# Patient Record
Sex: MALE | Race: WHITE | Employment: OTHER | URBAN - METROPOLITAN AREA
[De-identification: names, ages, dates, MRNs, and addresses within clinical notes are randomized per-mention and may not be internally consistent; named-entity substitution may affect disease eponyms.]

---

## 2019-08-05 ENCOUNTER — DOCUMENTATION ONLY (OUTPATIENT)
Dept: SURGERY | Facility: CLINIC | Age: 59
End: 2019-08-05

## 2019-08-05 NOTE — PROGRESS NOTES
ONCOLOGY INTAKE: Records Information      APPT INFORMATION:  Referring provider:  Dr. Howard Qureshi MD  Referring provider s clinic:  [facility]  Reason for visit/diagnosis:  Colon cancer metastasized to liver  Has patient been notified of appointment date and time?: no    RECORDS INFORMATION:  Were the records received with the referral (via [employer])? yes    Has patient been seen for any external appt for this diagnosis? yes    If yes, where? [facility]    Has patient had any imaging or procedures outside of [facility] for this condition? yes      If Yes, where? [facility]    ADDITIONAL INFORMATION:  Records faxed to Dr. Knowles team for review  Records saved in R drive  Will call patient once scheduling recommendations are advised.

## 2019-08-13 ENCOUNTER — PATIENT OUTREACH (OUTPATIENT)
Dept: ONCOLOGY | Facility: CLINIC | Age: 59
End: 2019-08-13

## 2019-08-13 NOTE — PROGRESS NOTES
Surgical Oncology RN Care Coordination Note:     Called and left a message with RNCC regarding a few questions regarding referral prior to scheduling per request of Dr. Knowles's review.     Need to confirm chemotherapy schedule and regimen. Will also need to obtain MRI prior to visit to further evaluate the liver lesions.     Lena Farfan, RN, BSN  Care Coordinator   [phone]

## 2019-08-15 ENCOUNTER — PATIENT OUTREACH (OUTPATIENT)
Dept: SURGERY | Facility: CLINIC | Age: 59
End: 2019-08-15

## 2019-08-15 DIAGNOSIS — C78.7 METASTATIC COLON CANCER TO LIVER (H): Primary | ICD-10-CM

## 2019-08-15 DIAGNOSIS — C18.9 METASTATIC COLON CANCER TO LIVER (H): Primary | ICD-10-CM

## 2019-08-15 NOTE — PROGRESS NOTES
Surgical Oncology/Hepatobiliary Surgery Referral      Referring provider: Dr. Fields - [facility] [place]     Referred to:  Surgical oncology - Hepatobiliary Surgeon      Referral Received: 8/5/2019     Evaluation for: Metastatic Colon cancer to the liver      Oncology provider (if applicable): Same as referring      Clinical History (per RN review of records provided):      Additional information available in Care everywhere.     - Patient s/p primary resection on 1/22/2019.     - Adjuvant chemotherapy included FOLFOX beginning 2/13/2019, panitumumab added 3/13/2019, changed to CAPEOX with panitumumab q3 weeks on 6/5/2019.     - Currently scheduled for cycle 11 day 1 on 8/30.     Imaging: Available in PACs, outside reads available in care everywhere.      Staging complete (if applicable): Yes.     08/15/2019 9:28 AM - Confirmed with Dr. Knowles patient should proceed with cycle 11 as scheduled and will plan for MRI of the liver to determine if liver mets are resectable. Will plan for patient to meet with Dr. Luong same day as MRI to discuss possible resection.     Referring office's RNCC updated regarding plan and to continue with chemotherapy as scheduled. She is aware we will proceed with getting MRI followed by office visit.    Orders placed and message sent to new patient scheduling to contact patient with scheduling plans.

## 2019-08-20 ENCOUNTER — PATIENT OUTREACH (OUTPATIENT)
Dept: CARE COORDINATION | Facility: CLINIC | Age: 59
End: 2019-08-20

## 2019-08-20 ENCOUNTER — PRE VISIT (OUTPATIENT)
Dept: ONCOLOGY | Facility: CLINIC | Age: 59
End: 2019-08-20

## 2019-08-20 ENCOUNTER — DOCUMENTATION ONLY (OUTPATIENT)
Dept: ONCOLOGY | Facility: CLINIC | Age: 59
End: 2019-08-20

## 2019-08-20 NOTE — PROGRESS NOTES
Per Patient's request,  completed and faxed [employer] request for lodging dates 9/12-9/13. [facility] [employer] will contact Patient for confirmation of reservation.  will continue to provide support as needed.    Soo Yeon Han, [facility] CenterSW  Pager:  [phone]

## 2019-08-20 NOTE — TELEPHONE ENCOUNTER
ONCOLOGY INTAKE: Records Information      APPT INFORMATION:   Referring provider:  Dr. Fields -    Referring provider s clinic:  [facility]    Reason for visit/diagnosis:  Metastatic Colon cancer to the liver      Has the patient been given a timeframe or date/time of appt?:No.      Is this appointment held on the schedule (Hold will be removed once appt is scheduled)?: Yes. Do not    remove hold please.     RECORDS INFORMATION:   Is there any additional testing/work up needed prior to visit?    Yes, MRI, needs to be done at our facility.      Has the patient been notified of diagnosis and appointment referral? Yes by the referring office.      Were the records sent directly to clinic? No.      Has patient been seen for any external appt for this diagnosis (enter clinic/location) that records should be    gathered from? No. We have them.      ADDITIONAL INFORMATION:    MRI scheduled for 9/12/19 at 530pm at the [facility] (check in at 5pm & NPO 6 hrs prior)    Referral to Dwain Pinto requested      Lena Farfan, RN  Lena Farfan, RN; P Onc Adult New Patient Scheduling-      This is a referral for surgical oncology and needs to be have an MRI prior to visit. MRI needs to be done here d/t the type of contrast etc.     Can be done the same day or the evening before and we can send referral to dwain pinto if he would like.

## 2019-08-21 NOTE — TELEPHONE ENCOUNTER
Lena Farfan, RN  Cristino Duff             No we have the imaging we need and patient is getting a MRI prior to visit.     We do not need the pathology for this patient either at this time.     He should be good to go.     Thanks!   KJ

## 2019-09-12 ENCOUNTER — ANCILLARY PROCEDURE (OUTPATIENT)
Dept: MRI IMAGING | Facility: CLINIC | Age: 59
End: 2019-09-12
Attending: SURGERY
Payer: COMMERCIAL

## 2019-09-12 DIAGNOSIS — C78.7 METASTATIC COLON CANCER TO LIVER (H): ICD-10-CM

## 2019-09-12 DIAGNOSIS — C18.9 METASTATIC COLON CANCER TO LIVER (H): ICD-10-CM

## 2019-09-12 NOTE — DISCHARGE INSTRUCTIONS
MRI Contrast Discharge Instructions    The IV contrast you received today will pass out of your body in your  urine. This will happen in the next 24 hours. You will not feel this process.  Your urine will not change color.    Drink at least 4 extra glasses of water or juice today (unless your doctor  has restricted your fluids). This reduces the stress on your kidneys.  You may take your regular medicines.    If you are on dialysis: It is best to have dialysis today.    If you have a reaction: Most reactions happen right away. If you have  any new symptoms after leaving the hospital (such as hives or swelling),  call your hospital at the correct number below. Or call your family doctor.  If you have breathing distress or wheezing, call 911.    Special instructions: ***    I have read and understand the above information.    Signature:______________________________________ Date:___________    Staff:__________________________________________ Date:___________     Time:__________    [facility] Departments:    ___[place]: [phone]  ___[facility]: [phone]  ___[place]: [phone] ___[facility]: [phone]  ___[facility]: [phone]  ___[facility]: [phone]  ___[phone]  ___[facility]: [phone]  ___[phone]

## 2019-09-13 ENCOUNTER — OFFICE VISIT (OUTPATIENT)
Dept: SURGERY | Facility: CLINIC | Age: 59
End: 2019-09-13
Attending: EMERGENCY MEDICINE
Payer: COMMERCIAL

## 2019-09-13 ENCOUNTER — PATIENT OUTREACH (OUTPATIENT)
Dept: SURGERY | Facility: CLINIC | Age: 59
End: 2019-09-13

## 2019-09-13 ENCOUNTER — HOSPITAL ENCOUNTER (INPATIENT)
Facility: CLINIC | Age: 59
Setting detail: SURGERY ADMIT
End: 2019-09-13
Attending: SURGERY | Admitting: SURGERY
Payer: COMMERCIAL

## 2019-09-13 VITALS
DIASTOLIC BLOOD PRESSURE: 89 MMHG | SYSTOLIC BLOOD PRESSURE: 135 MMHG | OXYGEN SATURATION: 98 % | HEART RATE: 73 BPM | WEIGHT: 228.6 LBS | HEIGHT: 73 IN | TEMPERATURE: 98.5 F | BODY MASS INDEX: 30.3 KG/M2 | RESPIRATION RATE: 16 BRPM

## 2019-09-13 DIAGNOSIS — C18.9 METASTATIC COLON CANCER TO LIVER (H): Primary | ICD-10-CM

## 2019-09-13 DIAGNOSIS — C78.7 METASTATIC COLON CANCER TO LIVER (H): Primary | ICD-10-CM

## 2019-09-13 PROCEDURE — G0463 HOSPITAL OUTPT CLINIC VISIT: HCPCS | Mod: ZF

## 2019-09-13 RX ORDER — CAPECITABINE 500 MG/1
TABLET, FILM COATED ORAL DAILY
COMMUNITY
Start: 2019-06-28 | End: 2019-11-08

## 2019-09-13 RX ORDER — LIDOCAINE/PRILOCAINE 2.5 %-2.5%
CREAM (GRAM) TOPICAL PRN
COMMUNITY
Start: 2019-02-07

## 2019-09-13 RX ORDER — SIMVASTATIN 20 MG
20 TABLET ORAL AT BEDTIME
COMMUNITY
Start: 2017-02-17 | End: 2020-02-28

## 2019-09-13 RX ORDER — MINOCYCLINE HYDROCHLORIDE 100 MG/1
100 CAPSULE ORAL DAILY
COMMUNITY
Start: 2019-04-24 | End: 2019-11-08

## 2019-09-13 RX ORDER — METOPROLOL SUCCINATE 100 MG/1
100 TABLET, EXTENDED RELEASE ORAL AT BEDTIME
Refills: 1 | Status: ON HOLD | COMMUNITY
Start: 2019-01-27 | End: 2020-02-11

## 2019-09-13 RX ORDER — DEXAMETHASONE 4 MG/1
8 TABLET ORAL DAILY
COMMUNITY
Start: 2019-03-13 | End: 2019-11-08

## 2019-09-13 ASSESSMENT — ENCOUNTER SYMPTOMS
DIZZINESS: 1
SEIZURES: 0
TASTE DISTURBANCE: 0
TINGLING: 1
WEAKNESS: 0
DYSURIA: 0
NUMBNESS: 0
NAIL CHANGES: 0
JAUNDICE: 0
SMELL DISTURBANCE: 0
BLOATING: 1
SORE THROAT: 0
ABDOMINAL PAIN: 0
HEMATURIA: 0
DIFFICULTY URINATING: 0
RECTAL PAIN: 0
SINUS CONGESTION: 0
VOMITING: 0
PARALYSIS: 0
BLOOD IN STOOL: 0
TROUBLE SWALLOWING: 0
SINUS PAIN: 0
BOWEL INCONTINENCE: 0
SKIN CHANGES: 0
DIARRHEA: 1
HEARTBURN: 0
SPEECH CHANGE: 0
BRUISES/BLEEDS EASILY: 1
FLANK PAIN: 0
CONSTIPATION: 1
HEADACHES: 0
TREMORS: 0
HOARSE VOICE: 0
NECK MASS: 0
NAUSEA: 0
LOSS OF CONSCIOUSNESS: 0
DISTURBANCES IN COORDINATION: 0
POOR WOUND HEALING: 1
SWOLLEN GLANDS: 0
MEMORY LOSS: 0

## 2019-09-13 ASSESSMENT — PAIN SCALES - GENERAL: PAINLEVEL: NO PAIN (0)

## 2019-09-13 ASSESSMENT — MIFFLIN-ST. JEOR: SCORE: 1910.8

## 2019-09-13 NOTE — NURSING NOTE
"Oncology Rooming Note    September 13, 2019 10:41 AM   Ishan Arroyo is a 59 year old male who presents for:    Chief Complaint   Patient presents with     New Patient     NEW PT; COLON CANCER TO LIVER; VITALS TAKEN      Initial Vitals: /89   Pulse 73   Temp 98.5  F (36.9  C) (Oral)   Resp 16   Ht 1.854 m (6' 1\")   Wt 103.7 kg (228 lb 9.6 oz)   SpO2 98%   BMI 30.16 kg/m   Estimated body mass index is 30.16 kg/m  as calculated from the following:    Height as of this encounter: 1.854 m (6' 1\").    Weight as of this encounter: 103.7 kg (228 lb 9.6 oz). Body surface area is 2.31 meters squared.  No Pain (0) Comment: Data Unavailable   No LMP for male patient.  Allergies reviewed: Yes  Medications reviewed: Yes    Medications: Medication refills not needed today.  Pharmacy name entered into EPIC: [facility] - [place], [place] - [address] [place] [address]    Clinical concerns: No new concerns today  Dr Luong was notified.      Emelyn Valverde              "

## 2019-09-13 NOTE — LETTER
9/13/2019       RE: Ishan Arroyo  [address]  [place] [address]     Dear Colleague,    Thank you for referring your patient, Ishan Arroyo, to the [facility]. Please see a copy of my visit note below.    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  Two lesions, 1 in the right hepatic dome, segment 8 abutting the right hepatic vein and encroaching close to the vena cava. The second in deep in segment 7.  Liver cysts stable.  Last dose of CAPEOX-antiEGFR was 8/30/19.  The patient now presents for recommendations on management.  He is healthy and active with good performance status.  Non-smoker.  Minimal alcohol.  No history of hepatitis or cirrhosis.  He takes a beta blocker for HTN and a statin for HLD.     Pertinent Work-Up/Findings: Recent liver MRI shows a lesion in the dome, segment 8 abutting the right hepatic vein and lying close to the vena cava.  A second lesion is deep in segment 7.  No other evidence of metastatic disease by my read, but official radiology read is pending.     Pertinent Exam: Abdomen soft, non-tender, and non-distended.  No jaundice or scleral icterus.  Well healed lower midline laparotomy.     Assessment/Counseling/Plan: 59 M w/ colorectal cancer liver metastases in segments 8 and 7 with good response to 11 cycles of systemic therapy with CAPEOX-antiEGFR.  His disease appears to be resectable with formal right hepatectomy given the abutment of the right hepatic vein.  Given his duration of chemotherapy, his FLR should be 30% for acceptable outcomes following major hepatectomy.  I discussed the risks, benefits, and alternatives of the surgery.  Risk including, but not limited to death, DVT, PE, PNA, cardiac complications, UTI, injury to adjacent organs,  infection, bleeding, bile leak, and hepatic failure were discussed.   Given his chemotherapy, need for major hepatectomy for the best oncologic outcomes, and need for an FLR of 30%, I would like to obtain a CT liver protocol with volumetrics to assess for resection candidacy and potential need for PVE.  If unable to have surgery, then other liver directed therapies such as ablation or embolization may be options.  A tentative surgery date will be posted to the schedule 4-6 weeks following his last dose of chemotherapy.  Consent was signed in the office today.  Of course this date is contingent upon his liver volumetry.  All questions were answered and the patient was in agreement with the above plan.     A total of 45 minutes of face to face time was spent on this case, of which, more than half was spent in counseling and coordination of care.    Again, thank you for allowing me to participate in the care of your patient.      Sincerely,    Lele Luong MD

## 2019-09-13 NOTE — PATIENT INSTRUCTIONS
Surgical Oncology RN Care Coordination Note:     CT scan of the liver.     Surgery on 10/3/2019.     Lena Farfan RN, BSN  Care Coordinator   [phone]

## 2019-09-13 NOTE — PROGRESS NOTES
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  Two lesions, 1 in the right hepatic dome, segment 8 abutting the right hepatic vein and encroaching close to the vena cava. The second in deep in segment 7.  Liver cysts stable.  Last dose of CAPEOX-antiEGFR was 8/30/19.  The patient now presents for recommendations on management.  He is healthy and active with good performance status.  Non-smoker.  Minimal alcohol.  No history of hepatitis or cirrhosis.  He takes a beta blocker for HTN and a statin for HLD.     Pertinent Work-Up/Findings: Recent liver MRI shows a lesion in the dome, segment 8 abutting the right hepatic vein and lying close to the vena cava.  A second lesion is deep in segment 7.  No other evidence of metastatic disease by my read, but official radiology read is pending.     Pertinent Exam: Abdomen soft, non-tender, and non-distended.  No jaundice or scleral icterus.  Well healed lower midline laparotomy.     Assessment/Counseling/Plan: 59 M w/ colorectal cancer liver metastases in segments 8 and 7 with good response to 11 cycles of systemic therapy with CAPEOX-antiEGFR.  His disease appears to be resectable with formal right hepatectomy given the abutment of the right hepatic vein.  Given his duration of chemotherapy, his FLR should be 30% for acceptable outcomes following major hepatectomy.  I discussed the risks, benefits, and alternatives of the surgery.  Risk including, but not limited to death, DVT, PE, PNA, cardiac complications, UTI, injury to adjacent organs, infection, bleeding, bile leak, and hepatic failure were discussed.  Given his chemotherapy, need for major hepatectomy for the best oncologic outcomes, and need for an FLR of 30%, I would like to obtain a CT liver protocol with volumetrics to  assess for resection candidacy and potential need for PVE.  If unable to have surgery, then other liver directed therapies such as ablation or embolization may be options.  A tentative surgery date will be posted to the schedule 4-6 weeks following his last dose of chemotherapy.  Consent was signed in the office today.  Of course this date is contingent upon his liver volumetry.  All questions were answered and the patient was in agreement with the above plan.     A total of 45 minutes of face to face time was spent on this case, of which, more than half was spent in counseling and coordination of care.

## 2019-09-13 NOTE — PROGRESS NOTES
Surgical Oncology RN Care Coordination Note:     Called and spoke with ROSEMARY Reeder at the oncology clinic and updated her that we are planning on proceeding with surgery on 10/3 and would request patient doesn't get any further chemotherapy at this time. Informed her the patient is aware of this as well already.     Lena Farfan RN, BSN  Care Coordinator   [phone]

## 2019-09-20 ENCOUNTER — ANCILLARY PROCEDURE (OUTPATIENT)
Dept: CT IMAGING | Facility: CLINIC | Age: 59
End: 2019-09-20
Attending: SURGERY
Payer: COMMERCIAL

## 2019-09-20 DIAGNOSIS — C78.7 METASTATIC COLON CANCER TO LIVER (H): ICD-10-CM

## 2019-09-20 DIAGNOSIS — C18.9 METASTATIC COLON CANCER TO LIVER (H): ICD-10-CM

## 2019-09-20 RX ORDER — IOPAMIDOL 755 MG/ML
135 INJECTION, SOLUTION INTRAVASCULAR ONCE
Status: COMPLETED | OUTPATIENT
Start: 2019-09-20 | End: 2019-09-20

## 2019-09-20 RX ADMIN — IOPAMIDOL 135 ML: 755 INJECTION, SOLUTION INTRAVASCULAR at 14:20

## 2019-09-24 ENCOUNTER — TELEPHONE (OUTPATIENT)
Dept: SURGERY | Facility: CLINIC | Age: 59
End: 2019-09-24

## 2019-09-24 NOTE — TELEPHONE ENCOUNTER
Patient is scheduled for surgery with Dr. Sterling Luong      Spoke with: Alin    Date of Surgery: 10/03/2019    Location: [address] [address]  3rd floor- 3C    Informed patient they will need an adult  Yes    Pre-op with surgeon (if applicable): Completed     H&P: Scheduled with Goran Lopez    Additional imaging/appointments: Completed    Surgery packet: Sent via [employer].     Additional comments: Patient confirmed details above with this writer.

## 2019-09-25 ENCOUNTER — TELEPHONE (OUTPATIENT)
Dept: CARE COORDINATION | Facility: CLINIC | Age: 59
End: 2019-09-25

## 2019-09-25 NOTE — TELEPHONE ENCOUNTER
Per Patient's request,  completed and faxed [employer] [place] request for lodging dates 10/2/2019 - 10/3/2019. [employer] [place] will contact Patient for confirmation of reservation.  will continue to provide support as needed.    Esperanza Cifuentes [facility]  Outpatient Specialty Clinics  Direct Phone: [phone]  Pager:  [phone]

## 2019-09-27 ENCOUNTER — TELEPHONE (OUTPATIENT)
Dept: SURGERY | Facility: CLINIC | Age: 59
End: 2019-09-27

## 2019-09-27 NOTE — TELEPHONE ENCOUNTER
As I was unable to reach the patient on his home phone, I called his daughter to discuss the next steps of care.  I told her that I still planned for liver resection in the near future; however, his liver volumes returned showing marginal future liver remnant following removal of the right liver.  As such, I have recommended that the patient undergo right portal vein embolization with 4 week follow-up imaging to reassess size prior to proceeding with surgery.  The daughter was in agreement and will relay the message to her father.  We will work on setting the patient up with IR for embolization next week.

## 2019-09-30 DIAGNOSIS — C19 METASTATIC COLORECTAL CANCER: Primary | ICD-10-CM

## 2019-10-03 ENCOUNTER — PATIENT OUTREACH (OUTPATIENT)
Dept: SURGERY | Facility: CLINIC | Age: 59
End: 2019-10-03

## 2019-10-03 DIAGNOSIS — C78.7 METASTATIC COLON CANCER TO LIVER (H): Primary | ICD-10-CM

## 2019-10-03 DIAGNOSIS — C18.9 METASTATIC COLON CANCER TO LIVER (H): Primary | ICD-10-CM

## 2019-10-03 NOTE — PROGRESS NOTES
Surgical Oncology RN Care Coordination Note:     Called and spoke with patients daughter and informed her of plan following embolization. Informed her that we would plan to see the patient back 1 month post procedure with new CT scan and visit with Dr. Luong and our PAC clinic for surgery clearance. She agreed with plan and will discuss with her father.     Note sent to scheduling team to contact patient with appointment info.     Lena Farfan RN, BSN  Care Coordinator   [phone]

## 2019-10-03 NOTE — TELEPHONE ENCOUNTER
RECORDS RECEIVED FROM: PV embolizatoin    DATE RECEIVED: 10.8.19   NOTES STATUS DETAILS   OFFICE NOTE from referring provider Internal 9.13.19 Dr. Lele Luong   OFFICE NOTE from other specialist Internal/CE    OPERATIVE REPORT N/A    MEDICATION LIST Internal/CE    PERTINENT LABS Care Everywhere    CTA (CT ANGIOGRAPHY) N/A    CT In Pacs    MRI In Pacs    ULTRASOUND N/A

## 2019-10-04 RX ORDER — HEPARIN SOD,PORCINE/0.9 % NACL 5K/1000 ML
1000-10000 INTRAVENOUS SOLUTION INTRAVENOUS
Status: CANCELLED | OUTPATIENT
Start: 2019-10-04

## 2019-10-07 ENCOUNTER — TELEPHONE (OUTPATIENT)
Dept: SURGERY | Facility: CLINIC | Age: 59
End: 2019-10-07

## 2019-10-07 DIAGNOSIS — C19 METASTATIC COLORECTAL CANCER: Primary | ICD-10-CM

## 2019-10-07 ASSESSMENT — ENCOUNTER SYMPTOMS
VOMITING: 0
BLOATING: 0
HEMATURIA: 0
TREMORS: 0
NAUSEA: 0
BLOOD IN STOOL: 0
NUMBNESS: 1
DIZZINESS: 0
BOWEL INCONTINENCE: 0
SEIZURES: 0
SKIN CHANGES: 0
POOR WOUND HEALING: 0
RECTAL PAIN: 0
HEADACHES: 0
DIARRHEA: 1
LOSS OF CONSCIOUSNESS: 0
SPEECH CHANGE: 0
PARALYSIS: 0
HEARTBURN: 0
FLANK PAIN: 0
CONSTIPATION: 1
DIFFICULTY URINATING: 0
DISTURBANCES IN COORDINATION: 0
TINGLING: 1
SWOLLEN GLANDS: 0
DYSURIA: 0
JAUNDICE: 0
ABDOMINAL PAIN: 0
BRUISES/BLEEDS EASILY: 1
NAIL CHANGES: 0
MEMORY LOSS: 0
WEAKNESS: 0

## 2019-10-07 NOTE — TELEPHONE ENCOUNTER
----- Message from Lena Farfan RN sent at 10/3/2019  3:04 PM CDT -----  Regarding: pre surgery appointments ; )  Terrence Jain - because we had to delay his surgery a little bit we need to see him back with a few things.     We are going to see him back on 11/8/2019 at 1030 am, he will need to have CT scan prior to office visit.     Also needs to be scheduled to see PAC after the consult with Sterling.     Orders are placed for all and they are aware you will be calling to confirm info.     His daughter Ashley manages his appointment and they use [employer] as well. :)     Thanks! KJ

## 2019-10-07 NOTE — TELEPHONE ENCOUNTER
Patient is scheduled for surgery with Dr. Lele Holly      Spoke with: Alin    Date of Surgery: 11/12/2019    Location: [address] [address]  3rd floor-     Informed patient they will need an adult  YES    Pre-op with surgeon (if applicable): 11/08/2019 with Dr. Luong    H&P: Scheduled with PAC on 11/08/2019    Additional imaging/appointments: CT scan on 11/08/2019    Surgery packet: Mail per patient.     Additional comments: Patient expressed understanding and confirmed details above with this writer.

## 2019-10-08 ENCOUNTER — PRE VISIT (OUTPATIENT)
Dept: VASCULAR SURGERY | Facility: CLINIC | Age: 59
End: 2019-10-08

## 2019-10-08 ENCOUNTER — OFFICE VISIT (OUTPATIENT)
Dept: VASCULAR SURGERY | Facility: CLINIC | Age: 59
End: 2019-10-08
Payer: COMMERCIAL

## 2019-10-08 ENCOUNTER — PRE VISIT (OUTPATIENT)
Dept: SURGERY | Facility: CLINIC | Age: 59
End: 2019-10-08

## 2019-10-08 VITALS — SYSTOLIC BLOOD PRESSURE: 132 MMHG | OXYGEN SATURATION: 96 % | HEART RATE: 69 BPM | DIASTOLIC BLOOD PRESSURE: 83 MMHG

## 2019-10-08 DIAGNOSIS — C78.7 SECONDARY MALIGNANT NEOPLASM OF LIVER (H): Primary | ICD-10-CM

## 2019-10-08 DIAGNOSIS — C19 METASTATIC COLORECTAL CANCER: ICD-10-CM

## 2019-10-08 LAB
ALBUMIN SERPL-MCNC: 3.7 G/DL (ref 3.4–5)
ALP SERPL-CCNC: 108 U/L (ref 40–150)
ALT SERPL W P-5'-P-CCNC: 27 U/L (ref 0–70)
ANION GAP SERPL CALCULATED.3IONS-SCNC: 4 MMOL/L (ref 3–14)
AST SERPL W P-5'-P-CCNC: 31 U/L (ref 0–45)
BILIRUB DIRECT SERPL-MCNC: 0.2 MG/DL (ref 0–0.2)
BILIRUB SERPL-MCNC: 0.7 MG/DL (ref 0.2–1.3)
BUN SERPL-MCNC: 10 MG/DL (ref 7–30)
CALCIUM SERPL-MCNC: 9.1 MG/DL (ref 8.5–10.1)
CHLORIDE SERPL-SCNC: 107 MMOL/L (ref 94–109)
CO2 SERPL-SCNC: 27 MMOL/L (ref 20–32)
CREAT SERPL-MCNC: 0.59 MG/DL (ref 0.66–1.25)
ERYTHROCYTE [DISTWIDTH] IN BLOOD BY AUTOMATED COUNT: 18.5 % (ref 10–15)
GFR SERPL CREATININE-BSD FRML MDRD: >90 ML/MIN/{1.73_M2}
GLUCOSE SERPL-MCNC: 91 MG/DL (ref 70–99)
HCT VFR BLD AUTO: 42.1 % (ref 40–53)
HGB BLD-MCNC: 14.3 G/DL (ref 13.3–17.7)
INR PPP: 0.99 (ref 0.86–1.14)
MCH RBC QN AUTO: 31.6 PG (ref 26.5–33)
MCHC RBC AUTO-ENTMCNC: 34 G/DL (ref 31.5–36.5)
MCV RBC AUTO: 93 FL (ref 78–100)
PLATELET # BLD AUTO: 201 10E9/L (ref 150–450)
POTASSIUM SERPL-SCNC: 4.1 MMOL/L (ref 3.4–5.3)
PROT SERPL-MCNC: 7.6 G/DL (ref 6.8–8.8)
RBC # BLD AUTO: 4.52 10E12/L (ref 4.4–5.9)
SODIUM SERPL-SCNC: 139 MMOL/L (ref 133–144)
WBC # BLD AUTO: 6.3 10E9/L (ref 4–11)

## 2019-10-08 ASSESSMENT — PAIN SCALES - GENERAL: PAINLEVEL: NO PAIN (0)

## 2019-10-08 NOTE — LETTER
10/8/2019       RE: Ishan Arroyo  [address] [facility] [address] [facility] [address]     Dear Colleague,    Thank you for referring your patient, Ishan Arroyo, to the [facility] at [facility]. Please see a copy of my visit note below.      INTERVENTIONAL RADIOLOGY CONSULTATION    Name: Ishan Arroyo  Age: 59 year old   Referring Physician: Dr. Luong   REASON FOR REFERRAL: Portal vein embolization     HPI: Patient is being referred for portal vein embolization.  Patient with a history of rectosigmoid cancer, status post resection with synchronous liver metastasis now status post  chemotherapy.  There are 2 lesions in the liver, one in the hepatic dome segment 8 of abutting the right hepatic vein and encroaching close to the vena cava and the second one deep in the segment 7.  Patient does not have any previous history of liver disease.  He is being considered for surgical resection (right  hepatectomy), however his future liver remnant (FLR) needs to be at least 30% for acceptable outcomes following right hepatectomy (in consideration with him having chemotherapy in the past, with no evidence of prior liver disease).  His liver volumes have returned marginal, and right portal vein embolization is being considered.    PAST MEDICAL HISTORY:   No past medical history on file.    PAST SURGICAL HISTORY:   No past surgical history on file.    FAMILY HISTORY:   No family history on file.    SOCIAL HISTORY:   Social History     Tobacco Use     Smoking status: Never Smoker     Smokeless tobacco: Current User     Types: Chew   Substance Use Topics     Alcohol use: Not on file       PROBLEM LIST:   There are no active problems to display for this patient.      MEDICATIONS:   Prescription Medications as of 10/8/2019       Rx Number Disp Refills Start End Last Dispensed Date Next Fill Date Owning Pharmacy    capecitabine (XELODA) 500 MG tablet CHEMO    6/28/2019    [facility] -  [address]    Sig: daily    Class: Historical    dexamethasone (DECADRON) 4 MG tablet    3/13/2019    [address]    Sig: Take 8 mg by mouth daily    Class: Historical    Route: Oral    lidocaine-prilocaine (EMLA) 2.5-2.5 % external cream    2/7/2019    [address]    Sig: Apply topically as needed    Class: Historical    Route: Topical    metoprolol succinate ER (TOPROL-XL) 100 MG 24 hr tablet   1 1/27/2019    [address]    Sig: Take 100 mg by mouth daily    Class: Historical    Route: Oral    minocycline (MINOCIN/DYNACIN) 100 MG capsule    4/24/2019    [address]    Sig: Take 100 mg by mouth daily    Class: Historical    Route: Oral    Multiple Vitamins-Minerals (MULTI VITAMIN  /MINERALS) TABS        [address]    Sig: Take 1 tablet by mouth daily    Class: Historical    Route: Oral    simvastatin (ZOCOR) 20 MG tablet    2/17/2017    [address]    Sig: Take 20 mg by mouth daily    Class: Historical    Route: Oral          ALLERGIES:   Patient has no known allergies.    ROS:      Answers for HPI/ROS submitted by the patient on 10/7/2019   General Symptoms: No  Skin Symptoms: Yes  HENT Symptoms: No  EYE SYMPTOMS: No  HEART SYMPTOMS: No  LUNG SYMPTOMS: No  INTESTINAL SYMPTOMS: Yes  URINARY SYMPTOMS: Yes  REPRODUCTIVE SYMPTOMS: No  SKELETAL SYMPTOMS: No  BLOOD SYMPTOMS: Yes  NERVOUS SYSTEM SYMPTOMS: Yes  MENTAL HEALTH SYMPTOMS: No  Changes in hair: No  Changes in moles/birth marks: No  Itching: No  Rashes: No  Changes in nails: No  Acne: No  Change in facial hair: No  Warts: No  Non-healing sores: No  Scarring: No  Flaking of skin: No  Color changes of hands/feet in cold : No  Sun sensitivity: No  Skin thickening: No  Heart burn or indigestion: No  Nausea: No  Vomiting:  No  Abdominal pain: No  Bloating: No  Constipation: Yes  Diarrhea: Yes  Blood in stool: No  Black stools: No  Rectal or Anal pain: No  Fecal incontinence: No  Yellowing of skin or eyes: No  Vomit with blood: No  Change in stools: No  Trouble holding urine or incontinence: No  Pain or burning: No  Trouble starting or stopping: No  Increased frequency of urination: No  Blood in urine: No  Decreased frequency of urination: No  Frequent nighttime urination: Yes  Flank pain: No  Difficulty emptying bladder: No  Anemia: No  Swollen glands: No  Easy bleeding or bruising: Yes  Edema or swelling: No  Trouble with coordination: No  Dizziness or trouble with balance: No  Fainting or black-out spells: No  Memory loss: No  Headache: No  Seizures: No  Speech problems: No  Tingling: Yes  Tremor: No  Weakness: No  Difficulty walking: No  Paralysis: No  Numbness: Yes      Physical Examination:   VITALS:   There were no vitals taken for this visit.  Constitutional: healthy, alert and no distress  Head: Normocephalic.  Respiratory: Breathing comfortably on room air   Neurologic: Gait normal.   Psychiatric: affect normal/bright and mentation appears normal.    Labs:    BMP RESULTS:  No results found for: NA, POTASSIUM, CHLORIDE, CO2, ANIONGAP, GLC, BUN, CR, GFRESTIMATED, GFRESTBLACK, IRENE     CBC RESULTS:  No results found for: WBC, RBC, HGB, HCT, MCV, MCH, MCHC, RDW, PLT    INR/PTT:  No results found for: INR, PTT    Diagnostic studies: CT 9/20/2019: IMPRESSION:  1. Redemonstrated ill-defined hypoenhancing areas at the medial dome  of the liver and superior posterior right lobe of the liver, without  discrete mass or enhancing lesion identified consistent with areas of  treated liver metastasis. Clinical correlation.  2. Hepatic cysts redemonstrated.  3. Postop changes and increased density in the presacral and left  perirectal fat on series 2 image 114. This is nonspecific but stable  since PET CT of 7/29/2019 and would be consistent  with postoperative  change.  4. No convincing evidence for metastatic disease.    Assessment : 59-year-old male with colorectal cancer, status post surgical resection, with synchronous liver metastasis status post chemotherapy.  Patient is being considered for right hepatectomy in view of 2 right lobe liver lesions and considering his excellent functional status.  However future liver remnant is marginal, considering the minimal functional liver remnant for him to be 30%.  Hence right portal vein embolization is being considered, patient is a good candidate for the same. We plan to embolized the segments 5,6,7,8 sparing the 2,3 and 4.     We explained the procedure to the patient including risks and complications. All his questions were answered. We explained the procedure will be performed under general anesthesia and he is likely to be discharged the same day.        Plan: Patient is scheduled for right portal vein embolization of segments 5-8, Dr. Luong would prefer an ipsilateral approach if possible. No need to embolize segment 4, surgeons are planning on keeping that segment .    Edmundo Bynum   IR fellow  [phone]      I have seen this patient with Dr. Bynum and agree with the note.      CC  Patient Care Team:  Goran Lopez MD as PCP - General (Pulmonary)  Lele Luong MD as Surgeon        Again, thank you for allowing me to participate in the care of your patient.      Sincerely,    Daniel Machado MD

## 2019-10-08 NOTE — PATIENT INSTRUCTIONS
Preventive Care:    Colorectal Cancer Screening: During our visit today, we discussed that it is recommended you receive colorectal cancer screening. Please call or make an appointment with your primary care provider to discuss this. You may also call the [facility] scheduling line ([phone]) to set up a colonoscopy appointment.          Date: Thursday 10/10/2019  -Please check: 10am  -Location: 3rd floor, [address]  is located at [facility], located at [address] [place] [address].     Reminders:     -Nothing to eat or drink after midnight     -Please take your morning medications as indicated with a sip of water.    -You will also be going home; so make sure that you have a  to bring you home.     *please follow up with Dr. Luong regarding follow up for your scheduled procedure.      *Please keep in mind that you may have Post Embolization syndrome.   These symptoms can last for up to 14 days.     This includes:    -high fevers 101-102, which may last for a couple of days. We recommend using over the counter medications such as Tylenol or Ibuprofen.     -Nausea, in which we will give you medications after you are discharged home.     -Decreased appetite: We don't expect you to eat 3 full meals. Instead, we prefer that you  snack through out the day.     -Feeling fatigue: this is normal, however we recommend that you get up and walk around.     **Please keep in mind to stay hydrated after the procedure. At  Least 6 glasses of water a day.      *Abnormal symptoms in which to call or seek immediate help:  1. Severe abdominal pain that doesn't go away with pain medications.   2. High fevers that do not come down with over the counter medications.   3. Any swelling of the abdomen or legs.    Should ANY of the above symptoms are exhibited, please seek immediate help or call our hospital at [phone] and ask for the Interventional Radiologist On-call (after hours) or you can  contact me (during business hours) 8-4pm.      Please review your schedule and should you have any further questions, you may call me at my direct line.     *Your surgical team has been communicated of your treatment date with Dr. Machado.       Sincerely,     Carlotta WILDER RN, BSN  Interventional Radiology Nurse Coordinator   Phone: [phone]

## 2019-10-08 NOTE — NURSING NOTE
Vascular Rooming Note     Ishan Arroyo's goals for this visit include:   Chief Complaint   Patient presents with     Consult     Alin, is being seen today for a consult regarding PV embolization, feeling very good, no concerns at this time, as reported by patient.     Hafsa Doe LPN

## 2019-10-08 NOTE — TELEPHONE ENCOUNTER
FUTURE VISIT INFORMATION      SURGERY INFORMATION:    Date: 10/10/19, 19    Location: [facility] OR    Surgeon:  Lele Brito Varvara Kirchner    Anesthesia Type:  Combined General with Block    RECORDS REQUESTED FROM:       Primary Care Provider: Goran Lopez MD- [facility]- requested recs/testing    Most recent EKG+ Tracin19-[facility]- requested tracing

## 2019-10-10 ENCOUNTER — HOSPITAL ENCOUNTER (OUTPATIENT)
Facility: CLINIC | Age: 59
Discharge: HOME | End: 2019-10-10
Attending: RADIOLOGY | Admitting: RADIOLOGY
Payer: COMMERCIAL

## 2019-10-10 ENCOUNTER — APPOINTMENT (OUTPATIENT)
Dept: INTERVENTIONAL RADIOLOGY/VASCULAR | Facility: CLINIC | Age: 59
End: 2019-10-10
Attending: RADIOLOGY
Payer: COMMERCIAL

## 2019-10-10 ENCOUNTER — ANESTHESIA (OUTPATIENT)
Dept: SURGERY | Facility: CLINIC | Age: 59
End: 2019-10-10
Payer: COMMERCIAL

## 2019-10-10 ENCOUNTER — ANESTHESIA EVENT (OUTPATIENT)
Dept: SURGERY | Facility: CLINIC | Age: 59
End: 2019-10-10
Payer: COMMERCIAL

## 2019-10-10 VITALS
RESPIRATION RATE: 18 BRPM | OXYGEN SATURATION: 92 % | TEMPERATURE: 98.8 F | HEIGHT: 74 IN | DIASTOLIC BLOOD PRESSURE: 65 MMHG | SYSTOLIC BLOOD PRESSURE: 101 MMHG | WEIGHT: 230.38 LBS | BODY MASS INDEX: 29.57 KG/M2 | HEART RATE: 66 BPM

## 2019-10-10 DIAGNOSIS — C19 METASTATIC COLORECTAL CANCER: ICD-10-CM

## 2019-10-10 LAB
ABO + RH BLD: NORMAL
ABO + RH BLD: NORMAL
ALBUMIN SERPL-MCNC: 3.6 G/DL (ref 3.4–5)
ALP SERPL-CCNC: 91 U/L (ref 40–150)
ALT SERPL W P-5'-P-CCNC: 26 U/L (ref 0–70)
ANION GAP SERPL CALCULATED.3IONS-SCNC: 5 MMOL/L (ref 3–14)
APTT PPP: 35 SEC (ref 22–37)
AST SERPL W P-5'-P-CCNC: 28 U/L (ref 0–45)
BILIRUB DIRECT SERPL-MCNC: 0.2 MG/DL (ref 0–0.2)
BILIRUB SERPL-MCNC: 0.8 MG/DL (ref 0.2–1.3)
BLD GP AB SCN SERPL QL: NORMAL
BLOOD BANK CMNT PATIENT-IMP: NORMAL
BUN SERPL-MCNC: 13 MG/DL (ref 7–30)
CALCIUM SERPL-MCNC: 8.6 MG/DL (ref 8.5–10.1)
CHLORIDE SERPL-SCNC: 106 MMOL/L (ref 94–109)
CO2 SERPL-SCNC: 27 MMOL/L (ref 20–32)
CREAT SERPL-MCNC: 0.59 MG/DL (ref 0.66–1.25)
ERYTHROCYTE [DISTWIDTH] IN BLOOD BY AUTOMATED COUNT: 18.5 % (ref 10–15)
GFR SERPL CREATININE-BSD FRML MDRD: >90 ML/MIN/{1.73_M2}
GLUCOSE BLDC GLUCOMTR-MCNC: 83 MG/DL (ref 70–99)
GLUCOSE SERPL-MCNC: 84 MG/DL (ref 70–99)
HCT VFR BLD AUTO: 42 % (ref 40–53)
HGB BLD-MCNC: 13.6 G/DL (ref 13.3–17.7)
INR PPP: 1.14 (ref 0.86–1.14)
MCH RBC QN AUTO: 30.6 PG (ref 26.5–33)
MCHC RBC AUTO-ENTMCNC: 32.4 G/DL (ref 31.5–36.5)
MCV RBC AUTO: 94 FL (ref 78–100)
PLATELET # BLD AUTO: 212 10E9/L (ref 150–450)
POTASSIUM SERPL-SCNC: 3.8 MMOL/L (ref 3.4–5.3)
PROT SERPL-MCNC: 7.3 G/DL (ref 6.8–8.8)
RBC # BLD AUTO: 4.45 10E12/L (ref 4.4–5.9)
SODIUM SERPL-SCNC: 138 MMOL/L (ref 133–144)
SPECIMEN EXP DATE BLD: NORMAL
WBC # BLD AUTO: 6.3 10E9/L (ref 4–11)

## 2019-10-10 PROCEDURE — 25000128 H RX IP 250 OP 636: Performed by: RADIOLOGY

## 2019-10-10 PROCEDURE — 27810385

## 2019-10-10 PROCEDURE — 85730 THROMBOPLASTIN TIME PARTIAL: CPT | Performed by: RADIOLOGY

## 2019-10-10 PROCEDURE — 37243 VASC EMBOLIZE/OCCLUDE ORGAN: CPT

## 2019-10-10 PROCEDURE — 27210888 ZZH ACCESSORY CR7

## 2019-10-10 PROCEDURE — C1887 CATHETER, GUIDING: HCPCS

## 2019-10-10 PROCEDURE — 80048 BASIC METABOLIC PNL TOTAL CA: CPT | Performed by: RADIOLOGY

## 2019-10-10 PROCEDURE — 71000014 ZZH RECOVERY PHASE 1 LEVEL 2 FIRST HR

## 2019-10-10 PROCEDURE — 37000009 ZZH ANESTHESIA TECHNICAL FEE, EACH ADDTL 15 MIN

## 2019-10-10 PROCEDURE — 27810159 ZZH COIL/EMBOLIC DEVICE CR8

## 2019-10-10 PROCEDURE — 27210886 ZZH ACCESSORY CR5

## 2019-10-10 PROCEDURE — 36415 COLL VENOUS BLD VENIPUNCTURE: CPT | Performed by: RADIOLOGY

## 2019-10-10 PROCEDURE — 40000170 ZZH STATISTIC PRE-PROCEDURE ASSESSMENT II

## 2019-10-10 PROCEDURE — 37000008 ZZH ANESTHESIA TECHNICAL FEE, 1ST 30 MIN

## 2019-10-10 PROCEDURE — C1769 GUIDE WIRE: HCPCS

## 2019-10-10 PROCEDURE — 27210889 ZZH ACCESSORY CR8

## 2019-10-10 PROCEDURE — 27810153 ZZH COIL/EMBOLIC DEVICE CR22

## 2019-10-10 PROCEDURE — 27210912 ZZH NEEDLE CR8

## 2019-10-10 PROCEDURE — 75887 VEIN X-RAY LIVER W/O HEMODYN: CPT

## 2019-10-10 PROCEDURE — 86901 BLOOD TYPING SEROLOGIC RH(D): CPT | Performed by: RADIOLOGY

## 2019-10-10 PROCEDURE — 25800030 ZZH RX IP 258 OP 636: Performed by: NURSE ANESTHETIST, CERTIFIED REGISTERED

## 2019-10-10 PROCEDURE — 27810275 ZZH COIL/EMBOLIC DEVICE CR9

## 2019-10-10 PROCEDURE — 25500064 ZZH RX 255 OP 636: Performed by: RADIOLOGY

## 2019-10-10 PROCEDURE — 71000027 ZZH RECOVERY PHASE 2 EACH 15 MINS

## 2019-10-10 PROCEDURE — 85027 COMPLETE CBC AUTOMATED: CPT | Performed by: RADIOLOGY

## 2019-10-10 PROCEDURE — 36011 PLACE CATHETER IN VEIN: CPT

## 2019-10-10 PROCEDURE — 27810149 ZZH COIL/EMBOLIC DEVICE CR12

## 2019-10-10 PROCEDURE — 25000565 ZZH ISOFLURANE, EA 15 MIN

## 2019-10-10 PROCEDURE — 27210995 ZZH RX 272: Performed by: RADIOLOGY

## 2019-10-10 PROCEDURE — 27810166 ZZH PLUG CR19

## 2019-10-10 PROCEDURE — 25800030 ZZH RX IP 258 OP 636: Performed by: RADIOLOGY

## 2019-10-10 PROCEDURE — 86850 RBC ANTIBODY SCREEN: CPT | Performed by: RADIOLOGY

## 2019-10-10 PROCEDURE — 27210735 ZZH ACCESSORY CR12

## 2019-10-10 PROCEDURE — 27210732 ZZH ACCESSORY CR1

## 2019-10-10 PROCEDURE — 85610 PROTHROMBIN TIME: CPT | Performed by: RADIOLOGY

## 2019-10-10 PROCEDURE — 27210804 ZZH SHEATH CR3

## 2019-10-10 PROCEDURE — 25000125 ZZHC RX 250: Performed by: RADIOLOGY

## 2019-10-10 PROCEDURE — 25000128 H RX IP 250 OP 636: Performed by: NURSE ANESTHETIST, CERTIFIED REGISTERED

## 2019-10-10 PROCEDURE — 27810155 ZZH COIL/EMBOLIC DEVICE CR24

## 2019-10-10 PROCEDURE — 82962 GLUCOSE BLOOD TEST: CPT

## 2019-10-10 PROCEDURE — 25000125 ZZHC RX 250: Performed by: NURSE ANESTHETIST, CERTIFIED REGISTERED

## 2019-10-10 PROCEDURE — 80076 HEPATIC FUNCTION PANEL: CPT | Performed by: RADIOLOGY

## 2019-10-10 PROCEDURE — 86900 BLOOD TYPING SEROLOGIC ABO: CPT | Performed by: RADIOLOGY

## 2019-10-10 RX ORDER — HYDROMORPHONE HYDROCHLORIDE 1 MG/ML
.3-.5 INJECTION, SOLUTION INTRAMUSCULAR; INTRAVENOUS; SUBCUTANEOUS EVERY 10 MIN PRN
Status: DISCONTINUED | OUTPATIENT
Start: 2019-10-10 | End: 2019-10-10 | Stop reason: HOSPADM

## 2019-10-10 RX ORDER — SODIUM CHLORIDE 9 MG/ML
INJECTION, SOLUTION INTRAVENOUS CONTINUOUS
Status: DISCONTINUED | OUTPATIENT
Start: 2019-10-10 | End: 2019-10-10 | Stop reason: HOSPADM

## 2019-10-10 RX ORDER — DIMENHYDRINATE 50 MG/ML
25 INJECTION, SOLUTION INTRAMUSCULAR; INTRAVENOUS
Status: DISCONTINUED | OUTPATIENT
Start: 2019-10-10 | End: 2019-10-10 | Stop reason: HOSPADM

## 2019-10-10 RX ORDER — ONDANSETRON 2 MG/ML
INJECTION INTRAMUSCULAR; INTRAVENOUS PRN
Status: DISCONTINUED | OUTPATIENT
Start: 2019-10-10 | End: 2019-10-10

## 2019-10-10 RX ORDER — NALOXONE HYDROCHLORIDE 0.4 MG/ML
.1-.4 INJECTION, SOLUTION INTRAMUSCULAR; INTRAVENOUS; SUBCUTANEOUS
Status: DISCONTINUED | OUTPATIENT
Start: 2019-10-10 | End: 2019-10-10 | Stop reason: HOSPADM

## 2019-10-10 RX ORDER — LIDOCAINE HYDROCHLORIDE 10 MG/ML
1-30 INJECTION, SOLUTION EPIDURAL; INFILTRATION; INTRACAUDAL; PERINEURAL
Status: COMPLETED | OUTPATIENT
Start: 2019-10-10 | End: 2019-10-10

## 2019-10-10 RX ORDER — DEXAMETHASONE SODIUM PHOSPHATE 4 MG/ML
INJECTION, SOLUTION INTRA-ARTICULAR; INTRALESIONAL; INTRAMUSCULAR; INTRAVENOUS; SOFT TISSUE PRN
Status: DISCONTINUED | OUTPATIENT
Start: 2019-10-10 | End: 2019-10-10

## 2019-10-10 RX ORDER — FENTANYL CITRATE 50 UG/ML
25-50 INJECTION, SOLUTION INTRAMUSCULAR; INTRAVENOUS
Status: DISCONTINUED | OUTPATIENT
Start: 2019-10-10 | End: 2019-10-10 | Stop reason: HOSPADM

## 2019-10-10 RX ORDER — ONDANSETRON 4 MG/1
4 TABLET, ORALLY DISINTEGRATING ORAL EVERY 30 MIN PRN
Status: DISCONTINUED | OUTPATIENT
Start: 2019-10-10 | End: 2019-10-10 | Stop reason: HOSPADM

## 2019-10-10 RX ORDER — IODIXANOL 320 MG/ML
150 INJECTION, SOLUTION INTRAVASCULAR ONCE
Status: COMPLETED | OUTPATIENT
Start: 2019-10-10 | End: 2019-10-10

## 2019-10-10 RX ORDER — METOPROLOL TARTRATE 1 MG/ML
1-2 INJECTION, SOLUTION INTRAVENOUS EVERY 5 MIN PRN
Status: DISCONTINUED | OUTPATIENT
Start: 2019-10-10 | End: 2019-10-10 | Stop reason: HOSPADM

## 2019-10-10 RX ORDER — HEPARIN SOD,PORCINE/0.9 % NACL 5K/1000 ML
1000-10000 INTRAVENOUS SOLUTION INTRAVENOUS
Status: COMPLETED | OUTPATIENT
Start: 2019-10-10 | End: 2019-10-10

## 2019-10-10 RX ORDER — ALBUTEROL SULFATE 0.83 MG/ML
2.5 SOLUTION RESPIRATORY (INHALATION) EVERY 4 HOURS PRN
Status: DISCONTINUED | OUTPATIENT
Start: 2019-10-10 | End: 2019-10-10 | Stop reason: HOSPADM

## 2019-10-10 RX ORDER — HYDRALAZINE HYDROCHLORIDE 20 MG/ML
2.5-5 INJECTION INTRAMUSCULAR; INTRAVENOUS EVERY 10 MIN PRN
Status: DISCONTINUED | OUTPATIENT
Start: 2019-10-10 | End: 2019-10-10 | Stop reason: HOSPADM

## 2019-10-10 RX ORDER — NICOTINE POLACRILEX 4 MG
15-30 LOZENGE BUCCAL
Status: DISCONTINUED | OUTPATIENT
Start: 2019-10-10 | End: 2019-10-10 | Stop reason: HOSPADM

## 2019-10-10 RX ORDER — SCOLOPAMINE TRANSDERMAL SYSTEM 1 MG/1
1 PATCH, EXTENDED RELEASE TRANSDERMAL ONCE
Status: COMPLETED | OUTPATIENT
Start: 2019-10-10 | End: 2019-10-10

## 2019-10-10 RX ORDER — AMPICILLIN AND SULBACTAM 2; 1 G/1; G/1
3 INJECTION, POWDER, FOR SOLUTION INTRAMUSCULAR; INTRAVENOUS
Status: COMPLETED | OUTPATIENT
Start: 2019-10-10 | End: 2019-10-10

## 2019-10-10 RX ORDER — LIDOCAINE 40 MG/G
CREAM TOPICAL
Status: DISCONTINUED | OUTPATIENT
Start: 2019-10-10 | End: 2019-10-10 | Stop reason: HOSPADM

## 2019-10-10 RX ORDER — AMPICILLIN AND SULBACTAM 1; .5 G/1; G/1
INJECTION, POWDER, FOR SOLUTION INTRAMUSCULAR; INTRAVENOUS PRN
Status: DISCONTINUED | OUTPATIENT
Start: 2019-10-10 | End: 2019-10-10

## 2019-10-10 RX ORDER — MEPERIDINE HYDROCHLORIDE 25 MG/ML
12.5 INJECTION INTRAMUSCULAR; INTRAVENOUS; SUBCUTANEOUS
Status: DISCONTINUED | OUTPATIENT
Start: 2019-10-10 | End: 2019-10-10 | Stop reason: HOSPADM

## 2019-10-10 RX ORDER — PROPOFOL 10 MG/ML
INJECTION, EMULSION INTRAVENOUS PRN
Status: DISCONTINUED | OUTPATIENT
Start: 2019-10-10 | End: 2019-10-10

## 2019-10-10 RX ORDER — ONDANSETRON 2 MG/ML
4 INJECTION INTRAMUSCULAR; INTRAVENOUS EVERY 30 MIN PRN
Status: DISCONTINUED | OUTPATIENT
Start: 2019-10-10 | End: 2019-10-10 | Stop reason: HOSPADM

## 2019-10-10 RX ORDER — KETAMINE HYDROCHLORIDE 10 MG/ML
INJECTION, SOLUTION INTRAMUSCULAR; INTRAVENOUS PRN
Status: DISCONTINUED | OUTPATIENT
Start: 2019-10-10 | End: 2019-10-10

## 2019-10-10 RX ORDER — SODIUM CHLORIDE, SODIUM LACTATE, POTASSIUM CHLORIDE, CALCIUM CHLORIDE 600; 310; 30; 20 MG/100ML; MG/100ML; MG/100ML; MG/100ML
INJECTION, SOLUTION INTRAVENOUS CONTINUOUS PRN
Status: DISCONTINUED | OUTPATIENT
Start: 2019-10-10 | End: 2019-10-10

## 2019-10-10 RX ORDER — FENTANYL CITRATE 50 UG/ML
INJECTION, SOLUTION INTRAMUSCULAR; INTRAVENOUS PRN
Status: DISCONTINUED | OUTPATIENT
Start: 2019-10-10 | End: 2019-10-10

## 2019-10-10 RX ORDER — DEXTROSE MONOHYDRATE 25 G/50ML
25-50 INJECTION, SOLUTION INTRAVENOUS
Status: DISCONTINUED | OUTPATIENT
Start: 2019-10-10 | End: 2019-10-10 | Stop reason: HOSPADM

## 2019-10-10 RX ORDER — LIDOCAINE HYDROCHLORIDE 20 MG/ML
INJECTION, SOLUTION INFILTRATION; PERINEURAL PRN
Status: DISCONTINUED | OUTPATIENT
Start: 2019-10-10 | End: 2019-10-10

## 2019-10-10 RX ORDER — SODIUM CHLORIDE, SODIUM LACTATE, POTASSIUM CHLORIDE, CALCIUM CHLORIDE 600; 310; 30; 20 MG/100ML; MG/100ML; MG/100ML; MG/100ML
INJECTION, SOLUTION INTRAVENOUS CONTINUOUS
Status: DISCONTINUED | OUTPATIENT
Start: 2019-10-10 | End: 2019-10-10 | Stop reason: HOSPADM

## 2019-10-10 RX ADMIN — ONDANSETRON 4 MG: 2 INJECTION INTRAMUSCULAR; INTRAVENOUS at 13:56

## 2019-10-10 RX ADMIN — ROCURONIUM BROMIDE 50 MG: 10 INJECTION INTRAVENOUS at 14:35

## 2019-10-10 RX ADMIN — SUGAMMADEX 200 MG: 100 INJECTION, SOLUTION INTRAVENOUS at 17:11

## 2019-10-10 RX ADMIN — PHENYLEPHRINE HYDROCHLORIDE 100 MCG: 10 INJECTION INTRAVENOUS at 14:28

## 2019-10-10 RX ADMIN — KETAMINE HYDROCHLORIDE 30 MG: 10 INJECTION, SOLUTION INTRAMUSCULAR; INTRAVENOUS at 14:22

## 2019-10-10 RX ADMIN — PHENYLEPHRINE HYDROCHLORIDE 100 MCG: 10 INJECTION INTRAVENOUS at 14:35

## 2019-10-10 RX ADMIN — FENTANYL CITRATE 100 MCG: 50 INJECTION, SOLUTION INTRAMUSCULAR; INTRAVENOUS at 14:50

## 2019-10-10 RX ADMIN — FENTANYL CITRATE 100 MCG: 50 INJECTION, SOLUTION INTRAMUSCULAR; INTRAVENOUS at 13:56

## 2019-10-10 RX ADMIN — ROCURONIUM BROMIDE 20 MG: 10 INJECTION INTRAVENOUS at 16:30

## 2019-10-10 RX ADMIN — HYDROCORTISONE SODIUM SUCCINATE 100 MG: 100 INJECTION, POWDER, FOR SOLUTION INTRAMUSCULAR; INTRAVENOUS at 11:19

## 2019-10-10 RX ADMIN — IODIXANOL 200 ML: 320 INJECTION, SOLUTION INTRAVASCULAR at 17:35

## 2019-10-10 RX ADMIN — ROCURONIUM BROMIDE 30 MG: 10 INJECTION INTRAVENOUS at 15:50

## 2019-10-10 RX ADMIN — ROCURONIUM BROMIDE 100 MG: 10 INJECTION INTRAVENOUS at 13:56

## 2019-10-10 RX ADMIN — SODIUM CHLORIDE, POTASSIUM CHLORIDE, SODIUM LACTATE AND CALCIUM CHLORIDE: 600; 310; 30; 20 INJECTION, SOLUTION INTRAVENOUS at 16:00

## 2019-10-10 RX ADMIN — DEXAMETHASONE SODIUM PHOSPHATE 8 MG: 4 INJECTION, SOLUTION INTRA-ARTICULAR; INTRALESIONAL; INTRAMUSCULAR; INTRAVENOUS; SOFT TISSUE at 13:56

## 2019-10-10 RX ADMIN — SODIUM CHLORIDE, POTASSIUM CHLORIDE, SODIUM LACTATE AND CALCIUM CHLORIDE: 600; 310; 30; 20 INJECTION, SOLUTION INTRAVENOUS at 13:50

## 2019-10-10 RX ADMIN — AMPICILLIN AND SULBACTAM 3 G: 1; 2 INJECTION, POWDER, FOR SOLUTION INTRAMUSCULAR; INTRAVENOUS at 14:05

## 2019-10-10 RX ADMIN — FENTANYL CITRATE 50 MCG: 50 INJECTION, SOLUTION INTRAMUSCULAR; INTRAVENOUS at 17:00

## 2019-10-10 RX ADMIN — HYDROMORPHONE HYDROCHLORIDE 0.5 MG: 1 INJECTION, SOLUTION INTRAMUSCULAR; INTRAVENOUS; SUBCUTANEOUS at 15:50

## 2019-10-10 RX ADMIN — LIDOCAINE HYDROCHLORIDE 7 ML: 10 INJECTION, SOLUTION EPIDURAL; INFILTRATION; INTRACAUDAL; PERINEURAL at 14:50

## 2019-10-10 RX ADMIN — PROPOFOL 200 MG: 10 INJECTION, EMULSION INTRAVENOUS at 13:56

## 2019-10-10 RX ADMIN — SCOPALAMINE 1 PATCH: 1 PATCH, EXTENDED RELEASE TRANSDERMAL at 11:20

## 2019-10-10 RX ADMIN — AMPICILLIN SODIUM AND SULBACTAM SODIUM 1.5 G: 1; .5 INJECTION, POWDER, FOR SOLUTION INTRAMUSCULAR; INTRAVENOUS at 16:10

## 2019-10-10 RX ADMIN — HEPARIN SODIUM 10000 UNITS: 1000 INJECTION INTRAVENOUS; SUBCUTANEOUS at 14:48

## 2019-10-10 RX ADMIN — LIDOCAINE HYDROCHLORIDE 100 MG: 20 INJECTION, SOLUTION INFILTRATION; PERINEURAL at 13:56

## 2019-10-10 ASSESSMENT — MIFFLIN-ST. JEOR: SCORE: 1934.75

## 2019-10-10 NOTE — PROGRESS NOTES
Interventional Radiology Intra-procedural Nursing Note    Patient Name: Ishan Arroyo  Medical Record Number: [id number]  Today's Date: October 10, 2019    Start  Time: 1430  Procedure: Visceral angiogram, portal vein embolization  Fire Safety Score: 1    Consent Review/Timeout Performed by: Dr. Daniel Pena  Procedure Performed By: Dr. Daniel pena    Procedure start time: 1430  Puncture time: 1435  Report given to: PACU staff to receive report directly from Anesthesia Staff upon transfer.  : NA    Other Notes:  Alert male transported via cart from Lor-Op to IR Procedure Room 1 for planned intervention.  ID band confirmed and patient acknowledges understanding of planned procedure. Patient repositioned to procedure table via hover-mat and positioned supine.  Patient prepped and draped per policy see VS flowsheet, MAR for further information.       Prior to procedure, distal pulses were identified and marked via palpation. Bilateral pulses are +2, +CMS.    Procedure performed. For complete details, please see Providers procedure note from event.    Patient condition post procedure is stable.   Patient returned to PACU for post-procedure monitoring and continuation of care.    Ludy Briones RN

## 2019-10-10 NOTE — OR NURSING
IR surgical fellows stopped at bedside to see the pt and to assess the puncture. They verified 4 hours bedrest  (till 2130), and pt needs to void before discharge. HOB can be up to 30 degrees, and pt can turn in bed, preferably to the surgical side. They recommended I call IR at #[phone] if I had further questions. No RX..

## 2019-10-10 NOTE — BRIEF OP NOTE
[facility], [place]    Brief Operative Note    Pre-operative diagnosis: Metastatic colorectal cancer (H) [C19]  Post-operative diagnosis Same  Procedure: Procedure(s):  ANESTHESIA OUT OF OR Viceral Embolization Of Portal Vein @1200  Surgeon: Surgeon(s) and Role:     * GENERIC ANESTHESIA PROVIDER - Primary     * Daniel Machado MD - Assisting   Assisting staff radiologist: Rupert Klein MD  Fellow: Sulma Ferrari MD  Anesthesia: General   Estimated blood loss: Less than 10 ml  Drains: None  Specimens: none  Findings:   right portal vein embolization.  Complications: None.  Implants:  none

## 2019-10-10 NOTE — ANESTHESIA PREPROCEDURE EVALUATION
Anesthesia Pre-Procedure Evaluation    Patient: Ishan Arroyo   MRN:     [id number] Gender:   male   Age:    59 year old :      1960        Preoperative Diagnosis: Metastatic colorectal cancer (H) [C19]   Procedure(s):  ANESTHESIA OUT OF OR Viceral Embolization Of Portal Vein @1200     No past medical history on file.   No past surgical history on file.       Anesthesia Evaluation     . Pt has had prior anesthetic. Type: General (Smalls 2 grade III; CMAC 4 grade I view)    No history of anesthetic complications          ROS/MED HX    ENT/Pulmonary:  - neg pulmonary ROS     Neurologic:  - neg neurologic ROS     Cardiovascular:     (+) Dyslipidemia, hypertension----. : . . . :. valvular problems/murmurs . Previous cardiac testing Echodate:19results:EF>55%, no signif valve dzdate: results: date: results: date: results:          METS/Exercise Tolerance:     Hematologic:  - neg hematologic  ROS       Musculoskeletal:  - neg musculoskeletal ROS       GI/Hepatic: Comment: Colon cancer with mets to liver s/p colectomy and chemotherapy     (+) GERD       Renal/Genitourinary:     (+) Nephrolithiasis ,       Endo:  - neg endo ROS       Psychiatric:  - neg psychiatric ROS       Infectious Disease:  - neg infectious disease ROS       Malignancy:   (+) Malignancy History of GI  GI CA  Active status post Surgery and Chemo,         Other:                         PHYSICAL EXAM:   Mental Status/Neuro: A/A/O   Airway: Facies: Feasible (beard)  Mallampati: II  Mouth/Opening: Full  TM distance: > 6 cm  Neck ROM: Full   Respiratory: Auscultation: CTAB     Resp. Rate: Normal     Resp. Effort: Normal      CV: Rhythm: Regular  Rate: Age appropriate  Heart: Normal Sounds  Edema: None   Comments:      Dental: Normal Dentition                LABS:  CBC:   Lab Results   Component Value Date    WBC 6.3 10/08/2019    HGB 14.3 10/08/2019    HCT 42.1 10/08/2019     10/08/2019     BMP:   Lab Results   Component Value Date    NA  "139 10/08/2019    POTASSIUM 4.1 10/08/2019    CHLORIDE 107 10/08/2019    CO2 27 10/08/2019    BUN 10 10/08/2019    CR 0.59 (L) 10/08/2019    GLC 91 10/08/2019     COAGS:   Lab Results   Component Value Date    INR 0.99 10/08/2019     POC: No results found for: BGM, HCG, HCGS  OTHER:   Lab Results   Component Value Date    IRENE 9.1 10/08/2019    ALBUMIN 3.7 10/08/2019    PROTTOTAL 7.6 10/08/2019    ALT 27 10/08/2019    AST 31 10/08/2019    ALKPHOS 108 10/08/2019    BILITOTAL 0.7 10/08/2019        Preop Vitals    BP Readings from Last 3 Encounters:   10/08/19 132/83   09/13/19 135/89    Pulse Readings from Last 3 Encounters:   10/08/19 69   09/13/19 73      Resp Readings from Last 3 Encounters:   09/13/19 16    SpO2 Readings from Last 3 Encounters:   10/08/19 96%   09/13/19 98%      Temp Readings from Last 1 Encounters:   09/13/19 36.9  C (98.5  F) (Oral)    Ht Readings from Last 1 Encounters:   09/13/19 1.854 m (6' 1\")      Wt Readings from Last 1 Encounters:   09/13/19 103.7 kg (228 lb 9.6 oz)    Estimated body mass index is 30.16 kg/m  as calculated from the following:    Height as of 9/13/19: 1.854 m (6' 1\").    Weight as of 9/13/19: 103.7 kg (228 lb 9.6 oz).     LDA:        Assessment:   ASA SCORE: 3    H&P: History and physical reviewed and following examination; no interval change.   Smoking Status:  Non-Smoker/Unknown        Plan:   Anes. Type:  General   Pre-Medication: None   Induction:  IV (Standard)   Airway: ETT; Oral; CMAC/VL   Access/Monitoring: PIV; 2nd PIV   Maintenance: Balanced     Postop Plan:   Postop Pain: Opioids  Postop Sedation/Airway: Not planned  Disposition: Outpatient     PONV Management:   Adult Risk Factors:, Non-Smoker, Postop Opioids   Prevention: Ondansetron, Dexamethasone     CONSENT: Direct conversation   Plan and risks discussed with: Patient          Comments for Plan/Consent:  01/22/19; Mask Ventilate: Easy: 8; Smalls 2 grade III view, then CMAC blade 4 used, grade I view. Cricoid " pressure,Viewable Anatomy: Epiglottis, Arytenoid, Vocal cords                  Tate Desir MD

## 2019-10-10 NOTE — DISCHARGE INSTRUCTIONS
[facility]  Same-Day Surgery   Adult Discharge Orders & Instructions     For 24 hours after surgery    1. Get plenty of rest.  A responsible adult must stay with you for at least 24 hours after you leave the hospital.   2. Do not drive or use heavy equipment.  If you have weakness or tingling, don't drive or use heavy equipment until this feeling goes away.  3. Do not drink alcohol.  4. Avoid strenuous or risky activities.  Ask for help when climbing stairs.   5. You may feel lightheaded.  IF so, sit for a few minutes before standing.  Have someone help you get up.   6. If you have nausea (feel sick to your stomach): Drink only clear liquids such as apple juice, ginger ale, broth or 7-Up.  Rest may also help.  Be sure to drink enough fluids.  Move to a regular diet as you feel able.  7. You may have a slight fever. Call the doctor if your fever is over 100 F (37.7 C) (taken under the tongue) or lasts longer than 24 hours.  8. You may have a dry mouth, a sore throat, muscle aches or trouble sleeping.  These should go away after 24 hours.  9. Do not make important or legal decisions.   Call your doctor for any of the followin.  Signs of infection (fever, growing tenderness at the surgery site, a large amount of drainage or bleeding, severe pain, foul-smelling drainage, redness, swelling).    2. It has been over 8 to 10 hours since surgery and you are still not able to urinate (pass water).    3.  Headache for over 24 hours.      To contact a doctor, call Interventional Radiology from 8 am to 5 pm @ [phone]. After hours call [phone] and ask for MD on call for Interventional Radiology or:    x   [phone] and ask for the resident on call for   Interventional Radiology (answered 24 hours a day)  x   Emergency Department:    [facility]: [phone]       (TTY for hearing impaired: [phone])      Scopolamine Patch- (Absorbed through the skin)    Prevents  nausea and vomiting caused by motion sickness or anesthesia and surgery in adults.    Brand Name(s): Transderm Scop, Transderm-Scope  There may be other brand names for this medicine.    When This Medicine Should Not Be Used:  You should not use this medicine if you have had an allergic reaction to scopolomine, or if you have narrow angle glaucoma.    How to Use This Medicine:  Patch      Your doctor will tell you how many patches to use, where to apply them, and how often to apply them. Do not use more patches or apply them more often than your doctor tells you to.    This medicine comes with patient instructions. Read and follow these instructions carefully. Ask your doctor or pharmacist if you have any questions.    To prevent motion sickness, apply the patch at least 4 hours before you need it.    Wash and dry your hands thoroughly before applying the patch.    Leave the patch in its sealed wrapper until you are ready to put it on. Tear the wrapper open carefully. NEVER CUT the wrapper or the patch with scissors. Do not use any patch that has been cut by accident.    Take the liner off the sticky side before applying.    Apply the patch to dry, hairless skin behind the ear.    If the patch is loose or falls off,apply a new patch at a different place behind the ear.    After you take off the patch, wash the place where the patch was and your hands thoroughly.    Only one patch should be used at any time.    If a dose is missed:      If you forget to wear or change a patch, put one on as soon as you can. If it is almost time to put on your next patch, wait until then to apply a new patch and skip the one you missed. Do not apply extra patches to make up for a missed dose.    How to Store and Dispose of This Medicine:      Store the patches at room temperature in a closed container, away from heat, moisture, and direct light.    Fold the used patch in half with the sticky sides together. Throw any used patch away  so that children or pets cannot get to it. You will also need to throw away old patches after the expiration date has passed.    Keep all medicine away from children and never share your medicine with anyone.    Drugs and Foods to Avoid:  Ask your doctor or pharmacist before using any other medicine, including over-the-counter medicines, vitamins, and herbal products.      Tell your doctor if you are using any medicines that make you sleepy. These include sleeping pills, cold and allergy medicine, narcotic pain relievers, and sedatives.     Do not drink alcohol while you are using this medicine.    Warnings While Using This Medicine:      Make sure your doctor knows if you are pregnant or breastfeeding, or if you have glaucoma, prostate problems, trouble urinating, blocked bowels, liver disease, kidney disease, or a history of seizures or mental illness.    This medicine can cause blurring of vision and other vision problems if it comes in contact with the eyes. This medicine may also cause problems with urination. If any of these reactions occur, remove the patch and call your doctor right away.    This medicine may make you dizzy or drowsy. Avoid driving, using machines, or doing anything else that could be dangerous if you are not alert. If you plan to participate in underwater sports, this medicine may cause disorienting effects. If this is a concern for you, talk with your doctor.    This medicine may make you sweat less and cause your body to get too hot. Be careful in hot weather, when your are exercising, or if using sauna or whirlpool.    Make sure any doctor or dentist who treats you knows that you are using this medicine. This medicine may affect the results of certain medical tests.    Skin burns have been reported at the patch site in several patients wearing an aluminized transdermal system during a magnetic resonance imaging scan (MRI). Because Transderm Scop contains aluminum, it is recommended to  remove the system before undergoing an MRI.    Possible Side Effects While Using This Medicine:  Call your doctor right away if you notice any of these side effects:      Allergic reaction: Itching or hives, swelling in your face or hands, swelling or tingling in your mouth or throat, chest tightness, trouble breathing.    Blurred vision,    Confusion or memory loss.    Fast,slow, or uneven heartbeat.    Lightheadedness, dizziness, drowsiness, or fainting.    Seeing, hearing, or feeling things that are not there.    Severe eye pain.    Trouble urinating.      If you notice these less serious side effects, talk with your doctor:      Dry mouth.    Dry, itchy, or red eyes.    Restlessness    Skin rash or redness.    If you notice other side effects that you think are caused by this medicine, tell your doctor immediately.      Surgical instructions:    You may resume your diet. Start slowly with liquids and advance as you tolerate.  There are no lifting or activity restrictions because of this procedure.  You may remove the dressing tomorrow 10/11/19, but do not shower till Saturday 10/12/19.  You may take tylenol for pain.  Please watch for any bleeding, heat, redness, swelling, increased pain, drainage from the puncture site  or fever. Call for any of these signs.  Call if you have nausea and vomiting, or any shortness of breath, or severe abdominal pain.

## 2019-10-10 NOTE — ANESTHESIA CARE TRANSFER NOTE
Patient: Ishan Arroyo    Procedure(s):  ANESTHESIA OUT OF OR Viceral Embolization Of Portal Vein @1200    Diagnosis: Metastatic colorectal cancer (H) [C19]  Diagnosis Additional Information: No value filed.    Anesthesia Type:   General     Note:  Airway :Nasal Cannula  Patient transferred to:PACU  Comments: Pt extubated in the OR without incident or complications. Pt VSS upon arrival to the PACU. Pt has no c/o pain/N/V. Pt care report given to receiving RN> Handoff Report: Identifed the Patient, Identified the Reponsible Provider, Reviewed the pertinent medical history, Discussed the surgical course, Reviewed Intra-OP anesthesia mangement and issues during anesthesia, Set expectations for post-procedure period and Allowed opportunity for questions and acknowledgement of understanding      Vitals: (Last set prior to Anesthesia Care Transfer)    CRNA VITALS  10/10/2019 1650 - 10/10/2019 1727      10/10/2019             Resp Rate (observed):  (!) 3                Electronically Signed By: STEVE Alonzo CRNA  October 10, 2019  5:27 PM

## 2019-10-11 NOTE — ANESTHESIA POSTPROCEDURE EVALUATION
Anesthesia POST Procedure Evaluation    Patient: Ishan Arroyo   MRN:     [id number] Gender:   male   Age:    59 year old :      1960        Preoperative Diagnosis: Metastatic colorectal cancer (H) [C19]   Procedure(s):  ANESTHESIA OUT OF OR Viceral Embolization Of Portal Vein @1200   Postop Comments: No value filed.       Anesthesia Type:  Not documented  General    Reportable Event: NO     PAIN: Uncomplicated   Sign Out status: Comfortable, Well controlled pain     PONV: No PONV   Sign Out status:  No Nausea or Vomiting     Neuro/Psych: Uneventful perioperative course   Sign Out Status: Preoperative baseline; Age appropriate mentation     Airway/Resp.: Uneventful perioperative course   Sign Out Status: Non labored breathing, age appropriate RR; Resp. Status within EXPECTED Parameters     CV: Uneventful perioperative course   Sign Out status: Appropriate BP and perfusion indices; Appropriate HR/Rhythm     Disposition:   Sign Out in:  PACU  Disposition:  Floor  Recovery Course: Uneventful  Follow-Up: Not required           Last Anesthesia Record Vitals:  CRNA VITALS  10/10/2019 1650 - 10/10/2019 1750      10/10/2019             Resp Rate (observed):  (!) 3          Last PACU Vitals:  Vitals Value Taken Time   /70 10/10/2019  7:00 PM   Temp 37.3  C (99.2  F) 10/10/2019  7:00 PM   Pulse 88 10/10/2019  7:00 PM   Resp 17 10/10/2019  7:00 PM   SpO2 93 % 10/10/2019  7:12 PM   Temp src     NIBP     Pulse     SpO2     Resp     Temp     Ht Rate     Temp 2     Vitals shown include unvalidated device data.      Electronically Signed By: Pasquale Liriano MD, October 10, 2019, 7:13 PM

## 2019-10-11 NOTE — OR NURSING
Pt dressed and walked around in pacu. NAD noted. Pt tolerated without any difficulty. vss stable after ambulation.

## 2019-10-11 NOTE — OR NURSING
Writing nurse took over patient care at 1900. Patient on phase 1 hold while on bedrest until 2130.

## 2019-10-15 ENCOUNTER — TELEPHONE (OUTPATIENT)
Dept: VASCULAR SURGERY | Facility: CLINIC | Age: 59
End: 2019-10-15

## 2019-10-15 NOTE — TELEPHONE ENCOUNTER
Called to f/u on phone call that daughter made over the weekend.     Followed up on him regarding his pain with deep breaths.     He states that he still does have some pain but not as much as over the weekend. Much improvement  In which it's just a short sharp pain.     He states that he's feeling better and is back to work today. He did work on doing deep inspiration to help open up the lungs and just laid in bed on Sat.     I informed him that this is good to hear. I'm glad that he's doing better and that he doesn't have the shortness of breath or pain with inspiration now.     I've asked him to keep us in touch should he feel this again prior to his f/u as well as his surgery.     He does know that he has a f/u in Nov with Dr. Luong's team.   We will let them know that all is well at this time and he will call with any other questions.     Carlotta WILDER RN, BSN  Interventional Radiology Nurse Coordinator   Phone: [phone]

## 2019-11-07 PROBLEM — Z00.00 HEALTH CARE MAINTENANCE: Status: ACTIVE | Noted: 2019-07-01

## 2019-11-07 PROBLEM — Z01.818 PREOPERATIVE EXAMINATION: Status: ACTIVE | Noted: 2019-09-17

## 2019-11-07 PROBLEM — C78.7 COLON CANCER METASTASIZED TO LIVER (H): Status: ACTIVE | Noted: 2019-01-22

## 2019-11-07 PROBLEM — E78.00 HIGH CHOLESTEROL: Status: ACTIVE | Noted: 2019-11-07

## 2019-11-07 PROBLEM — D50.9 IRON DEFICIENCY ANEMIA: Status: ACTIVE | Noted: 2019-01-11

## 2019-11-07 PROBLEM — C18.9 MALIGNANT NEOPLASM OF COLON (H): Status: ACTIVE | Noted: 2019-01-22

## 2019-11-07 PROBLEM — N20.0 NEPHROLITHIASIS: Status: ACTIVE | Noted: 2019-06-11

## 2019-11-07 PROBLEM — C18.9 COLON CANCER METASTASIZED TO LIVER (H): Status: ACTIVE | Noted: 2019-01-22

## 2019-11-08 ENCOUNTER — OFFICE VISIT (OUTPATIENT)
Dept: SURGERY | Facility: CLINIC | Age: 59
End: 2019-11-08
Attending: SURGERY
Payer: COMMERCIAL

## 2019-11-08 ENCOUNTER — ANCILLARY PROCEDURE (OUTPATIENT)
Dept: CT IMAGING | Facility: CLINIC | Age: 59
End: 2019-11-08
Attending: SURGERY
Payer: COMMERCIAL

## 2019-11-08 ENCOUNTER — ANESTHESIA EVENT (OUTPATIENT)
Dept: SURGERY | Facility: CLINIC | Age: 59
End: 2019-11-08

## 2019-11-08 VITALS
SYSTOLIC BLOOD PRESSURE: 148 MMHG | DIASTOLIC BLOOD PRESSURE: 89 MMHG | TEMPERATURE: 98.6 F | OXYGEN SATURATION: 97 % | HEART RATE: 75 BPM | WEIGHT: 234 LBS | HEIGHT: 74 IN | BODY MASS INDEX: 30.03 KG/M2 | RESPIRATION RATE: 16 BRPM

## 2019-11-08 VITALS
SYSTOLIC BLOOD PRESSURE: 147 MMHG | RESPIRATION RATE: 19 BRPM | OXYGEN SATURATION: 97 % | HEIGHT: 74 IN | TEMPERATURE: 98.6 F | HEART RATE: 75 BPM | BODY MASS INDEX: 30.03 KG/M2 | DIASTOLIC BLOOD PRESSURE: 83 MMHG | WEIGHT: 234 LBS

## 2019-11-08 DIAGNOSIS — Z01.818 PRE-OPERATIVE GENERAL PHYSICAL EXAMINATION: ICD-10-CM

## 2019-11-08 DIAGNOSIS — C78.7 COLON CANCER METASTASIZED TO LIVER (H): Primary | ICD-10-CM

## 2019-11-08 DIAGNOSIS — C18.9 METASTATIC COLON CANCER TO LIVER (H): ICD-10-CM

## 2019-11-08 DIAGNOSIS — C18.9 COLON CANCER METASTASIZED TO LIVER (H): Primary | ICD-10-CM

## 2019-11-08 DIAGNOSIS — Z01.818 PRE-OPERATIVE GENERAL PHYSICAL EXAMINATION: Primary | ICD-10-CM

## 2019-11-08 DIAGNOSIS — C78.7 METASTATIC COLON CANCER TO LIVER (H): ICD-10-CM

## 2019-11-08 PROCEDURE — G0463 HOSPITAL OUTPT CLINIC VISIT: HCPCS | Mod: ZF

## 2019-11-08 PROCEDURE — 40000114 ZZH STATISTIC NO CHARGE CLINIC VISIT

## 2019-11-08 RX ORDER — ACETAMINOPHEN 500 MG
500-1000 TABLET ORAL EVERY 6 HOURS PRN
COMMUNITY

## 2019-11-08 RX ORDER — IOPAMIDOL 755 MG/ML
135 INJECTION, SOLUTION INTRAVASCULAR ONCE
Status: COMPLETED | OUTPATIENT
Start: 2019-11-08 | End: 2019-11-08

## 2019-11-08 RX ADMIN — IOPAMIDOL 135 ML: 755 INJECTION, SOLUTION INTRAVASCULAR at 08:43

## 2019-11-08 ASSESSMENT — ENCOUNTER SYMPTOMS
MEMORY LOSS: 0
DISTURBANCES IN COORDINATION: 0
DIFFICULTY URINATING: 0
FLANK PAIN: 0
PARALYSIS: 0
NUMBNESS: 1
HEMATURIA: 0
DIZZINESS: 0
SEIZURES: 0
DYSURIA: 0
LOSS OF CONSCIOUSNESS: 0
TINGLING: 0
TREMORS: 0
HEADACHES: 0
WEAKNESS: 0
SPEECH CHANGE: 0

## 2019-11-08 ASSESSMENT — PAIN SCALES - GENERAL
PAINLEVEL: NO PAIN (0)
PAINLEVEL: NO PAIN (0)

## 2019-11-08 ASSESSMENT — MIFFLIN-ST. JEOR
SCORE: 1951.17
SCORE: 1951.17

## 2019-11-08 NOTE — H&P
Pre-Operative H & P     CC:  Preoperative exam to assess for increased cardiopulmonary risk while undergoing surgery and anesthesia.    Date of Encounter: 11/8/2019  Primary Care Physician:  Goran Lopez  Ishan Arroyo is a 59 year old male who presents for pre-operative H & P in preparation for exploratory lap, open liver resection, possible ablation, cholycystectomy with intraoperative US, with Dr. Lele Luong and Dr. Jaime Gallardo, [facility], in continued treatment of metastatic liver ds.  He had colon cancer in January, S/P resection of priomary tumor,  and chemotherapy,  and did well so decision made to resect the synchronous liver mets. He is S/P embolization.  He has no pain, is feeling his usual and quite active.,  He has co morbid conditions of HTN, HLD, obesity, former tobacco chewer, GERD.     History is obtained from the patient.     Past Medical History  HTN  HLD  Colon cancer with liver metastases  Obesity  Blood transfusion 1/2019  GERD  Renal stones    Past Surgical History  Past Surgical History:   Procedure Laterality Date     IR VISCERAL EMBOLIZATION  10/10/2019   Colectomy 1/22/19  tonsilectomy  [place] teeth removal      Hx of Blood transfusions/reactions: yes for HGB 5 due to blood loss in setting of surgery     Hx of abnormal bleeding or anti-platelet use: on ASA    Menstrual history: No LMP for male patient.      Steroid use in the last year: Decadron    Personal or FH with difficulty with Anesthesia:  no    Prior to Admission Medications  Current Outpatient Medications   Medication Sig Dispense Refill     acetaminophen (TYLENOL) 500 MG tablet Take 500-1,000 mg by mouth every 6 hours as needed for mild pain (Pt last took 11/6/19)       metoprolol succinate ER (TOPROL-XL) 100 MG 24 hr tablet Take 100 mg by mouth At Bedtime   1     Multiple Vitamins-Minerals (MULTI VITAMIN  /MINERALS) TABS Take 1 tablet by mouth every morning         "simvastatin (ZOCOR) 20 MG tablet Take 20 mg by mouth At Bedtime        lidocaine-prilocaine (EMLA) 2.5-2.5 % external cream Apply topically as needed (Pt. has not used since August 2019. 11/8/19)          Allergies  No Known Allergies    Social History  Social History     Socioeconomic History     Marital status: Single     Number of children: 2 children     Highest education level: Not on file     Tobacco Use     Smoking status: Never Smoker     Smokeless tobacco: Current User     Types: Chew     Family History:  Father and uncle MI age 50s - heavy smokers      Anesthesia Evaluation  Pt has had prior anesthetic. Type: General and MAC    History of anesthetic complications   - motion sickness        ROS/MED HX    ENT/Pulmonary:     (+)MATHEUS risk factors hypertension, male, age > 50   Neurologic:     (+)neuropathy - chemo related,     Cardiovascular:     (+) Dyslipidemia, hypertension   Previous cardiac testing Echodate:r1/14/19  :ECG 9/2019        METS/Exercise Tolerance:  4 - Raking leaves, gardening   Hematologic:     (+) Anemia, History of Transfusion -      Musculoskeletal:  - neg musculoskeletal ROS       GI/Hepatic:     (+) liver disease,       Renal/Genitourinary:     (+) Nephrolithiasis ,       Endo:    negative     Psychiatric:  - neg psychiatric ROS       Infectious Disease:  - neg infectious disease ROS       Malignancy:   (+) Malignancy History of GI and Other  GI CA status post Surgery and Chemo, Other CA embolization status post         Other:    (+) No chance of pregnancy no H/O Chronic Pain,no other significant disability              The complete review of systems is negative other than noted in the HPI or here.   Temp: 98.6  F (37  C) Temp src: Oral BP: (!) 147/83 Pulse: 75   Resp: 19 SpO2: 97 %         234 lbs 0 oz  6' 2\"   Body mass index is 30.04 kg/m .       Physical Exam  Constitutional: Awake, alert, cooperative, no apparent distress, and appears stated age.  Eyes: Pupils equal, round and " reactive to light, extra ocular muscles intact, sclera clear, conjunctiva normal.  HENT: Normocephalic, oral pharynx with moist mucus membranes, good dentition. No goiter appreciated.   Respiratory: Clear to auscultation bilaterally, no crackles or wheezing.  Cardiovascular: Regular rate and rhythm, normal S1 and S2, and no murmur noted.  Carotids +2, no bruits. No LE edema. Palpable pulses to radial  arteries.   GI: Normal bowel sounds, soft, non-distended, non-tender, no masses palpated, no hepatosplenomegaly.    Lymph/Hematologic: No cervical lymphadenopathy and no supraclavicular lymphadenopathy.  Genitourinary:  deferred  Skin: Warm and dry.  No rashes at anticipated surgical site.   Musculoskeletal:Decreased extension  ROM of neck. There is no redness, warmth, or swelling of the joints. Gross motor strength is normal.    Neurologic: Awake, alert, oriented to name, place and time. Cranial nerves II-XII are grossly intact. Gait is normal.   Neuropsychiatric: Calm, cooperative. Normal affect.     Labs: (personally reviewed)  Lab Results   Component Value Date    WBC 6.3 10/10/2019     Lab Results   Component Value Date    RBC 4.45 10/10/2019     Lab Results   Component Value Date    HGB 13.6 10/10/2019     Lab Results   Component Value Date    HCT 42.0 10/10/2019     Lab Results   Component Value Date    MCV 94 10/10/2019     Lab Results   Component Value Date    MCH 30.6 10/10/2019     Lab Results   Component Value Date    MCHC 32.4 10/10/2019     Lab Results   Component Value Date    RDW 18.5 10/10/2019     Lab Results   Component Value Date     10/10/2019     Last Comprehensive Metabolic Panel:  Sodium   Date Value Ref Range Status   10/10/2019 138 133 - 144 mmol/L Final     Potassium   Date Value Ref Range Status   10/10/2019 3.8 3.4 - 5.3 mmol/L Final     Chloride   Date Value Ref Range Status   10/10/2019 106 94 - 109 mmol/L Final     Carbon Dioxide   Date Value Ref Range Status   10/10/2019 27 20 -  32 mmol/L Final     Anion Gap   Date Value Ref Range Status   10/10/2019 5 3 - 14 mmol/L Final     Glucose   Date Value Ref Range Status   10/10/2019 84 70 - 99 mg/dL Final     Urea Nitrogen   Date Value Ref Range Status   10/10/2019 13 7 - 30 mg/dL Final     Creatinine   Date Value Ref Range Status   10/10/2019 0.59 (L) 0.66 - 1.25 mg/dL Final     GFR Estimate   Date Value Ref Range Status   10/10/2019 >90 >60 mL/min/[1.73_m2] Final     Comment:     Non  GFR Calc  Starting 12/18/2018, serum creatinine based estimated GFR (eGFR) will be   calculated using the Chronic Kidney Disease Epidemiology Collaboration   (CKD-EPI) equation.       Calcium   Date Value Ref Range Status   10/10/2019 8.6 8.5 - 10.1 mg/dL Final       ASSESSMENT and PLAN  Ishan Arroyo is a 59 year old male scheduled to undergo exploratory lap, open liver resection, possible ablation, cholycystectomy with intraoperative US, with Dr. Lele Luong and Dr. Jaime Gallardo, in continued treatment of liver metastatic ds.    Pre-operative considerations include:  1.) CV: Status independent/exercise tolerance > 4 METS. Risks: HTN (metoprolol); HLD (Zocor).  EKG: read as unremarkable by PCP 9/20/19, no changes. Image not available  ECHO: 1/14/19: EF > 55%. No significant valvar ds.   RCRI = 1 reflecting 0.9% risk of major adverse cardiac event.    2.) Pulmonary: Never smoked. Former tobacco chewer. No pulmonary dx, sx or meds.   Obesity BMI 30. MATHEUS risk of male > age 50 with HTN = 3/8 = intermediate risk / no formal testing    3.) Heme: Hx anemia-last HGB 9/17/19 was 13. No bleeding or clotting dysfunction.   type and screen  VTE risk is elevated in this patient due to diagnosis of cancer    4.) GI: GERD on PPI which he will take DOS. 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  Procedure as  planned  PONV score = 1 (2 or > antiemetic prophylaxis recommended)        Patient was discussed with Dr Mayo.  Patient is optimized and is acceptable candidate for the proposed procedure.  No further diagnostic evaluation is needed.     KAREN Tao  Preoperative Assessment Center  [facility]  Clinic and [facility] Center  Phone: [phone]  Fax: [phone]

## 2019-11-08 NOTE — PATIENT INSTRUCTIONS
Preparing for Your Surgery      Name:  Ishan Arroyo   MRN:  [id number]   :  1960   Today's Date:  2019     Arriving for surgery:  Surgery date:  19  Arrival time:  5:30 am    Please come to:     [facility] [address]  [address] [place]  [address]    - ? parking is available in front of the hospital      -    Please proceed to [address] on the 3rd floor. [phone]?     - ?If you are in need of directions, wheelchair or escort please stop at the Information Desk in the lobby.  Inform the information person that you are here for surgery; a wheelchair and escort to [address] will be provided.?     -  Bring your ID and insurance card.    - If you are scheduled to go home the Same Day as surgery you must have a responsible adult as a  and to stay with you overnight the first 24 hours after surgery.     What can I eat or drink?  -  You may have solid food or milk products until 8 hours prior to your surgery. (Until 12 midnight )  -  You may have water, apple juice or 7up/Sprite until 2 hours prior to your surgery. (Until 5:30 am- Stop on arrival to hospital. )    Which medicines can I take?       -  Do not bring your own medications to the hospital.        -  Follow Oncology Clinic instructions regarding Ibuprofen. If no instructions given, NO Ibuprofen the day prior to surgery.          -  Hold Naproxen (Aleve) 2 days prior to surgery.        -  Hold Multivitamin starting now, if you haven't already.    -  Please take these medications the morning of surgery:  Acetaminophen (Tylenol) if needed    How do I prepare myself?  -  Take two showers: one the night before surgery; and one the morning of surgery.         Use Scrubcare or Hibiclens to wash from neck down.  You may use your own shampoo and conditioner. No other hair products.   -  Do NOT use lotion, powder, colognes, deodorant, or antiperspirant the day of your surgery.  -  Do NOT wear any  gurdeep.    Questions or Concerns:  If you have questions or concerns prior to your surgery, call [phone]. (Mon - Fri   8 am- 5:30 pm)  Questions about surgery, contact your Surgeons office.      AFTER YOUR SURGERY  Breathing exercises   Breathing exercises help you recover faster. Take deep breaths and let the air out slowly. This will:     Help you wake up after surgery.    Help prevent complications like pneumonia.  Preventing complications will help you go home sooner.   We may give you a breathing device (incentive spirometer) to encourage you to breathe deeply.   Nausea and vomiting   You may feel sick to your stomach after surgery; if so, let your nurse know.    Pain control:  After surgery, you may have pain. Our goal is to help you manage your pain. Pain medicine will help you feel comfortable enough to do activities that will help you heal.  These activities may include breathing exercises, walking and physical therapy.   To help your health care team treat your pain we will ask: 1) If you have pain  2) where it is located 3) describe your pain in your words  Methods of pain control include medications given by mouth, vein or by nerve block for some surgeries.  Sequential Compression Device (SCD) or Pneumo Boots:  You may need to wear SCD S on your legs or feet. These are wraps connected to a machine that pumps in air and releases it. The repeated pumping helps prevent blood clots from forming.

## 2019-11-08 NOTE — LETTER
11/8/2019       RE: Ishan Arroyo  [address]  [place] [address]     Dear Colleague,    Thank you for referring your patient, Ishan Arroyo, to the [facility]. Please see a copy of my visit note below.    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  Two lesions, 1 in the right hepatic dome, segment 8 abutting the right hepatic vein and encroaching close to the vena cava. The second in deep in segment 7.  Liver cysts stable.  Last dose of CAPEOX-antiEGFR was 8/30/19.   I saw the patient in clinic 9/13/2019 and liver volumetry demonstrated a marginal sFLR, as such, I recommended the patient undergo right PVE to generate a left FLR > 30%.  PVE was performed with microspheres and coils 10/10/2019.  The patient now presents for re-evaluation of sFLR with new volumetry 4 weeks following his PVE.  He is healthy and active with good performance status.  Non-smoker.  Minimal alcohol.  No history of hepatitis or cirrhosis.  He takes a beta blocker for HTN and a statin for HLD.     Pertinent Work-Up/Findings: Recent liver MRI shows a lesion in the dome, segment 8 abutting the right hepatic vein and lying close to the vena cava.  A second lesion is deep in segment 7.  No other evidence of metastatic disease by my read, but official radiology read is pending.    CT volumetry 11/8/2019 shows a TLV of 2343 ml (2231 ml on 9/20/19 CT volumetry).  The measured FLR is 580 ml (660 ml on 9/20/19 CT volumetry).  Thus, the FLR = 24.7%.    CT volumetry on 9/20/19, using the same calculations above, showed an FLR = 29.6%.    Even with accounting for differences in measurement of both sides, it appears the left liver did not hypertrophy following right PVE.    On most recent CT volumetry, there is  demonstration of coils in the right portal vein segments.    Pertinent Exam: Abdomen soft, non-tender, and non-distended.  No jaundice or scleral icterus.  Well healed lower midline laparotomy.     Assessment/Counseling/Plan: 59 M w/ colorectal cancer liver metastases in segments 8 and 7 with good response to 11 cycles of systemic therapy with CAPEOX-antiEGFR.  His disease appears to be resectable with formal right hepatectomy given the abutment of the right hepatic vein in a proximal location.  I  re-discussed the risks, benefits, and alternatives of the surgery.  Risks including, but not limited to death, DVT, PE, PNA, cardiac complications, UTI, injury to adjacent organs, infection, bleeding, bile leak, and hepatic failure were discussed.  Given his chemotherapy, need for major hepatectomy for the best oncologic outcomes, and need for an FLR of greater than 30%, I recommended right PVE given an initial FLR = 29.6% based on CT volumetry.  At the time of the clinic appointment, updated CT volumetry was not available to discuss with the patient.  As such, I originally discussed that I would contact the patient with his updated volumetry given this would effect whether or not we would proceed with surgery this upcoming week.  All questions were answered at that time and the patient was in agreement with and understanding of the plan.      Updated CT volumetry returned later in the day.  Unfortunately, he did not have growth of the left side (FLR = 24.7%) as I would expect given right PVE.  Thus, I am concerned he will not tolerate the planned major hepatectomy.  I plan on contacting the patient prior to surgery to discuss this concern and not proceeding with surgery.  I plan to discuss with IR if repeat embolization should be attempted.  In addition, we can repeat imaging in another 2 weeks to see if any hypertrophy has occurred.  Based on those results, alternative treatments (chemotherapy, embolization, ablation,  etc.) to major hepatectomy may need to be pursued in order to minimize morbidity and mortality.     A total of 20 minutes of face to face time was spent on this case, of which, more than half was spent in counseling and coordination of care.    Again, thank you for allowing me to participate in the care of your patient.      Sincerely,    Lele Luong MD

## 2019-11-09 NOTE — PROGRESS NOTES
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  Two lesions, 1 in the right hepatic dome, segment 8 abutting the right hepatic vein and encroaching close to the vena cava. The second in deep in segment 7.  Liver cysts stable.  Last dose of CAPEOX-antiEGFR was 8/30/19.  I saw the patient in clinic 9/13/2019 and liver volumetry demonstrated a marginal sFLR, as such, I recommended the patient undergo right PVE to generate a left FLR > 30%.  PVE was performed with microspheres and coils 10/10/2019.  The patient now presents for re-evaluation of sFLR with new volumetry 4 weeks following his PVE.  He is healthy and active with good performance status.  Non-smoker.  Minimal alcohol.  No history of hepatitis or cirrhosis.  He takes a beta blocker for HTN and a statin for HLD.     Pertinent Work-Up/Findings: Recent liver MRI shows a lesion in the dome, segment 8 abutting the right hepatic vein and lying close to the vena cava.  A second lesion is deep in segment 7.  No other evidence of metastatic disease by my read, but official radiology read is pending.    CT volumetry 11/8/2019 shows a TLV of 2343 ml (2231 ml on 9/20/19 CT volumetry).  The measured FLR is 580 ml (660 ml on 9/20/19 CT volumetry).  Thus, the FLR = 24.7%.    CT volumetry on 9/20/19, using the same calculations above, showed an FLR = 29.6%.    Even with accounting for differences in measurement of both sides, it appears the left liver did not hypertrophy following right PVE.    On most recent CT volumetry, there is demonstration of coils in the right portal vein segments.    Pertinent Exam: Abdomen soft, non-tender, and non-distended.  No jaundice or scleral icterus.  Well healed lower midline laparotomy.     Assessment/Counseling/Plan: 59 M w/ colorectal cancer  liver metastases in segments 8 and 7 with good response to 11 cycles of systemic therapy with CAPEOX-antiEGFR.  His disease appears to be resectable with formal right hepatectomy given the abutment of the right hepatic vein in a proximal location.  I re-discussed the risks, benefits, and alternatives of the surgery.  Risks including, but not limited to death, DVT, PE, PNA, cardiac complications, UTI, injury to adjacent organs, infection, bleeding, bile leak, and hepatic failure were discussed.  Given his chemotherapy, need for major hepatectomy for the best oncologic outcomes, and need for an FLR of greater than 30%, I recommended right PVE given an initial FLR = 29.6% based on CT volumetry.  At the time of the clinic appointment, updated CT volumetry was not available to discuss with the patient.  As such, I originally discussed that I would contact the patient with his updated volumetry given this would effect whether or not we would proceed with surgery this upcoming week.  All questions were answered at that time and the patient was in agreement with and understanding of the plan.      Updated CT volumetry returned later in the day.  Unfortunately, he did not have growth of the left side (FLR = 24.7%) as I would expect given right PVE.  Thus, I am concerned he will not tolerate the planned major hepatectomy.  I plan on contacting the patient prior to surgery to discuss this concern and not proceeding with surgery.  I plan to discuss with IR if repeat embolization should be attempted.  In addition, we can repeat imaging in another 2 weeks to see if any hypertrophy has occurred.  Based on those results, alternative treatments (chemotherapy, embolization, ablation, etc.) to major hepatectomy may need to be pursued in order to minimize morbidity and mortality.     A total of 20 minutes of face to face time was spent on this case, of which, more than half was spent in counseling and coordination of care.

## 2019-11-11 ENCOUNTER — DOCUMENTATION ONLY (OUTPATIENT)
Dept: SURGERY | Facility: CLINIC | Age: 59
End: 2019-11-11

## 2019-11-11 ENCOUNTER — TELEPHONE (OUTPATIENT)
Dept: SURGERY | Facility: CLINIC | Age: 59
End: 2019-11-11

## 2019-11-11 ENCOUNTER — PATIENT OUTREACH (OUTPATIENT)
Dept: SURGERY | Facility: CLINIC | Age: 59
End: 2019-11-11

## 2019-11-11 DIAGNOSIS — C78.7 METASTATIC COLON CANCER TO LIVER (H): Primary | ICD-10-CM

## 2019-11-11 DIAGNOSIS — C18.9 METASTATIC COLON CANCER TO LIVER (H): Primary | ICD-10-CM

## 2019-11-11 NOTE — TELEPHONE ENCOUNTER
I called the patient to discuss that his liver did not grow following right PVE.  As such, I am concerned about a significantly increased risk of complications after major hepatectomy.  We are cancelling his surgery tomorrow for this reason.  The plan will be for me to reach out to interventional radiology to see if the right side can be embolized more completely.  If so, then we could proceed with that.  If not, then I would plan to re-image him with volumetry in 2 weeks to see if he had any growth in the interim.  If we get the growth we need, then we could proceed with right hepatectomy, otherwise, he may only tolerate alternative liver directed therapies.  All questions answered.  The patient was in agreement with the above plan.

## 2019-11-11 NOTE — PROGRESS NOTES
Surgical Oncology RN Care Coordination Note:     Per discussion with Dr. Luong patient to be tentatively scheduled for a follow up scan in 2 weeks for repeat volumes to see if there is any interval growth to proceed with surgery. If plan is to repeat embolization will delay imaging to be done after repeat procedure.     Orders placed for repeat scans and sent to scheduling to arrange scan. [employer] message sent to patient regarding plan.     Lena Farfan, RN, BSN  Care Coordinator   [phone]

## 2019-11-12 ENCOUNTER — ANESTHESIA (OUTPATIENT)
Dept: SURGERY | Facility: CLINIC | Age: 59
End: 2019-11-12

## 2019-11-12 ENCOUNTER — TELEPHONE (OUTPATIENT)
Dept: SURGERY | Facility: CLINIC | Age: 59
End: 2019-11-12

## 2019-11-12 DIAGNOSIS — Z01.818 PRE-OPERATIVE GENERAL PHYSICAL EXAMINATION: Primary | ICD-10-CM

## 2019-11-12 DIAGNOSIS — C18.9 METASTATIC COLON CANCER TO LIVER (H): ICD-10-CM

## 2019-11-12 DIAGNOSIS — C78.7 METASTATIC COLON CANCER TO LIVER (H): ICD-10-CM

## 2019-11-12 NOTE — TELEPHONE ENCOUNTER
I called to let the patient know that he still has some portal flow on the right in looking at the CT scan.  Dr. Machado would like him to have a liver US with doppler to look at portal flow.  If there is a percutaneous access point, then the plan would be for a repeat embolization to close the currently patent vessels on the right.  All questions answered.  The patient was in agreement with and understanding of the plan.

## 2019-11-14 ENCOUNTER — PATIENT OUTREACH (OUTPATIENT)
Dept: SURGERY | Facility: CLINIC | Age: 59
End: 2019-11-14

## 2019-11-14 NOTE — TELEPHONE ENCOUNTER
Surgical Oncology RN Care Coordination Note:     Returned call to patients daughter regarding questions about the US tomorrow. Also discuss getting patients port flushed. Informed her I will work to see what options we have to get port flushed while here and send her a [employer] message with plan.     Lena Farfan RN, BSN  Care Coordinator   [phone]

## 2019-11-14 NOTE — TELEPHONE ENCOUNTER
Surgical Oncology RN Care Coordination Note:     Returned call and left a message for patients daughter, left our direct number for her to return called if she still has questions.     Lena Farfan RN, BSN  Care Coordinator   [phone]

## 2019-11-15 ENCOUNTER — ANCILLARY PROCEDURE (OUTPATIENT)
Dept: ULTRASOUND IMAGING | Facility: CLINIC | Age: 59
End: 2019-11-15
Attending: SURGERY
Payer: COMMERCIAL

## 2019-11-15 DIAGNOSIS — C18.9 METASTATIC COLON CANCER TO LIVER (H): ICD-10-CM

## 2019-11-15 DIAGNOSIS — C78.7 METASTATIC COLON CANCER TO LIVER (H): ICD-10-CM

## 2019-11-15 PROCEDURE — 96523 IRRIG DRUG DELIVERY DEVICE: CPT

## 2019-11-15 PROCEDURE — 25000128 H RX IP 250 OP 636: Performed by: SURGERY

## 2019-11-15 RX ORDER — HEPARIN SODIUM (PORCINE) LOCK FLUSH IV SOLN 100 UNIT/ML 100 UNIT/ML
5 SOLUTION INTRAVENOUS ONCE
Status: COMPLETED | OUTPATIENT
Start: 2019-11-15 | End: 2019-11-15

## 2019-11-15 RX ADMIN — HEPARIN 5 ML: 100 SYRINGE at 07:42

## 2019-11-15 NOTE — NURSING NOTE
Chief Complaint   Patient presents with     Port Flush     Port flushed by RN in lab.     Port accessed with 20 gauge gripper needle by RN, line flushed with saline and heparin. Line de-accessed.    Milvia Gutierrez RN

## 2019-11-18 DIAGNOSIS — C78.7 SECONDARY MALIGNANT NEOPLASM OF LIVER (H): Primary | ICD-10-CM

## 2019-11-18 DIAGNOSIS — C19 METASTATIC COLORECTAL CANCER: ICD-10-CM

## 2019-11-19 ENCOUNTER — TELEPHONE (OUTPATIENT)
Dept: SURGERY | Facility: CLINIC | Age: 59
End: 2019-11-19

## 2019-11-19 ENCOUNTER — TELEPHONE (OUTPATIENT)
Dept: VASCULAR SURGERY | Facility: CLINIC | Age: 59
End: 2019-11-19

## 2019-11-19 ENCOUNTER — HOSPITAL ENCOUNTER (OUTPATIENT)
Facility: CLINIC | Age: 59
End: 2019-11-19
Payer: COMMERCIAL

## 2019-11-19 DIAGNOSIS — C78.7 METASTATIC COLON CANCER TO LIVER (H): Primary | ICD-10-CM

## 2019-11-19 DIAGNOSIS — C18.9 METASTATIC COLON CANCER TO LIVER (H): Primary | ICD-10-CM

## 2019-11-19 NOTE — TELEPHONE ENCOUNTER
I called this AM to discuss that IR was going to attempt repeat portal vein embolization and the patient was in agreement.  However, IR later informed us that they would not be able to reschedule until next week.  As such, by the time we got the patient to the OR, he would have been off systemic therapy for nearly 4 months.  I called the patient a second time to discuss that I was concerned about him being off systemic therapy that long.  As such, I have recommended that we proceed with laparoscopic, possible open ablation, possible partial resection.  Given his smaller sized lesions, I think ablation will have a positive effect on his tumors and may even be curative.  We do not burn any surgical bridges by treating with an ablation in the event of recurrence.  Will go ahead and work on an OR date.  All questions were answered.  The patient was in agreement with and understanding of the plan.

## 2019-11-19 NOTE — TELEPHONE ENCOUNTER
Called pt and informed him that I did get communication from Dr. Luong's team regarding PV embolization again .    Informed him that the soonest appt for retreatment with General anesthesia again is with Dr. Machado on Weds 11/27.     He is to check into 3rd floor PRE OP at 9am for an 11am start time again.     Informed him that this is the soonest appt time and will continually work on an earlier appt time/date. However we do have him scheduled for this date currently.     Pt verbalized understanding.     *will communicate with Ag team.     Carlotta WILDER RN, BSN  Interventional Radiology Nurse Coordinator   Phone: [phone]

## 2019-11-20 ENCOUNTER — TELEPHONE (OUTPATIENT)
Dept: SURGERY | Facility: CLINIC | Age: 59
End: 2019-11-20

## 2019-11-20 NOTE — TELEPHONE ENCOUNTER
I scheduled surgery for this patient with Dr. Luong on 11/27 at [facility]. Scheduled per orders.    Additional appointments:  Post-op MRI @ [facility]: 12/13 at 8:30 AM    Post-op: 12/13 at 10:30 AM     I called the patient and left a voicemail asking to confirm the scheduled dates.     I sent a surgery packet to them via mail and [employer]. This contains education and directions for the surgery.     They are aware that they will receive a call  ~2 days prior to the scheduled procedure and will be given an exact arrival/start time.     Provided my direct callback number and let him know that he can respond on [employer].

## 2019-11-26 ENCOUNTER — ANESTHESIA EVENT (OUTPATIENT)
Dept: SURGERY | Facility: CLINIC | Age: 59
End: 2019-11-26
Payer: COMMERCIAL

## 2019-11-26 RX ORDER — NALOXONE HYDROCHLORIDE 0.4 MG/ML
.1-.4 INJECTION, SOLUTION INTRAMUSCULAR; INTRAVENOUS; SUBCUTANEOUS
Status: CANCELLED | OUTPATIENT
Start: 2019-11-26 | End: 2019-11-27

## 2019-11-27 ENCOUNTER — ANESTHESIA (OUTPATIENT)
Dept: SURGERY | Facility: CLINIC | Age: 59
End: 2019-11-27
Payer: COMMERCIAL

## 2019-11-27 ENCOUNTER — HOSPITAL ENCOUNTER (OUTPATIENT)
Facility: CLINIC | Age: 59
Discharge: HOME | End: 2019-11-27
Attending: SURGERY | Admitting: SURGERY
Payer: COMMERCIAL

## 2019-11-27 ENCOUNTER — TELEPHONE (OUTPATIENT)
Dept: RADIOLOGY | Facility: CLINIC | Age: 59
End: 2019-11-27

## 2019-11-27 VITALS
SYSTOLIC BLOOD PRESSURE: 147 MMHG | RESPIRATION RATE: 16 BRPM | TEMPERATURE: 97.7 F | BODY MASS INDEX: 30.27 KG/M2 | HEIGHT: 74 IN | WEIGHT: 235.89 LBS | DIASTOLIC BLOOD PRESSURE: 94 MMHG | HEART RATE: 62 BPM | OXYGEN SATURATION: 97 %

## 2019-11-27 DIAGNOSIS — C18.9 METASTATIC COLON CANCER TO LIVER (H): ICD-10-CM

## 2019-11-27 DIAGNOSIS — G89.18 POST-OPERATIVE PAIN: Primary | ICD-10-CM

## 2019-11-27 DIAGNOSIS — C78.7 METASTATIC COLON CANCER TO LIVER (H): ICD-10-CM

## 2019-11-27 DIAGNOSIS — C78.7 COLON CANCER METASTASIZED TO LIVER (H): Primary | ICD-10-CM

## 2019-11-27 DIAGNOSIS — C18.9 COLON CANCER METASTASIZED TO LIVER (H): Primary | ICD-10-CM

## 2019-11-27 LAB — GLUCOSE BLDC GLUCOMTR-MCNC: 82 MG/DL (ref 70–99)

## 2019-11-27 PROCEDURE — 25000128 H RX IP 250 OP 636: Performed by: ANESTHESIOLOGY

## 2019-11-27 PROCEDURE — 25000125 ZZHC RX 250: Performed by: NURSE ANESTHETIST, CERTIFIED REGISTERED

## 2019-11-27 PROCEDURE — 37000008 ZZH ANESTHESIA TECHNICAL FEE, 1ST 30 MIN: Performed by: SURGERY

## 2019-11-27 PROCEDURE — 71000015 ZZH RECOVERY PHASE 1 LEVEL 2 EA ADDTL HR: Performed by: SURGERY

## 2019-11-27 PROCEDURE — 37000009 ZZH ANESTHESIA TECHNICAL FEE, EACH ADDTL 15 MIN: Performed by: SURGERY

## 2019-11-27 PROCEDURE — 88341 IMHCHEM/IMCYTCHM EA ADD ANTB: CPT | Performed by: SURGERY

## 2019-11-27 PROCEDURE — 25000128 H RX IP 250 OP 636: Performed by: NURSE ANESTHETIST, CERTIFIED REGISTERED

## 2019-11-27 PROCEDURE — 88307 TISSUE EXAM BY PATHOLOGIST: CPT | Performed by: SURGERY

## 2019-11-27 PROCEDURE — 25800030 ZZH RX IP 258 OP 636: Performed by: NURSE ANESTHETIST, CERTIFIED REGISTERED

## 2019-11-27 PROCEDURE — 88313 SPECIAL STAINS GROUP 2: CPT | Performed by: SURGERY

## 2019-11-27 PROCEDURE — 36000057 ZZH SURGERY LEVEL 3 1ST 30 MIN - UMMC: Performed by: SURGERY

## 2019-11-27 PROCEDURE — 40000171 ZZH STATISTIC PRE-PROCEDURE ASSESSMENT III: Performed by: SURGERY

## 2019-11-27 PROCEDURE — 71000027 ZZH RECOVERY PHASE 2 EACH 15 MINS: Performed by: SURGERY

## 2019-11-27 PROCEDURE — 25000132 ZZH RX MED GY IP 250 OP 250 PS 637: Performed by: ANESTHESIOLOGY

## 2019-11-27 PROCEDURE — 88342 IMHCHEM/IMCYTCHM 1ST ANTB: CPT | Performed by: SURGERY

## 2019-11-27 PROCEDURE — 36000059 ZZH SURGERY LEVEL 3 EA 15 ADDTL MIN UMMC: Performed by: SURGERY

## 2019-11-27 PROCEDURE — 25000128 H RX IP 250 OP 636: Performed by: STUDENT IN AN ORGANIZED HEALTH CARE EDUCATION/TRAINING PROGRAM

## 2019-11-27 PROCEDURE — 71000014 ZZH RECOVERY PHASE 1 LEVEL 2 FIRST HR: Performed by: SURGERY

## 2019-11-27 PROCEDURE — 82962 GLUCOSE BLOOD TEST: CPT

## 2019-11-27 PROCEDURE — C9290 INJ, BUPIVACAINE LIPOSOME: HCPCS | Performed by: STUDENT IN AN ORGANIZED HEALTH CARE EDUCATION/TRAINING PROGRAM

## 2019-11-27 PROCEDURE — 27210794 ZZH OR GENERAL SUPPLY STERILE: Performed by: SURGERY

## 2019-11-27 PROCEDURE — 25000566 ZZH SEVOFLURANE, EA 15 MIN: Performed by: SURGERY

## 2019-11-27 RX ORDER — SODIUM CHLORIDE, SODIUM LACTATE, POTASSIUM CHLORIDE, CALCIUM CHLORIDE 600; 310; 30; 20 MG/100ML; MG/100ML; MG/100ML; MG/100ML
INJECTION, SOLUTION INTRAVENOUS CONTINUOUS PRN
Status: DISCONTINUED | OUTPATIENT
Start: 2019-11-27 | End: 2019-11-27

## 2019-11-27 RX ORDER — HEPARIN SODIUM,PORCINE 10 UNIT/ML
5-10 VIAL (ML) INTRAVENOUS
Status: DISCONTINUED | OUTPATIENT
Start: 2019-11-27 | End: 2019-11-27 | Stop reason: HOSPADM

## 2019-11-27 RX ORDER — METOPROLOL TARTRATE 1 MG/ML
1-2 INJECTION, SOLUTION INTRAVENOUS EVERY 5 MIN PRN
Status: DISCONTINUED | OUTPATIENT
Start: 2019-11-27 | End: 2019-11-27 | Stop reason: HOSPADM

## 2019-11-27 RX ORDER — OXYCODONE HYDROCHLORIDE 5 MG/1
5 TABLET ORAL ONCE
Status: COMPLETED | OUTPATIENT
Start: 2019-11-27 | End: 2019-11-27

## 2019-11-27 RX ORDER — HEPARIN SODIUM,PORCINE 10 UNIT/ML
5-10 VIAL (ML) INTRAVENOUS EVERY 24 HOURS
Status: DISCONTINUED | OUTPATIENT
Start: 2019-11-27 | End: 2019-11-27 | Stop reason: HOSPADM

## 2019-11-27 RX ORDER — DEXAMETHASONE SODIUM PHOSPHATE 4 MG/ML
INJECTION, SOLUTION INTRA-ARTICULAR; INTRALESIONAL; INTRAMUSCULAR; INTRAVENOUS; SOFT TISSUE PRN
Status: DISCONTINUED | OUTPATIENT
Start: 2019-11-27 | End: 2019-11-27

## 2019-11-27 RX ORDER — ONDANSETRON 4 MG/1
4 TABLET, ORALLY DISINTEGRATING ORAL EVERY 30 MIN PRN
Status: DISCONTINUED | OUTPATIENT
Start: 2019-11-27 | End: 2019-11-27 | Stop reason: HOSPADM

## 2019-11-27 RX ORDER — NALOXONE HYDROCHLORIDE 0.4 MG/ML
.1-.4 INJECTION, SOLUTION INTRAMUSCULAR; INTRAVENOUS; SUBCUTANEOUS
Status: DISCONTINUED | OUTPATIENT
Start: 2019-11-27 | End: 2019-11-27 | Stop reason: HOSPADM

## 2019-11-27 RX ORDER — HEPARIN SODIUM (PORCINE) LOCK FLUSH IV SOLN 100 UNIT/ML 100 UNIT/ML
5 SOLUTION INTRAVENOUS
Status: DISCONTINUED | OUTPATIENT
Start: 2019-11-27 | End: 2019-11-27 | Stop reason: HOSPADM

## 2019-11-27 RX ORDER — OXYCODONE HYDROCHLORIDE 5 MG/1
5-10 TABLET ORAL EVERY 4 HOURS PRN
Qty: 20 TABLET | Refills: 0 | Status: SHIPPED | OUTPATIENT
Start: 2019-11-27 | End: 2020-01-17

## 2019-11-27 RX ORDER — CEFAZOLIN SODIUM 2 G/100ML
INJECTION, SOLUTION INTRAVENOUS PRN
Status: DISCONTINUED | OUTPATIENT
Start: 2019-11-27 | End: 2019-11-27

## 2019-11-27 RX ORDER — FENTANYL CITRATE 50 UG/ML
25-50 INJECTION, SOLUTION INTRAMUSCULAR; INTRAVENOUS
Status: DISCONTINUED | OUTPATIENT
Start: 2019-11-27 | End: 2019-11-27 | Stop reason: HOSPADM

## 2019-11-27 RX ORDER — HYDRALAZINE HYDROCHLORIDE 20 MG/ML
2.5-5 INJECTION INTRAMUSCULAR; INTRAVENOUS EVERY 10 MIN PRN
Status: DISCONTINUED | OUTPATIENT
Start: 2019-11-27 | End: 2019-11-27 | Stop reason: HOSPADM

## 2019-11-27 RX ORDER — FLUMAZENIL 0.1 MG/ML
0.2 INJECTION, SOLUTION INTRAVENOUS
Status: DISCONTINUED | OUTPATIENT
Start: 2019-11-27 | End: 2019-11-27 | Stop reason: HOSPADM

## 2019-11-27 RX ORDER — FENTANYL CITRATE 50 UG/ML
INJECTION, SOLUTION INTRAMUSCULAR; INTRAVENOUS PRN
Status: DISCONTINUED | OUTPATIENT
Start: 2019-11-27 | End: 2019-11-27

## 2019-11-27 RX ORDER — BUPIVACAINE HYDROCHLORIDE 2.5 MG/ML
INJECTION, SOLUTION EPIDURAL; INFILTRATION; INTRACAUDAL PRN
Status: DISCONTINUED | OUTPATIENT
Start: 2019-11-27 | End: 2019-11-27

## 2019-11-27 RX ORDER — ONDANSETRON 2 MG/ML
INJECTION INTRAMUSCULAR; INTRAVENOUS PRN
Status: DISCONTINUED | OUTPATIENT
Start: 2019-11-27 | End: 2019-11-27

## 2019-11-27 RX ORDER — PROPOFOL 10 MG/ML
INJECTION, EMULSION INTRAVENOUS PRN
Status: DISCONTINUED | OUTPATIENT
Start: 2019-11-27 | End: 2019-11-27

## 2019-11-27 RX ORDER — ALBUTEROL SULFATE 0.83 MG/ML
2.5 SOLUTION RESPIRATORY (INHALATION) EVERY 4 HOURS PRN
Status: DISCONTINUED | OUTPATIENT
Start: 2019-11-27 | End: 2019-11-27 | Stop reason: HOSPADM

## 2019-11-27 RX ORDER — EPHEDRINE SULFATE 50 MG/ML
INJECTION, SOLUTION INTRAMUSCULAR; INTRAVENOUS; SUBCUTANEOUS PRN
Status: DISCONTINUED | OUTPATIENT
Start: 2019-11-27 | End: 2019-11-27

## 2019-11-27 RX ORDER — HYDROMORPHONE HYDROCHLORIDE 1 MG/ML
.3-.5 INJECTION, SOLUTION INTRAMUSCULAR; INTRAVENOUS; SUBCUTANEOUS EVERY 5 MIN PRN
Status: DISCONTINUED | OUTPATIENT
Start: 2019-11-27 | End: 2019-11-27 | Stop reason: HOSPADM

## 2019-11-27 RX ORDER — LIDOCAINE 40 MG/G
CREAM TOPICAL
Status: DISCONTINUED | OUTPATIENT
Start: 2019-11-27 | End: 2019-11-27 | Stop reason: HOSPADM

## 2019-11-27 RX ORDER — CEFAZOLIN SODIUM 1 G/3ML
INJECTION, POWDER, FOR SOLUTION INTRAMUSCULAR; INTRAVENOUS PRN
Status: DISCONTINUED | OUTPATIENT
Start: 2019-11-27 | End: 2019-11-27

## 2019-11-27 RX ORDER — LIDOCAINE HYDROCHLORIDE 20 MG/ML
INJECTION, SOLUTION INFILTRATION; PERINEURAL PRN
Status: DISCONTINUED | OUTPATIENT
Start: 2019-11-27 | End: 2019-11-27

## 2019-11-27 RX ORDER — ONDANSETRON 2 MG/ML
4 INJECTION INTRAMUSCULAR; INTRAVENOUS EVERY 30 MIN PRN
Status: DISCONTINUED | OUTPATIENT
Start: 2019-11-27 | End: 2019-11-27 | Stop reason: HOSPADM

## 2019-11-27 RX ORDER — SODIUM CHLORIDE, SODIUM LACTATE, POTASSIUM CHLORIDE, CALCIUM CHLORIDE 600; 310; 30; 20 MG/100ML; MG/100ML; MG/100ML; MG/100ML
INJECTION, SOLUTION INTRAVENOUS CONTINUOUS
Status: DISCONTINUED | OUTPATIENT
Start: 2019-11-27 | End: 2019-11-27 | Stop reason: HOSPADM

## 2019-11-27 RX ADMIN — ROCURONIUM BROMIDE 20 MG: 10 INJECTION INTRAVENOUS at 08:52

## 2019-11-27 RX ADMIN — FENTANYL CITRATE 50 MCG: 50 INJECTION, SOLUTION INTRAMUSCULAR; INTRAVENOUS at 08:37

## 2019-11-27 RX ADMIN — BUPIVACAINE HYDROCHLORIDE 40 ML: 2.5 INJECTION, SOLUTION EPIDURAL; INFILTRATION; INTRACAUDAL; PERINEURAL at 07:09

## 2019-11-27 RX ADMIN — FENTANYL CITRATE 25 MCG: 50 INJECTION, SOLUTION INTRAMUSCULAR; INTRAVENOUS at 10:51

## 2019-11-27 RX ADMIN — DEXAMETHASONE SODIUM PHOSPHATE 6 MG: 4 INJECTION, SOLUTION INTRA-ARTICULAR; INTRALESIONAL; INTRAMUSCULAR; INTRAVENOUS; SOFT TISSUE at 07:59

## 2019-11-27 RX ADMIN — FENTANYL CITRATE 100 MCG: 50 INJECTION, SOLUTION INTRAMUSCULAR; INTRAVENOUS at 07:34

## 2019-11-27 RX ADMIN — BUPIVACAINE 20 ML: 13.3 INJECTION, SUSPENSION, LIPOSOMAL INFILTRATION at 07:09

## 2019-11-27 RX ADMIN — ONDANSETRON 4 MG: 2 INJECTION INTRAMUSCULAR; INTRAVENOUS at 09:57

## 2019-11-27 RX ADMIN — CEFAZOLIN 1 G: 1 INJECTION, POWDER, FOR SOLUTION INTRAMUSCULAR; INTRAVENOUS at 09:55

## 2019-11-27 RX ADMIN — FENTANYL CITRATE 50 MCG: 50 INJECTION, SOLUTION INTRAMUSCULAR; INTRAVENOUS at 10:01

## 2019-11-27 RX ADMIN — SODIUM CHLORIDE, POTASSIUM CHLORIDE, SODIUM LACTATE AND CALCIUM CHLORIDE: 600; 310; 30; 20 INJECTION, SOLUTION INTRAVENOUS at 07:29

## 2019-11-27 RX ADMIN — PROPOFOL 30 MG: 10 INJECTION, EMULSION INTRAVENOUS at 10:01

## 2019-11-27 RX ADMIN — HYDROMORPHONE HYDROCHLORIDE 0.5 MG: 1 INJECTION, SOLUTION INTRAMUSCULAR; INTRAVENOUS; SUBCUTANEOUS at 10:04

## 2019-11-27 RX ADMIN — ROCURONIUM BROMIDE 10 MG: 10 INJECTION INTRAVENOUS at 09:15

## 2019-11-27 RX ADMIN — FENTANYL CITRATE 25 MCG: 50 INJECTION, SOLUTION INTRAMUSCULAR; INTRAVENOUS at 10:36

## 2019-11-27 RX ADMIN — Medication 5 MG: at 07:59

## 2019-11-27 RX ADMIN — ROCURONIUM BROMIDE 10 MG: 10 INJECTION INTRAVENOUS at 09:27

## 2019-11-27 RX ADMIN — ROCURONIUM BROMIDE 70 MG: 10 INJECTION INTRAVENOUS at 07:40

## 2019-11-27 RX ADMIN — FENTANYL CITRATE 50 MCG: 50 INJECTION INTRAMUSCULAR; INTRAVENOUS at 06:41

## 2019-11-27 RX ADMIN — LIDOCAINE HYDROCHLORIDE 100 MG: 20 INJECTION, SOLUTION INFILTRATION; PERINEURAL at 07:39

## 2019-11-27 RX ADMIN — MIDAZOLAM 1 MG: 1 INJECTION INTRAMUSCULAR; INTRAVENOUS at 06:41

## 2019-11-27 RX ADMIN — HEPARIN SODIUM (PORCINE) LOCK FLUSH IV SOLN 100 UNIT/ML 5 ML: 100 SOLUTION at 13:11

## 2019-11-27 RX ADMIN — CEFAZOLIN SODIUM 2 G: 2 INJECTION, SOLUTION INTRAVENOUS at 07:55

## 2019-11-27 RX ADMIN — OXYCODONE HYDROCHLORIDE 5 MG: 5 TABLET ORAL at 11:17

## 2019-11-27 RX ADMIN — SUGAMMADEX 200 MG: 100 INJECTION, SOLUTION INTRAVENOUS at 09:59

## 2019-11-27 RX ADMIN — FENTANYL CITRATE 50 MCG: 50 INJECTION, SOLUTION INTRAMUSCULAR; INTRAVENOUS at 08:58

## 2019-11-27 ASSESSMENT — MIFFLIN-ST. JEOR: SCORE: 1960

## 2019-11-27 ASSESSMENT — LIFESTYLE VARIABLES: TOBACCO_USE: 1

## 2019-11-27 NOTE — ANESTHESIA PROCEDURE NOTES
Peripheral Nerve Block Procedure Note    Staff:     Anesthesiologist:  Lele Palm MD    Resident/CRNA:  Radha Grant MD    Block performed by resident/CRNA in the presence of a teaching physician    Location: Pre-op  Procedure Start/Stop TImes:     patient identified, IV checked, site marked, risks and benefits discussed, informed consent, monitors and equipment checked, pre-op evaluation, at physician/surgeon's request and post-op pain management      Correct Patient: Yes      Correct Position: Yes      Correct Site: Yes      Correct Procedure: Yes      Correct Laterality:  Yes    Site Marked:  Yes  Procedure details:     Procedure:  TAP    Laterality:  Bilateral    Position:  Supine    Sterile Prep: chloraprep, mask and sterile gloves      Needle:  Insulated    Needle gauge:  21    Needle length (inches):  4    Ultrasound: Yes      Ultrasound used to identify targeted nerve, plexus, or vascular structure and placed a needle adjacent to it      Permanent Image entered into patiient's record      Abnormal pain on injection: No      Blood Aspirated: No      Paresthesias:  No    Bleeding at site: No      Test dose negative for signs of intravascular injection: Yes      Bolus via:  Needle    Infusion Method:  Single Shot    Blood aspirated via catheter: No      Complications:  None  Assessment/Narrative:     Injection made incrementally with aspirations every (mL):  5

## 2019-11-27 NOTE — ANESTHESIA POSTPROCEDURE EVALUATION
Anesthesia POST Procedure Evaluation    Patient: Ishan Arroyo   MRN:     [id number] Gender:   male   Age:    59 year old :      1960        Preoperative Diagnosis: Metastatic colon cancer to liver (H) [C18.9, C78.7]   Procedure(s):  Diagnostic laparoscopy, intraoperative laparoscopic ultrasound, lysis of adhesions   Postop Comments: No value filed.       Anesthesia Type:  Not documented  General    Reportable Event: NO     PAIN: Uncomplicated   Sign Out status: Comfortable, Well controlled pain     PONV: No PONV   Sign Out status:  No Nausea or Vomiting     Neuro/Psych: Uneventful perioperative course   Sign Out Status: Preoperative baseline; Age appropriate mentation     Airway/Resp.: Uneventful perioperative course   Sign Out Status: Non labored breathing, age appropriate RR; Resp. Status within EXPECTED Parameters     CV: Uneventful perioperative course   Sign Out status: Appropriate BP and perfusion indices; Appropriate HR/Rhythm     Disposition:   Sign Out in:  PACU  Disposition:  Phase II; Home  Recovery Course: Uneventful  Follow-Up: Not required           Last Anesthesia Record Vitals:  CRNA VITALS  2019 0942 - 2019 1042      2019             EKG:  Sinus rhythm          Last PACU Vitals:  Vitals Value Taken Time   /82 2019 11:30 AM   Temp     Pulse 61 2019 11:30 AM   Resp 12 2019 11:15 AM   SpO2 98 % 2019 11:38 AM   Temp src     NIBP     Pulse     SpO2     Resp     Temp     Ht Rate     Temp 2     Vitals shown include unvalidated device data.      Electronically Signed By: Reba Vallejo MD, 2019, 11:38 AM

## 2019-11-27 NOTE — OR NURSING
1018 patient arrived to PACU via cart with CRNA and OR Rn , nurse hand off report completed , patient is drowsy but  alert x4 follows command , VSS , incisions ( 5 sites )  are well approximated no drainage present , abdomen soft  , will continue to monitor . RICK

## 2019-11-27 NOTE — BRIEF OP NOTE
[facility] Brief Operative Note    Pre-operative diagnosis: Metastatic colon cancer to liver (H) [C18.9, C78.7]   Post-operative diagnosis Same   Procedure: Procedure(s):  Diagnostic laparoscopy, intraoperative laparoscopic ultrasound, lysis of adhesions   Surgeon: Lele Luong MD   Assistants(s): Darrion Knowles MD   Estimated blood loss: Minimal    Specimens: Random right lobe liver biopsy x 2   Findings: Right lobe liver metastases not treatable by ablation

## 2019-11-27 NOTE — OR NURSING
1243 patient transferred to phase 2 via cart with NST , nurse to nurse hand off report completed , report given to Nilda ABBASI Rn , family called and made aware. MF

## 2019-11-27 NOTE — ANESTHESIA CARE TRANSFER NOTE
Patient: Ishan Arroyo    Procedure(s):  Diagnostic laparoscopy, intraoperative laparoscopic ultrasound, lysis of adhesions    Diagnosis: Metastatic colon cancer to liver (H) [C18.9, C78.7]  Diagnosis Additional Information: No value filed.    Anesthesia Type:   General     Note:  Airway :Nasal Cannula  Patient transferred to:PACU  Comments: To PACU, awake, VSS, patent airway, report to RN.Handoff Report: Identifed the Patient, Identified the Reponsible Provider, Reviewed the pertinent medical history, Discussed the surgical course, Reviewed Intra-OP anesthesia mangement and issues during anesthesia, Set expectations for post-procedure period and Allowed opportunity for questions and acknowledgement of understanding      Vitals: (Last set prior to Anesthesia Care Transfer)    CRNA VITALS  11/27/2019 0942 - 11/27/2019 1019      11/27/2019             EKG:  Sinus rhythm                Electronically Signed By: STEVE Griggs CRNA  November 27, 2019  10:19 AM

## 2019-11-27 NOTE — OP NOTE
Patient: Ishan Arroyo  MRN: [id number]  Date of Surgery: 11/27/2019     Pre-Op Diagnosis: Colorectal Cancer Liver Metastases  Post-Op Diagnosis: Colorectal Cancer Liver Metastases     Title of Operation:  1. Diagnostic Laparoscopy  2. Laparoscopic Lysis of Adhesions  3. Laparoscopic Intra-Operative Liver Ultrasound  4. Ultrasound Guided Liver Biopsy x 2     Anesthesia Type: General Endotracheal  Attending Physician: Lele Luong MD  Assistant: Darrion Knowles MD (Second attending was needed as no qualified resident was available to assist.)     Indications: 59 M w/ rectosigmoid cancer s/p resection with synchronous liver metastases now s/p 11 cycles of chemotherapy including oxaliplatin with good therapy response.  Two lesions, 1 in the right hepatic dome, segment 8 abutting the right hepatic vein and encroaching close to the vena cava. The second is deep in segment 7.  Liver cysts stable.  Last dose of CAPEOX-antiEGFR was 8/30/19.  I saw the patient in clinic 9/13/2019 and liver volumetry subsequently demonstrated a marginal FLR (segments 1-4) of 29-30%, such that I recommended the patient undergo right PVE to generate a FLR > 30% given prolonged pre-operative chemotherapy.  PVE was performed with microspheres and coils 10/10/2019.  Unfortunately, the post-embolization FLR (segments 1-4) was 25-26%.  The patient was seen to have some patent segmental portal venous branches on the right that were still open on CT and US, but given risk that re-embolization may fail to improve the FLR and subsequently further delay the timing from his last chemotherapy and treatment of his metastatic disease, the decision was made to take the patient for diagnostic laparoscopy, assessment of disease burden, assessment of liver health, and the potential ability to perform microwave ablation of the lesions.  The patient presented for that surgery today.     Findings: Diagnostic laparoscopy showed extensive intra-abdominal  adhesions that required moderate lysis to expose the right liver.  There was no evidence of metastatic disease on the peritoneal surface or on the surface of Genny's capsule.  The liver had a steatotic appearance either from his pre-operative chemotherapy or potentially underlying fatty liver.  We did not see obvious visual evidence of fibrotic changes or biliary stasis.  The left lobe of the liver appeared the same.  The gallbladder appeared normal.  Intra-operative liver ultrasound showed essentially normal portal venous and hepatic venous anatomy and flow.  There was a large cleft in the liver in segment 4 that made ultrasound somewhat difficult.  There were multiple cysts noted within the liver parenchyma.  In addition, we were able to locate the coils/plugs from the PVE.  The liver parenchyma on the right had a nodular, hyperechoic architecture that was thought to be related to changes from the PVE.  There was a poorly defined lesion adjacent to the right hepatic vein in segment 8 and close to the vena cava.  This correlated with the known metastatic lesion in segment 8 on CT and MRI.  It was difficult to measure the exact size given irregular and poorly defined margins.  There was abutment of the hepatic vein, but no deformity to suggest invasion.  There was a second lesion in segment 7 adjacent to the distal right hepatic vein and a hepatic cyst that correlated with a known metastatic lesion in segment 7 on CT and MRI.  This was also irregular and difficult to measure given irregular and poorly defined margins in addition to hyperechoic portions of the lesion that made it difficult to distinguish from the surrounding liver parenchyma.  Given the lesions were not small and were extremely poorly defined with regards to margins, we did not feel that ablation could be performed and adequately/accurately treat the lesions.  We then performed 2 random right liver biopsies into areas where the parenchyma was  hyperechoic to rule out fibrotic changes or infiltrative tumor.     Description of Procedure: After appropriate informed consent was obtained, the patient was brought to the operating room where a timeout was performed to identify the correct patient, procedure, and site.  Ancef was administered for perioperative antibiotic prophylaxis.  SCDs were placed for DVT prophylaxis.  The patient was also given 5000 units subcutaneous heparin in the pre-operative holding area for DVT prophylaxis.  The patient was placed in suppine position with arms out to the sides and was then placed under general endotracheal anesthesia.  A Wood catheter was placed.  An OG tube was placed.  The patient was prepped and draped in a sterile fashion.  We began with diagnostic laparoscopy.  A Veres needle with placed in the left upper quadrant just below the rib cage.  Insufflation was established and the abdomen entered in the right abdomen adjacent to the umbilicus and above the lower midline laparotomy with a 5 mm OptiView port.  Given adhesions in this area, we placed a second 5 mm port on the midline in the supraumbilical position and were then able to enter the abdomen in a location with fewer adhesions and more visibility.  We could see a portion of the right liver and the right lateral abdominal wall.  A right lateral subcostal 12 mm port was then placed to facilitate laparoscopic liver ultrasound and lysis of adhesions.  Diagnostic laparoscopy showed extensive intra-abdominal adhesions that required moderate lysis over the dome to expose the right liver.  This was done with hook cautery and Harmonic.  There was no evidence of metastatic disease on the peritoneal surface or on the surface of Genny's capsule.  The liver had a steatotic appearance either from his pre-operative chemotherapy or potentially underlying fatty liver.  We did not see obvious visual evidence of fibrotic changes or biliary stasis.  The left lobe of the liver  appeared the same.  The gallbladder appeared normal.  Intra-operative liver ultrasound showed essentially normal portal venous and hepatic venous anatomy and flow.  There was a large cleft in the liver in segment 4 that made ultrasound somewhat difficult.  There were multiple cysts noted within the liver parenchyma.  In addition, we were able to locate the coils/plugs from the PVE.  The liver parenchyma on the right had a nodular, hyperechoic architecture that was thought to be related to changes from the PVE.  There was a poorly defined lesion adjacent to the right hepatic vein in segment 8 and close to the vena cava.  This correlated with the known metastatic lesion in segment 8 on CT and MRI.  It was difficult to measure the exact size given irregular and poorly defined margins.  There was abutment of the hepatic vein, but no deformity to suggest invasion.  There was a second lesion in segment 7 adjacent to the distal right hepatic vein and a hepatic cyst that correlated with a known metastatic lesion in segment 7 on CT and MRI.  This was also irregular and difficult to measure given irregular and poorly defined margins in addition to hyperechoic portions of the lesion that made it difficult to distinguish from the surrounding liver parenchyma.  Given the lesions were not small and were extremely poorly defined with regards to margins, we did not feel that ablation could be performed and adequately/accurately treat the lesions.  We then performed 2 random right liver biopsies into areas where the parenchyma was hyperechoic to rule out fibrotic changes or infiltrative tumor.  At this point, we terminated the procedure.  The right lateral subcostal port fascia was closed with an 0 Vicryl endoclose stitch.  The abdomen was de-sufflated.  The skin was then closed with 4-0 monacryl and skin glue.  All instrument, sponge, and needle counts were correct at the end of the case x 2.  The patient was then extubated and  taken to the PACU in stable condition.  He tolerated the procedure well and I discussed the case with the family in the waiting room.     Estimated Blood Loss: Minimal  Urine Output: See anesthesia record  Fluids: See anesthesia record  Drains: None  Specimens: Random right liver biopsies x 2.     Disposition: PACU --> Discharge Home     Post-Op Plan Discussed with Family: I discussed with the family that the 2 lesions in the right lobe of the liver were not treatable by ablation given the inability to clearly define the borders of the tumors to provide for adequate ablation margins.  The plan at this point will be to attempt embolization of the open PV segments in the right lobe in order to hopefully facilitate some hypertrophy of the left lobe of the liver.  During the time of hopeful growth, I plan to send the patient to medical oncology to discuss undergoing another month or 2 of chemotherapy to prevent disease progression while we wait to see if his liver hypertrophies.  Will also discuss with IR other methods to attempt to induce hypertrophy.

## 2019-11-27 NOTE — DISCHARGE INSTRUCTIONS
[facility]  Same-Day Surgery   Adult Discharge Orders & Instructions     For 24 hours after surgery    1. Get plenty of rest.  A responsible adult must stay with you for at least 24 hours after you leave the hospital.   2. Do not drive or use heavy equipment.  If you have weakness or tingling, don't drive or use heavy equipment until this feeling goes away.  3. Do not drink alcohol.  4. Avoid strenuous or risky activities.  Ask for help when climbing stairs.   5. You may feel lightheaded.  IF so, sit for a few minutes before standing.  Have someone help you get up.   6. If you have nausea (feel sick to your stomach): Drink only clear liquids such as apple juice, ginger ale, broth or 7-Up.  Rest may also help.  Be sure to drink enough fluids.  Move to a regular diet as you feel able.  7. You may have a slight fever. Call the doctor if your fever is over 100 F (37.7 C) (taken under the tongue) or lasts longer than 24 hours.  8. You may have a dry mouth, a sore throat, muscle aches or trouble sleeping.  These should go away after 24 hours.  9. Do not make important or legal decisions.   Call your doctor for any of the followin.  Signs of infection (fever, growing tenderness at the surgery site, a large amount of drainage or bleeding, severe pain, foul-smelling drainage, redness, swelling).    2. It has been over 8 to 10 hours since surgery and you are still not able to urinate (pass water).    3.  Headache for over 24 hours.    4.  Numbness, tingling or weakness the day after surgery (if you had spinal anesthesia).  To contact a doctor, call __Dr. Luong @[phone]____ or:    X   [phone] and ask for the resident on call for   _____Oncology Clinic____ (answered 24 hours a day)  X   Emergency Department:    [facility]: [phone]    Laparoscopic Discharge Instructions     The surgical procedure you have just had should cause you very little distress or change  in your daily activities.     There are a few things you may notice after your laparoscopic surgery:     1. You may experience mild discomfort around your shoulders.  This is expected and will not last long.  You may take aspirin if you wish for the discomfort.     2. You may notice some bruised appearing area around your umbilicus.  This is normal.     3. You may go about your normal activity and may bath or shower tomorrow.     4. Avoid extreme heavy lifting for two (2) weeks.     5. You may eat your regular diet.     6. You can expect to have some vaginal flow for the next few days.     7. You may have a slight sore throat from anesthetic equipment.  This will leave in a day or two.     Problems are extremely rare following this surgery.  If you experience any of the following, please call your doctor:     1. Chills or temperature above 100.4 .     2. Sharp abdominal pain.     3. Difficulty urinating.     4. Drainage from or infected appearing incision

## 2019-11-27 NOTE — ANESTHESIA PREPROCEDURE EVALUATION
Anesthesia Pre-Procedure Evaluation    Patient: Ishan Arroyo   MRN:     [id number] Gender:   male   Age:    59 year old :      1960        Preoperative Diagnosis: Metastatic colon cancer to liver (H) [C18.9, C78.7]   Procedure(s):  Diagnostic laparoscopy, possible liver resection, possible open  laparoscopic ultrasound guided microwave ablation of liver tumor, intraoperative ultrasound, possible liver resection  possible open     History reviewed. No pertinent past medical history.   Past Surgical History:   Procedure Laterality Date     IR VISCERAL EMBOLIZATION  10/10/2019          Anesthesia Evaluation     . Pt has had prior anesthetic. Type: General and MAC    History of anesthetic complications   - motion sickness        ROS/MED HX    ENT/Pulmonary:     (+)MATHEUS risk factors hypertension, tobacco use, Past use , . .    Neurologic:     (+)neuropathy - chemo related,     Cardiovascular:     (+) Dyslipidemia, hypertension----. : . . . :. . Previous cardiac testing Echodate:results:date: results:ECG reviewed date: results: date: results:          METS/Exercise Tolerance:  4 - Raking leaves, gardening   Hematologic:     (+) Anemia, History of Transfusion -      Musculoskeletal:  - neg musculoskeletal ROS       GI/Hepatic:     (+) liver disease,       Renal/Genitourinary:     (+) Nephrolithiasis ,       Endo:         Psychiatric:  - neg psychiatric ROS       Infectious Disease:  - neg infectious disease ROS       Malignancy:   (+) Malignancy History of GI and Other  GI CA status post Surgery and Chemo, Other CA embolization status post         Other:    (+) No chance of pregnancy no H/O Chronic Pain,no other significant disability                        PHYSICAL EXAM:   Mental Status/Neuro: A/A/O   Airway: Facies: Feasible  Mallampati: II  Mouth/Opening: Full  TM distance: > 6 cm  Neck ROM: Limited   Respiratory: Auscultation: CTAB     Resp. Rate: Normal     Resp. Effort: Normal      CV: Rhythm:  "Regular  Heart: Normal Sounds  Edema: None   Comments:      Dental: Normal Dentition                LABS:  CBC:   Lab Results   Component Value Date    WBC 6.3 10/10/2019    WBC 6.3 10/08/2019    HGB 13.6 10/10/2019    HGB 14.3 10/08/2019    HCT 42.0 10/10/2019    HCT 42.1 10/08/2019     10/10/2019     10/08/2019     BMP:   Lab Results   Component Value Date     10/10/2019     10/08/2019    POTASSIUM 3.8 10/10/2019    POTASSIUM 4.1 10/08/2019    CHLORIDE 106 10/10/2019    CHLORIDE 107 10/08/2019    CO2 27 10/10/2019    CO2 27 10/08/2019    BUN 13 10/10/2019    BUN 10 10/08/2019    CR 0.59 (L) 10/10/2019    CR 0.59 (L) 10/08/2019    GLC 84 10/10/2019    GLC 91 10/08/2019     COAGS:   Lab Results   Component Value Date    PTT 35 10/10/2019    INR 1.14 10/10/2019     POC:   Lab Results   Component Value Date    BGM 82 11/27/2019     OTHER:   Lab Results   Component Value Date    IRENE 8.6 10/10/2019    ALBUMIN 3.6 10/10/2019    PROTTOTAL 7.3 10/10/2019    ALT 26 10/10/2019    AST 28 10/10/2019    ALKPHOS 91 10/10/2019    BILITOTAL 0.8 10/10/2019        Preop Vitals    BP Readings from Last 3 Encounters:   11/27/19 122/83   11/08/19 (!) 147/83   11/08/19 (!) 148/89    Pulse Readings from Last 3 Encounters:   11/27/19 59   11/08/19 75   11/08/19 75      Resp Readings from Last 3 Encounters:   11/27/19 16   11/08/19 19   11/08/19 16    SpO2 Readings from Last 3 Encounters:   11/27/19 98%   11/08/19 97%   11/08/19 97%      Temp Readings from Last 1 Encounters:   11/27/19 36.5  C (97.7  F) (Oral)    Ht Readings from Last 1 Encounters:   11/27/19 1.88 m (6' 2.02\")      Wt Readings from Last 1 Encounters:   11/27/19 107 kg (235 lb 14.3 oz)    Estimated body mass index is 30.27 kg/m  as calculated from the following:    Height as of this encounter: 1.88 m (6' 2.02\").    Weight as of this encounter: 107 kg (235 lb 14.3 oz).     LDA:  Port A Cath Single 01/01/19 Right Chest wall (Active)   Access Date " 11/27/19 11/27/2019  6:30 AM   Access Attempts 1 11/27/2019  6:30 AM   Gauge/Length 20 gauge;3/4 inch 11/27/2019  6:30 AM   Site Assessment WDL 11/27/2019  6:30 AM   Line Status Blood return noted 11/27/2019  6:30 AM   Extravasation? No 11/27/2019  6:30 AM   Dressing Intervention Transparent 11/27/2019  6:30 AM   Number of days: 330        Assessment:   ASA SCORE: 3    H&P: History and physical reviewed and following examination; no interval change.   Smoking Status:  Non-Smoker/Unknown   NPO Status: NPO Appropriate     Plan:   Anes. Type:  General   Pre-Medication: None   Induction:  IV (Standard)   Airway: ETT; Oral   Access/Monitoring: PIV   Maintenance: Balanced     Postop Plan:   Postop Pain: Opioids  Postop Sedation/Airway: Not planned     PONV Management:   Adult Risk Factors:, Non-Smoker, Postop Opioids   Prevention: Ondansetron, Dexamethasone     CONSENT: Direct conversation   Plan and risks discussed with: Patient                      Reba Vallejo MD

## 2019-12-02 ENCOUNTER — TELEPHONE (OUTPATIENT)
Dept: VASCULAR SURGERY | Facility: CLINIC | Age: 59
End: 2019-12-02

## 2019-12-02 ENCOUNTER — ANESTHESIA EVENT (OUTPATIENT)
Dept: SURGERY | Facility: CLINIC | Age: 59
End: 2019-12-02
Payer: COMMERCIAL

## 2019-12-02 ENCOUNTER — PATIENT OUTREACH (OUTPATIENT)
Dept: SURGERY | Facility: CLINIC | Age: 59
End: 2019-12-02

## 2019-12-02 RX ORDER — AMPICILLIN AND SULBACTAM 2; 1 G/1; G/1
3 INJECTION, POWDER, FOR SOLUTION INTRAMUSCULAR; INTRAVENOUS EVERY 6 HOURS
Status: CANCELLED | OUTPATIENT
Start: 2019-12-02

## 2019-12-02 ASSESSMENT — LIFESTYLE VARIABLES: TOBACCO_USE: 1

## 2019-12-02 NOTE — ANESTHESIA PREPROCEDURE EVALUATION
Anesthesia Pre-Procedure Evaluation    Patient: Ishan Arroyo   MRN:     [id number] Gender:   male   Age:    59 year old :      1960        Preoperative Diagnosis: Colon cancer metastasized to liver (H) [C18.9, C78.7]   Procedure(s):  ANESTHESIA OUT OF OR BLAND EMBOLIZATION @1130     No past medical history on file.   Past Surgical History:   Procedure Laterality Date     IR VISCERAL EMBOLIZATION  10/10/2019     LAPAROSCOPY DIAGNOSTIC (GENERAL) N/A 2019    Procedure: Diagnostic laparoscopy, intraoperative laparoscopic ultrasound, lysis of adhesions;  Surgeon: Lele Luong MD;  Location: [facility] OR          Anesthesia Evaluation     . Pt has had prior anesthetic. Type: General    No history of anesthetic complications   - motion sickness  at OSH, GrIII view with Miller2, but other anesthetics have had straightfoward airways.      ROS/MED HX    ENT/Pulmonary:     (+)MATHEUS risk factors hypertension, tobacco use, , . .    Neurologic:  - neg neurologic ROS     Cardiovascular:     (+) Dyslipidemia, hypertension----. : . . . :. .       METS/Exercise Tolerance:  >4 METS   Hematologic:     (+) Anemia, -      Musculoskeletal:  - neg musculoskeletal ROS       GI/Hepatic:     (+) liver disease, Other GI/Hepatic Colon cancer metastasized to liver      Renal/Genitourinary:     (+) chronic renal disease, Nephrolithiasis ,       Endo:  - neg endo ROS       Psychiatric:  - neg psychiatric ROS       Infectious Disease:  - neg infectious disease ROS       Malignancy:   (+) Malignancy History of GI  GI CA Active status post, Colon cancer metastasized to liver        Other:    (+) C-spine cleared: N/A,                        PHYSICAL EXAM:   Mental Status/Neuro: A/A/O   Airway: Facies: Feasible  Mallampati: I  Mouth/Opening: Full  TM distance: > 6 cm  Neck ROM: Full   Respiratory: Auscultation: CTAB     Resp. Rate: Normal     Resp. Effort: Normal      CV: Rhythm: Regular  Rate: Age appropriate  Heart: Normal Sounds  Edema:  "None   Comments:      Dental: Normal Dentition                LABS:  CBC:   Lab Results   Component Value Date    WBC 6.3 10/10/2019    WBC 6.3 10/08/2019    HGB 13.6 10/10/2019    HGB 14.3 10/08/2019    HCT 42.0 10/10/2019    HCT 42.1 10/08/2019     10/10/2019     10/08/2019     BMP:   Lab Results   Component Value Date     10/10/2019     10/08/2019    POTASSIUM 3.8 10/10/2019    POTASSIUM 4.1 10/08/2019    CHLORIDE 106 10/10/2019    CHLORIDE 107 10/08/2019    CO2 27 10/10/2019    CO2 27 10/08/2019    BUN 13 10/10/2019    BUN 10 10/08/2019    CR 0.59 (L) 10/10/2019    CR 0.59 (L) 10/08/2019    GLC 84 10/10/2019    GLC 91 10/08/2019     COAGS:   Lab Results   Component Value Date    PTT 35 10/10/2019    INR 1.14 10/10/2019     POC:   Lab Results   Component Value Date    BGM 82 11/27/2019     OTHER:   Lab Results   Component Value Date    IRENE 8.6 10/10/2019    ALBUMIN 3.6 10/10/2019    PROTTOTAL 7.3 10/10/2019    ALT 26 10/10/2019    AST 28 10/10/2019    ALKPHOS 91 10/10/2019    BILITOTAL 0.8 10/10/2019        Preop Vitals    BP Readings from Last 3 Encounters:   11/27/19 (!) 147/94   11/08/19 (!) 147/83   11/08/19 (!) 148/89    Pulse Readings from Last 3 Encounters:   11/27/19 62   11/08/19 75   11/08/19 75      Resp Readings from Last 3 Encounters:   11/27/19 16   11/08/19 19   11/08/19 16    SpO2 Readings from Last 3 Encounters:   11/27/19 97%   11/08/19 97%   11/08/19 97%      Temp Readings from Last 1 Encounters:   11/27/19 36.5  C (97.7  F)    Ht Readings from Last 1 Encounters:   11/27/19 1.88 m (6' 2.02\")      Wt Readings from Last 1 Encounters:   11/27/19 107 kg (235 lb 14.3 oz)    Estimated body mass index is 30.27 kg/m  as calculated from the following:    Height as of 11/27/19: 1.88 m (6' 2.02\").    Weight as of 11/27/19: 107 kg (235 lb 14.3 oz).     LDA:  Port A Cath Single 01/01/19 Right Chest wall (Active)   Number of days: 335       Urethral Catheter Latex;Temperature " probe;Straight-tip;Double-lumen 16 fr (Active)   Number of days: 5        Assessment:   ASA SCORE: 3    H&P: History and physical reviewed and following examination; no interval change.    NPO Status: NPO Appropriate     Plan:   Anes. Type:  General   Pre-Medication: None   Induction:  IV (Standard)   Airway: ETT; Oral; CMAC/VL (Had Gr3 view previoulsy, required VL with GrI view (but had Gr1 view with Smalls since))   Access/Monitoring: PIV; 2nd PIV   Maintenance: Balanced     Postop Plan:   Postop Pain: Opioids  Postop Sedation/Airway: Not planned     PONV Management:   Adult Risk Factors:, Postop Opioids   Prevention: Ondansetron, Dexamethasone     CONSENT: Direct conversation   Plan and risks discussed with: Patient          Comments for Plan/Consent:  ______________________________________________________________________  I discussed the risks and benefits of general anesthesia with the patient.  Questions were sought and answered.      Shantanu Tapia MD  Attending Anesthesiologist                   Radha Grant MD

## 2019-12-02 NOTE — TELEPHONE ENCOUNTER
Surgical Oncology RN Care Coordination Note:     Called and left a message for nurse navigator at the patients oncology clinic. Informed her in the message of request for patient to get back with oncology and restarted on chemo for a few more weeks. Informed her that we are getting him embolized today and that we would not plan to reimaging for at least 4 weeks so we would like him to restart chemo now that he has been off for over 2 months. Informed her that we would request that he get at least 2 but possible 3 depending on what the oncology provider felt would be most appropriate for him while we wait for results of embolization before re-imaging to assess liver growth.     Left my direct number for RN to return call and discuss patient care.     Lena Farfan RN, BSN  Care Coordinator   [phone]

## 2019-12-02 NOTE — TELEPHONE ENCOUNTER
Surgical Oncology RN Care Coordination Note:     Called and spoke with oncology clinic RN navigator Karla regarding plan for patient to get back on chemotherapy. Informed her that we would plan to see back after a few cycles with new imaging and to repeat the plan for surgical resection. She agreed with plan and will follow up with me after his visit on 12/13/19 with plan on cycles for chemo.     Lena Farfan, RN, BSN  Care Coordinator   [phone]

## 2019-12-02 NOTE — TELEPHONE ENCOUNTER
Called pt and informed him that I did get confirmation regarding his treatment tomorrow.     Informed him that he is to check into 3rd floor preop at 930am for an 1130am start time.     Nothing to eat or drink after midnight  Morning meds o.k with sip of water before 8am     Bring .     Informed him that this procedure will be done by Dr. Robb Antonio.   Explained that Dr. Antonio, Dr. Machado and Dr. Luong are all in communication regarding plan and that we were able to get General Anesthesia on for Tuesday with Dr. Antonio.       Informed him that I'll f/u with Dr. Luong's team and communicate back to them about treatment date as well.     He agrees to plan.   Will call with any other questions.     Carlotta WILDER RN, BSN  Interventional Radiology Nurse Coordinator   Phone: [phone]

## 2019-12-03 ENCOUNTER — PATIENT OUTREACH (OUTPATIENT)
Dept: SURGERY | Facility: CLINIC | Age: 59
End: 2019-12-03

## 2019-12-03 ENCOUNTER — APPOINTMENT (OUTPATIENT)
Dept: INTERVENTIONAL RADIOLOGY/VASCULAR | Facility: CLINIC | Age: 59
End: 2019-12-03
Attending: RADIOLOGY
Payer: COMMERCIAL

## 2019-12-03 ENCOUNTER — ANESTHESIA (OUTPATIENT)
Dept: SURGERY | Facility: CLINIC | Age: 59
End: 2019-12-03
Payer: COMMERCIAL

## 2019-12-03 ENCOUNTER — HOSPITAL ENCOUNTER (OUTPATIENT)
Facility: CLINIC | Age: 59
Discharge: HOME | End: 2019-12-03
Attending: ANESTHESIOLOGY | Admitting: ANESTHESIOLOGY
Payer: COMMERCIAL

## 2019-12-03 VITALS
WEIGHT: 231.26 LBS | HEART RATE: 86 BPM | RESPIRATION RATE: 16 BRPM | TEMPERATURE: 98.4 F | OXYGEN SATURATION: 97 % | SYSTOLIC BLOOD PRESSURE: 135 MMHG | BODY MASS INDEX: 30.65 KG/M2 | DIASTOLIC BLOOD PRESSURE: 88 MMHG | HEIGHT: 73 IN

## 2019-12-03 DIAGNOSIS — C18.9 COLON CANCER METASTASIZED TO LIVER (H): ICD-10-CM

## 2019-12-03 DIAGNOSIS — C78.7 COLON CANCER METASTASIZED TO LIVER (H): ICD-10-CM

## 2019-12-03 LAB
ABO + RH BLD: NORMAL
ABO + RH BLD: NORMAL
ALBUMIN SERPL-MCNC: 3.8 G/DL (ref 3.4–5)
ALP SERPL-CCNC: 98 U/L (ref 40–150)
ALT SERPL W P-5'-P-CCNC: 33 U/L (ref 0–70)
ANION GAP SERPL CALCULATED.3IONS-SCNC: 2 MMOL/L (ref 3–14)
AST SERPL W P-5'-P-CCNC: 25 U/L (ref 0–45)
BILIRUB SERPL-MCNC: 0.6 MG/DL (ref 0.2–1.3)
BLD GP AB SCN SERPL QL: NORMAL
BLOOD BANK CMNT PATIENT-IMP: NORMAL
BLOOD BANK CMNT PATIENT-IMP: NORMAL
BUN SERPL-MCNC: 13 MG/DL (ref 7–30)
CALCIUM SERPL-MCNC: 9.3 MG/DL (ref 8.5–10.1)
CHLORIDE SERPL-SCNC: 104 MMOL/L (ref 94–109)
CO2 SERPL-SCNC: 31 MMOL/L (ref 20–32)
CREAT SERPL-MCNC: 0.72 MG/DL (ref 0.66–1.25)
ERYTHROCYTE [DISTWIDTH] IN BLOOD BY AUTOMATED COUNT: 15.8 % (ref 10–15)
GFR SERPL CREATININE-BSD FRML MDRD: >90 ML/MIN/{1.73_M2}
GLUCOSE BLDC GLUCOMTR-MCNC: 80 MG/DL (ref 70–99)
GLUCOSE SERPL-MCNC: 88 MG/DL (ref 70–99)
HCT VFR BLD AUTO: 47.2 % (ref 40–53)
HGB BLD-MCNC: 15.4 G/DL (ref 13.3–17.7)
INR PPP: 1.05 (ref 0.86–1.14)
MCH RBC QN AUTO: 29.2 PG (ref 26.5–33)
MCHC RBC AUTO-ENTMCNC: 32.6 G/DL (ref 31.5–36.5)
MCV RBC AUTO: 89 FL (ref 78–100)
PLATELET # BLD AUTO: 246 10E9/L (ref 150–450)
POTASSIUM SERPL-SCNC: 4.7 MMOL/L (ref 3.4–5.3)
PROT SERPL-MCNC: 8 G/DL (ref 6.8–8.8)
RBC # BLD AUTO: 5.28 10E12/L (ref 4.4–5.9)
SODIUM SERPL-SCNC: 138 MMOL/L (ref 133–144)
SPECIMEN EXP DATE BLD: NORMAL
WBC # BLD AUTO: 7.2 10E9/L (ref 4–11)

## 2019-12-03 PROCEDURE — 25000125 ZZHC RX 250: Performed by: NURSE ANESTHETIST, CERTIFIED REGISTERED

## 2019-12-03 PROCEDURE — C1887 CATHETER, GUIDING: HCPCS

## 2019-12-03 PROCEDURE — 25000128 H RX IP 250 OP 636: Performed by: NURSE ANESTHETIST, CERTIFIED REGISTERED

## 2019-12-03 PROCEDURE — 71000015 ZZH RECOVERY PHASE 1 LEVEL 2 EA ADDTL HR

## 2019-12-03 PROCEDURE — 37000008 ZZH ANESTHESIA TECHNICAL FEE, 1ST 30 MIN

## 2019-12-03 PROCEDURE — 27210907 ZZH KIT CR9

## 2019-12-03 PROCEDURE — C1769 GUIDE WIRE: HCPCS

## 2019-12-03 PROCEDURE — 86900 BLOOD TYPING SEROLOGIC ABO: CPT | Performed by: RADIOLOGY

## 2019-12-03 PROCEDURE — 25000565 ZZH ISOFLURANE, EA 15 MIN

## 2019-12-03 PROCEDURE — 85610 PROTHROMBIN TIME: CPT | Performed by: RADIOLOGY

## 2019-12-03 PROCEDURE — 27210888 ZZH ACCESSORY CR7

## 2019-12-03 PROCEDURE — 00000146 ZZHCL STATISTIC GLUCOSE BY METER IP

## 2019-12-03 PROCEDURE — 85027 COMPLETE CBC AUTOMATED: CPT | Performed by: RADIOLOGY

## 2019-12-03 PROCEDURE — 86850 RBC ANTIBODY SCREEN: CPT | Performed by: RADIOLOGY

## 2019-12-03 PROCEDURE — 27810275 ZZH COIL/EMBOLIC DEVICE CR9

## 2019-12-03 PROCEDURE — 37000009 ZZH ANESTHESIA TECHNICAL FEE, EACH ADDTL 15 MIN

## 2019-12-03 PROCEDURE — 25500064 ZZH RX 255 OP 636: Performed by: RADIOLOGY

## 2019-12-03 PROCEDURE — 75887 VEIN X-RAY LIVER W/O HEMODYN: CPT

## 2019-12-03 PROCEDURE — 27210765 ZZH GLUE EMBOLI CR34

## 2019-12-03 PROCEDURE — 40000170 ZZH STATISTIC PRE-PROCEDURE ASSESSMENT II

## 2019-12-03 PROCEDURE — 36415 COLL VENOUS BLD VENIPUNCTURE: CPT | Performed by: RADIOLOGY

## 2019-12-03 PROCEDURE — 71000014 ZZH RECOVERY PHASE 1 LEVEL 2 FIRST HR

## 2019-12-03 PROCEDURE — 86901 BLOOD TYPING SEROLOGIC RH(D): CPT | Performed by: RADIOLOGY

## 2019-12-03 PROCEDURE — 25000128 H RX IP 250 OP 636: Performed by: RADIOLOGY

## 2019-12-03 PROCEDURE — 80053 COMPREHEN METABOLIC PANEL: CPT | Performed by: RADIOLOGY

## 2019-12-03 PROCEDURE — 25800030 ZZH RX IP 258 OP 636: Performed by: ANESTHESIOLOGY

## 2019-12-03 PROCEDURE — 27210805 ZZH SHEATH CR4

## 2019-12-03 PROCEDURE — 37243 VASC EMBOLIZE/OCCLUDE ORGAN: CPT

## 2019-12-03 PROCEDURE — 27210732 ZZH ACCESSORY CR1

## 2019-12-03 PROCEDURE — 27210889 ZZH ACCESSORY CR8

## 2019-12-03 PROCEDURE — 71000027 ZZH RECOVERY PHASE 2 EACH 15 MINS

## 2019-12-03 PROCEDURE — 36011 PLACE CATHETER IN VEIN: CPT

## 2019-12-03 PROCEDURE — 27210912 ZZH NEEDLE CR8

## 2019-12-03 RX ORDER — PROPOFOL 10 MG/ML
INJECTION, EMULSION INTRAVENOUS PRN
Status: DISCONTINUED | OUTPATIENT
Start: 2019-12-03 | End: 2019-12-03

## 2019-12-03 RX ORDER — NALOXONE HYDROCHLORIDE 0.4 MG/ML
.1-.4 INJECTION, SOLUTION INTRAMUSCULAR; INTRAVENOUS; SUBCUTANEOUS
Status: DISCONTINUED | OUTPATIENT
Start: 2019-12-03 | End: 2019-12-03 | Stop reason: HOSPADM

## 2019-12-03 RX ORDER — SODIUM CHLORIDE, SODIUM LACTATE, POTASSIUM CHLORIDE, CALCIUM CHLORIDE 600; 310; 30; 20 MG/100ML; MG/100ML; MG/100ML; MG/100ML
INJECTION, SOLUTION INTRAVENOUS CONTINUOUS
Status: DISCONTINUED | OUTPATIENT
Start: 2019-12-03 | End: 2019-12-03 | Stop reason: HOSPADM

## 2019-12-03 RX ORDER — FENTANYL CITRATE 50 UG/ML
25-50 INJECTION, SOLUTION INTRAMUSCULAR; INTRAVENOUS
Status: DISCONTINUED | OUTPATIENT
Start: 2019-12-03 | End: 2019-12-03 | Stop reason: HOSPADM

## 2019-12-03 RX ORDER — ONDANSETRON 4 MG/1
4 TABLET, ORALLY DISINTEGRATING ORAL EVERY 30 MIN PRN
Status: DISCONTINUED | OUTPATIENT
Start: 2019-12-03 | End: 2019-12-03 | Stop reason: HOSPADM

## 2019-12-03 RX ORDER — DEXAMETHASONE SODIUM PHOSPHATE 4 MG/ML
INJECTION, SOLUTION INTRA-ARTICULAR; INTRALESIONAL; INTRAMUSCULAR; INTRAVENOUS; SOFT TISSUE PRN
Status: DISCONTINUED | OUTPATIENT
Start: 2019-12-03 | End: 2019-12-03

## 2019-12-03 RX ORDER — DEXTROSE MONOHYDRATE 25 G/50ML
25-50 INJECTION, SOLUTION INTRAVENOUS
Status: DISCONTINUED | OUTPATIENT
Start: 2019-12-03 | End: 2019-12-03 | Stop reason: HOSPADM

## 2019-12-03 RX ORDER — ONDANSETRON 2 MG/ML
INJECTION INTRAMUSCULAR; INTRAVENOUS PRN
Status: DISCONTINUED | OUTPATIENT
Start: 2019-12-03 | End: 2019-12-03

## 2019-12-03 RX ORDER — LIDOCAINE 40 MG/G
CREAM TOPICAL
Status: DISCONTINUED | OUTPATIENT
Start: 2019-12-03 | End: 2019-12-03 | Stop reason: HOSPADM

## 2019-12-03 RX ORDER — ALBUTEROL SULFATE 0.83 MG/ML
2.5 SOLUTION RESPIRATORY (INHALATION) EVERY 4 HOURS PRN
Status: DISCONTINUED | OUTPATIENT
Start: 2019-12-03 | End: 2019-12-03 | Stop reason: HOSPADM

## 2019-12-03 RX ORDER — NICOTINE POLACRILEX 4 MG
15-30 LOZENGE BUCCAL
Status: DISCONTINUED | OUTPATIENT
Start: 2019-12-03 | End: 2019-12-03 | Stop reason: HOSPADM

## 2019-12-03 RX ORDER — ACETAMINOPHEN 325 MG/1
975 TABLET ORAL ONCE
Status: DISCONTINUED | OUTPATIENT
Start: 2019-12-03 | End: 2019-12-03 | Stop reason: HOSPADM

## 2019-12-03 RX ORDER — FENTANYL CITRATE 50 UG/ML
INJECTION, SOLUTION INTRAMUSCULAR; INTRAVENOUS PRN
Status: DISCONTINUED | OUTPATIENT
Start: 2019-12-03 | End: 2019-12-03

## 2019-12-03 RX ORDER — OXYCODONE HCL 5 MG/5 ML
5 SOLUTION, ORAL ORAL EVERY 4 HOURS PRN
Status: DISCONTINUED | OUTPATIENT
Start: 2019-12-03 | End: 2019-12-03 | Stop reason: HOSPADM

## 2019-12-03 RX ORDER — MEPERIDINE HYDROCHLORIDE 50 MG/ML
12.5 INJECTION INTRAMUSCULAR; INTRAVENOUS; SUBCUTANEOUS
Status: DISCONTINUED | OUTPATIENT
Start: 2019-12-03 | End: 2019-12-03 | Stop reason: HOSPADM

## 2019-12-03 RX ORDER — HYDROMORPHONE HYDROCHLORIDE 1 MG/ML
.3-.5 INJECTION, SOLUTION INTRAMUSCULAR; INTRAVENOUS; SUBCUTANEOUS EVERY 10 MIN PRN
Status: DISCONTINUED | OUTPATIENT
Start: 2019-12-03 | End: 2019-12-03 | Stop reason: HOSPADM

## 2019-12-03 RX ORDER — IODIXANOL 320 MG/ML
150 INJECTION, SOLUTION INTRAVASCULAR ONCE
Status: COMPLETED | OUTPATIENT
Start: 2019-12-03 | End: 2019-12-03

## 2019-12-03 RX ORDER — AMPICILLIN AND SULBACTAM 2; 1 G/1; G/1
3 INJECTION, POWDER, FOR SOLUTION INTRAMUSCULAR; INTRAVENOUS EVERY 6 HOURS
Status: DISCONTINUED | OUTPATIENT
Start: 2019-12-03 | End: 2019-12-03 | Stop reason: HOSPADM

## 2019-12-03 RX ORDER — ONDANSETRON 2 MG/ML
4 INJECTION INTRAMUSCULAR; INTRAVENOUS EVERY 30 MIN PRN
Status: DISCONTINUED | OUTPATIENT
Start: 2019-12-03 | End: 2019-12-03 | Stop reason: HOSPADM

## 2019-12-03 RX ORDER — LIDOCAINE HYDROCHLORIDE 20 MG/ML
INJECTION, SOLUTION INFILTRATION; PERINEURAL PRN
Status: DISCONTINUED | OUTPATIENT
Start: 2019-12-03 | End: 2019-12-03

## 2019-12-03 RX ADMIN — SUGAMMADEX 200 MG: 100 INJECTION, SOLUTION INTRAVENOUS at 16:37

## 2019-12-03 RX ADMIN — ONDANSETRON 4 MG: 2 INJECTION INTRAMUSCULAR; INTRAVENOUS at 15:56

## 2019-12-03 RX ADMIN — ROCURONIUM BROMIDE 20 MG: 10 INJECTION INTRAVENOUS at 14:10

## 2019-12-03 RX ADMIN — AMPICILLIN SODIUM AND SULBACTAM SODIUM 3 G: 2; 1 INJECTION, POWDER, FOR SOLUTION INTRAMUSCULAR; INTRAVENOUS at 13:10

## 2019-12-03 RX ADMIN — FENTANYL CITRATE 50 MCG: 50 INJECTION, SOLUTION INTRAMUSCULAR; INTRAVENOUS at 14:10

## 2019-12-03 RX ADMIN — SODIUM CHLORIDE, POTASSIUM CHLORIDE, SODIUM LACTATE AND CALCIUM CHLORIDE: 600; 310; 30; 20 INJECTION, SOLUTION INTRAVENOUS at 15:10

## 2019-12-03 RX ADMIN — ROCURONIUM BROMIDE 60 MG: 10 INJECTION INTRAVENOUS at 12:50

## 2019-12-03 RX ADMIN — PROPOFOL 150 MG: 10 INJECTION, EMULSION INTRAVENOUS at 12:49

## 2019-12-03 RX ADMIN — ROCURONIUM BROMIDE 10 MG: 10 INJECTION INTRAVENOUS at 14:30

## 2019-12-03 RX ADMIN — PROPOFOL 50 MG: 10 INJECTION, EMULSION INTRAVENOUS at 12:57

## 2019-12-03 RX ADMIN — ROCURONIUM BROMIDE 10 MG: 10 INJECTION INTRAVENOUS at 16:13

## 2019-12-03 RX ADMIN — ROCURONIUM BROMIDE 10 MG: 10 INJECTION INTRAVENOUS at 15:34

## 2019-12-03 RX ADMIN — ROCURONIUM BROMIDE 10 MG: 10 INJECTION INTRAVENOUS at 15:00

## 2019-12-03 RX ADMIN — LIDOCAINE HYDROCHLORIDE 100 MG: 20 INJECTION, SOLUTION INFILTRATION; PERINEURAL at 12:49

## 2019-12-03 RX ADMIN — FENTANYL CITRATE 50 MCG: 50 INJECTION, SOLUTION INTRAMUSCULAR; INTRAVENOUS at 14:54

## 2019-12-03 RX ADMIN — MIDAZOLAM 2 MG: 1 INJECTION INTRAMUSCULAR; INTRAVENOUS at 12:35

## 2019-12-03 RX ADMIN — DEXAMETHASONE SODIUM PHOSPHATE 10 MG: 4 INJECTION, SOLUTION INTRA-ARTICULAR; INTRALESIONAL; INTRAMUSCULAR; INTRAVENOUS; SOFT TISSUE at 13:02

## 2019-12-03 RX ADMIN — FENTANYL CITRATE 50 MCG: 50 INJECTION, SOLUTION INTRAMUSCULAR; INTRAVENOUS at 15:56

## 2019-12-03 RX ADMIN — AMPICILLIN SODIUM AND SULBACTAM SODIUM 1.5 G: 2; 1 INJECTION, POWDER, FOR SOLUTION INTRAMUSCULAR; INTRAVENOUS at 15:15

## 2019-12-03 RX ADMIN — SODIUM CHLORIDE, POTASSIUM CHLORIDE, SODIUM LACTATE AND CALCIUM CHLORIDE: 600; 310; 30; 20 INJECTION, SOLUTION INTRAVENOUS at 12:35

## 2019-12-03 RX ADMIN — FENTANYL CITRATE 100 MCG: 50 INJECTION, SOLUTION INTRAMUSCULAR; INTRAVENOUS at 12:49

## 2019-12-03 RX ADMIN — ROCURONIUM BROMIDE 20 MG: 10 INJECTION INTRAVENOUS at 13:31

## 2019-12-03 RX ADMIN — IODIXANOL 130 ML: 320 INJECTION, SOLUTION INTRAVASCULAR at 16:45

## 2019-12-03 ASSESSMENT — MIFFLIN-ST. JEOR: SCORE: 1922.88

## 2019-12-03 NOTE — ANESTHESIA CARE TRANSFER NOTE
Patient: Ishan Arroyo    Procedure(s):  ANESTHESIA OUT OF OR BLAND EMBOLIZATION @1130    Diagnosis: Colon cancer metastasized to liver (H) [C18.9, C78.7]  Diagnosis Additional Information: No value filed.    Anesthesia Type:   General     Note:  Airway :Face Mask  Patient transferred to:PACU  Comments: Pt alert, breathing spontaneously on 6L O2 via FM. VSS. Report shared with RN.Handoff Report: Identifed the Patient, Identified the Reponsible Provider, Reviewed the pertinent medical history, Discussed the surgical course, Reviewed Intra-OP anesthesia mangement and issues during anesthesia, Set expectations for post-procedure period and Allowed opportunity for questions and acknowledgement of understanding      Vitals: (Last set prior to Anesthesia Care Transfer)    CRNA VITALS  12/3/2019 1617 - 12/3/2019 1656      12/3/2019             Resp Rate (observed):  (!) 2                Electronically Signed By: STEVE Lynn CRNA  December 3, 2019  4:56 PM

## 2019-12-03 NOTE — ANESTHESIA POSTPROCEDURE EVALUATION
Anesthesia POST Procedure Evaluation    Patient: Ishan Arroyo   MRN:     [id number] Gender:   male   Age:    59 year old :      1960        Preoperative Diagnosis: Colon cancer metastasized to liver (H) [C18.9, C78.7]   Procedure(s):  ANESTHESIA OUT OF OR BLAND EMBOLIZATION @1130   Postop Comments: No value filed.       Anesthesia Type:  Not documented  General    Reportable Event: NO     PAIN: Uncomplicated   Sign Out status: Comfortable, Well controlled pain     PONV: No PONV   Sign Out status:  No Nausea or Vomiting     Neuro/Psych: Uneventful perioperative course   Sign Out Status: Preoperative baseline; Age appropriate mentation     Airway/Resp.: Uneventful perioperative course   Sign Out Status: Non labored breathing, age appropriate RR; Resp. Status within EXPECTED Parameters     CV: Uneventful perioperative course   Sign Out status: Appropriate BP and perfusion indices; Appropriate HR/Rhythm     Disposition:   Sign Out in:  PACU  Disposition:  Phase II; Home  Recovery Course: Uneventful  Follow-Up: Not required           Last Anesthesia Record Vitals:  CRNA VITALS  12/3/2019 1617 - 12/3/2019 1717      12/3/2019             Resp Rate (observed):  (!) 2          Last PACU Vitals:  Vitals Value Taken Time   /93 12/3/2019  5:20 PM   Temp 36.9  C (98.4  F) 12/3/2019  5:00 PM   Pulse 85 12/3/2019  5:20 PM   Resp 12 12/3/2019  5:15 PM   SpO2 95 % 12/3/2019  5:21 PM   Temp src     NIBP     Pulse     SpO2     Resp     Temp     Ht Rate     Temp 2     Vitals shown include unvalidated device data.      Electronically Signed By: Tramaine Henley MD, December 3, 2019, 5:22 PM

## 2019-12-03 NOTE — TELEPHONE ENCOUNTER
Surgical Oncology RN Care Coordination Note:     Received call from Dr. Rolle, patients oncology provider regarding plan for patient to get cycle of chemo on 12/9 and 12/30 tentatively. He stated he would then plan for patient to have repeat imaging around 1/16 so patient has a full 6 weeks prior to imaging to re-evaluation for liver growth post embolization that is completed today.     Informed him I would send this info to Dr. Luong and make sure he is agreeable to plan.     If so would plan for the following appointments:     Repeat CT and clinic visit on 1/17 - will do same day to minimize trips for patient.   Tentative OR date of 2/5/2020. Currently 2nd on wait list.       Lena Farfan, RN, BSN  Care Coordinator   [phone]

## 2019-12-03 NOTE — PROGRESS NOTES
Patient Name: Ishan Arroyo  Medical Record Number: [id number]  Today's Date: 12/3/2019    Procedure: Visceral Embolization - bland  Proceduralist: Abdirahman Wellington    Procedure start time: 1330  Puncture time: 1332    D: Pt arrived via cart, accompanied by anesthesia.  Pt positioned supine on the procedure table. 5Fr sheath placed directly into portal vein per provider.  Gave hand-off report to Mickie @ [id number].

## 2019-12-03 NOTE — PROCEDURES
[facility], [place]    Procedure: IR Procedure Note  Date/Time: 12/3/2019 4:44 PM  Performed by: Vasu Vargas MD  Authorized by: Vasu Vargas MD   IR Fellow Physician: Vasu Vargas MD  Other(s) attending procedure: Robb Antonio MD    UNIVERSAL PROTOCOL   Site Marked: Yes  Prior Images Obtained and Reviewed:  Yes  Required items: Required blood products, implants, devices and special equipment available    Patient identity confirmed:  Verbally with patient  Patient was reevaluated immediately before administering moderate or deep sedation or anesthesia  Confirmation Checklist:  Patient's identity using two indicators, relevant allergies, procedure was appropriate and matched the consent or emergent situation and correct equipment/implants were available  Time out: Immediately prior to the procedure a time out was called    Preparation: Patient was prepped and draped in usual sterile fashion      SEDATION    : General anesthesia.    See dictated procedure note for full details.  Findings: - patent right portal vein system with proximal segment 6 coils and segment 8 vascular plug.   - Patent main and left portal veins.    Specimens: none    Complications: None    Plan: - 3 hours bedrest post transhepatic/(attempted) transplenic access  - F/u with Dr. Luong for liver volumetry as planned    PROCEDURE   Patient Tolerance:  Patient tolerated the procedure well with no immediate complications  Describe Procedure: - right portal vein c-NBCA embolization  Length of time physician/provider present for 1:1 monitoring during sedation: 0

## 2019-12-04 NOTE — OR NURSING
Following page sent to dr sanchez:patient Ishan Arroyo needs discharge orders please. [place]efanía [id number]

## 2019-12-04 NOTE — OR NURSING
Discharge instruction reviewed with patient and family. All question answered regarding these instructions.

## 2019-12-04 NOTE — DISCHARGE INSTRUCTIONS
1. No heavy lifting for 2 days (no greater than 10 pounds)    2. Resume usual diet    3. Can shower tomorrow    4. Dressing can come off at time of next shower    5. Pain killers as needed    6. Interventional Radiology will call you and arrange your follow up  Same-Day Surgery   Adult Discharge Orders & Instructions     For 24 hours after surgery:  1. Get plenty of rest.  A responsible adult must stay with you for at least 24 hours after you leave the hospital.   2. Pain medication can slow your reflexes. Do not drive or use heavy equipment.  If you have weakness or tingling, don't drive or use heavy equipment until this feeling goes away.  3. Mixing alcohol and pain medication can cause dizziness and slow your breathing. It can even be fatal. Do not drink alcohol while taking pain medication.  4. Avoid strenuous or risky activities.  Ask for help when climbing stairs.   5. You may feel lightheaded.  If so, sit for a few minutes before standing.  Have someone help you get up.   6. If you have nausea (feel sick to your stomach), drink only clear liquids such as apple juice, ginger ale, broth or 7-Up.  Rest may also help.  Be sure to drink enough fluids.  Move to a regular diet as you feel able. Take pain medications with a small amount of solid food, such as toast or crackers, to avoid nausea.   7. A slight fever is normal. Call the doctor if your fever is over 100 F (37.7 C) (taken under the tongue) or lasts longer than 24 hours.  8. You may have a dry mouth, muscle aches, trouble sleeping or a sore throat.  These symptoms should go away after 24 hours.  9. Do not make important or legal decisions.   Pain Management:      1. Take pain medication (if prescribed) for pain as directed by your physician.        2. WARNING: If the pain medication you have been prescribed contains Tylenol  (acetaminophen), DO NOT take additional doses of Tylenol (acetaminophen).     Call your doctor for any of the followin.  Signs  of infection (fever, growing tenderness at the surgery site, severe pain, a large amount of drainage or bleeding, foul-smelling drainage, redness, swelling).    2.  It has been over 8 to 10 hours since surgery and you are still not able to urinate (pee).    3.  Headache for over 24 hours.      To contact a doctor, call _Interventional Radiology from 8 am to 5 pm @ [phone]. After hours call [phone] and ask for MD on call for Interventional Radiology_ or:      [phone] and ask for the Resident On Call for:          _______________IR__________ (answered 24 hours a day)      Emergency Department:  [facility]: [phone]  [facility]: [phone]               Rev. 10/2014

## 2019-12-06 LAB — COPATH REPORT: NORMAL

## 2019-12-09 ENCOUNTER — TELEPHONE (OUTPATIENT)
Dept: VASCULAR SURGERY | Facility: CLINIC | Age: 59
End: 2019-12-09

## 2019-12-09 DIAGNOSIS — C78.7 SECONDARY MALIGNANT NEOPLASM OF LIVER (H): ICD-10-CM

## 2019-12-09 DIAGNOSIS — C19 METASTATIC COLORECTAL CANCER: Primary | ICD-10-CM

## 2019-12-09 NOTE — TELEPHONE ENCOUNTER
Called pt to f/u on him s/p bland embolization   He is doing well s/p    He denies fever/ N/V or pain.    Asked about swelling in which he states that he has no complaints or concerns.     I informed him that I'm glad to hear this and that we will schedule for him to return in 1 mos time with CT and labs.     I informed him that I did consult with Dr. Machado in which he would like to see pt back and we will schedule his f/u accordingly.    He agrees to plan.     Carlotta WILDER RN, BSN  Interventional Radiology Nurse Coordinator   Phone: [phone]

## 2019-12-16 ENCOUNTER — PATIENT OUTREACH (OUTPATIENT)
Dept: SURGERY | Facility: CLINIC | Age: 59
End: 2019-12-16

## 2019-12-16 DIAGNOSIS — C18.9 COLON CANCER METASTASIZED TO LIVER (H): Primary | ICD-10-CM

## 2019-12-16 DIAGNOSIS — C78.7 COLON CANCER METASTASIZED TO LIVER (H): Primary | ICD-10-CM

## 2019-12-16 NOTE — TELEPHONE ENCOUNTER
Surgical Oncology RN Care Coordination Note:     Called and spoke with patient regarding follow up plans post embolization and chemotherapy. Informed him of the planned time line and that our  will be reaching out to schedule both the CT scan and the clinic visit. He will also need to see our PAC clinic again as the previous visit will be outdated. He verbalized understanding and is aware he will get a call from the scheduling team with information.     Per discussion with oncology RN navigator patient will get last dose of chemo on 12/30/2019. Will plan for patient to come to clinic with repeat CT scan the same day on 1/17/2020 to evaluate liver growth. Tentative OR date will be the week of 2/3/2020.      Lena Farfan RN, BSN  Care Coordinator   [phone]

## 2019-12-17 ENCOUNTER — TELEPHONE (OUTPATIENT)
Dept: SURGERY | Facility: CLINIC | Age: 59
End: 2019-12-17

## 2019-12-17 DIAGNOSIS — C18.9 METASTATIC COLON CANCER TO LIVER (H): Primary | ICD-10-CM

## 2019-12-17 DIAGNOSIS — C78.7 METASTATIC COLON CANCER TO LIVER (H): Primary | ICD-10-CM

## 2019-12-17 NOTE — TELEPHONE ENCOUNTER
I scheduled surgery for this patient with Dr. Luong on 2/5 at [facility].   Scheduled per orders.    Additional appointments:  1/17:   CT at 10:20 AM  Ag at 11 AM  PAC at 1 PM    Post-op: 2/28 at 9 am    I called the patient and was able to confirm the scheduled dates.     I sent a surgery packet to them via US mail and [employer], per their preference. This contains education and directions for the surgery.     They are aware that they will receive a call  ~2 days prior to the scheduled procedure and will be given an exact arrival/start time. They are also aware that more education regarding the surgery will be provided at their consult.

## 2019-12-18 NOTE — TELEPHONE ENCOUNTER
FUTURE VISIT INFORMATION      SURGERY INFORMATION:    Date: 20    Location: [facility] OR    Surgeon:  Darrion Joseph    Anesthesia Type:  General    RECORDS REQUESTED FROM:       [facility]    Pertinent Medical History: hypertension    Most recent EKG+ Tracin19

## 2020-01-15 ASSESSMENT — ENCOUNTER SYMPTOMS
SKIN CHANGES: 0
TREMORS: 0
ABDOMINAL PAIN: 0
DIZZINESS: 0
WEAKNESS: 0
NECK MASS: 0
BOWEL INCONTINENCE: 0
PARALYSIS: 0
DIARRHEA: 1
CONSTIPATION: 1
DISTURBANCES IN COORDINATION: 0
SINUS CONGESTION: 0
NAUSEA: 0
VOMITING: 0
TASTE DISTURBANCE: 0
HEARTBURN: 0
SPEECH CHANGE: 0
MEMORY LOSS: 0
HOARSE VOICE: 0
SINUS PAIN: 0
TINGLING: 0
NUMBNESS: 0
POOR WOUND HEALING: 0
LOSS OF CONSCIOUSNESS: 0
BLOOD IN STOOL: 0
HEADACHES: 0
SEIZURES: 0
SMELL DISTURBANCE: 0
SORE THROAT: 0
RECTAL PAIN: 0
NAIL CHANGES: 0
BLOATING: 0
JAUNDICE: 0
TROUBLE SWALLOWING: 0

## 2020-01-17 ENCOUNTER — PRE VISIT (OUTPATIENT)
Dept: SURGERY | Facility: CLINIC | Age: 60
End: 2020-01-17

## 2020-01-17 ENCOUNTER — ANESTHESIA EVENT (OUTPATIENT)
Dept: SURGERY | Facility: CLINIC | Age: 60
End: 2020-01-17

## 2020-01-17 ENCOUNTER — OFFICE VISIT (OUTPATIENT)
Dept: SURGERY | Facility: CLINIC | Age: 60
End: 2020-01-17
Attending: SURGERY
Payer: COMMERCIAL

## 2020-01-17 ENCOUNTER — ANCILLARY PROCEDURE (OUTPATIENT)
Dept: CT IMAGING | Facility: CLINIC | Age: 60
End: 2020-01-17
Attending: SURGERY
Payer: COMMERCIAL

## 2020-01-17 ENCOUNTER — OFFICE VISIT (OUTPATIENT)
Dept: SURGERY | Facility: CLINIC | Age: 60
End: 2020-01-17
Payer: COMMERCIAL

## 2020-01-17 VITALS
OXYGEN SATURATION: 100 % | HEIGHT: 73 IN | HEART RATE: 66 BPM | WEIGHT: 238.1 LBS | DIASTOLIC BLOOD PRESSURE: 88 MMHG | TEMPERATURE: 98.6 F | BODY MASS INDEX: 31.56 KG/M2 | SYSTOLIC BLOOD PRESSURE: 137 MMHG

## 2020-01-17 VITALS
WEIGHT: 238 LBS | SYSTOLIC BLOOD PRESSURE: 137 MMHG | DIASTOLIC BLOOD PRESSURE: 88 MMHG | TEMPERATURE: 98.7 F | BODY MASS INDEX: 32.23 KG/M2 | OXYGEN SATURATION: 100 % | HEART RATE: 66 BPM | HEIGHT: 72 IN | RESPIRATION RATE: 19 BRPM

## 2020-01-17 DIAGNOSIS — C78.7 COLON CANCER METASTASIZED TO LIVER (H): Primary | ICD-10-CM

## 2020-01-17 DIAGNOSIS — Z01.818 PREOP EXAMINATION: ICD-10-CM

## 2020-01-17 DIAGNOSIS — C18.9 COLON CANCER METASTASIZED TO LIVER (H): Primary | ICD-10-CM

## 2020-01-17 DIAGNOSIS — C18.9 COLON CANCER METASTASIZED TO LIVER (H): ICD-10-CM

## 2020-01-17 DIAGNOSIS — C78.7 COLON CANCER METASTASIZED TO LIVER (H): ICD-10-CM

## 2020-01-17 DIAGNOSIS — C18.9 METASTATIC COLON CANCER TO LIVER (H): ICD-10-CM

## 2020-01-17 DIAGNOSIS — C18.9 METASTATIC COLON CANCER TO LIVER (H): Primary | ICD-10-CM

## 2020-01-17 DIAGNOSIS — C78.7 METASTATIC COLON CANCER TO LIVER (H): ICD-10-CM

## 2020-01-17 DIAGNOSIS — C78.7 METASTATIC COLON CANCER TO LIVER (H): Primary | ICD-10-CM

## 2020-01-17 LAB
ALBUMIN SERPL-MCNC: 3.4 G/DL (ref 3.4–5)
ALP SERPL-CCNC: 136 U/L (ref 40–150)
ALT SERPL W P-5'-P-CCNC: 35 U/L (ref 0–70)
ANION GAP SERPL CALCULATED.3IONS-SCNC: 4 MMOL/L (ref 3–14)
AST SERPL W P-5'-P-CCNC: 33 U/L (ref 0–45)
BILIRUB SERPL-MCNC: 0.6 MG/DL (ref 0.2–1.3)
BUN SERPL-MCNC: 8 MG/DL (ref 7–30)
CALCIUM SERPL-MCNC: 9.1 MG/DL (ref 8.5–10.1)
CHLORIDE SERPL-SCNC: 106 MMOL/L (ref 94–109)
CO2 SERPL-SCNC: 31 MMOL/L (ref 20–32)
CREAT SERPL-MCNC: 0.53 MG/DL (ref 0.66–1.25)
ERYTHROCYTE [DISTWIDTH] IN BLOOD BY AUTOMATED COUNT: 17.4 % (ref 10–15)
GFR SERPL CREATININE-BSD FRML MDRD: >90 ML/MIN/{1.73_M2}
GLUCOSE SERPL-MCNC: 98 MG/DL (ref 70–99)
HCT VFR BLD AUTO: 45.3 % (ref 40–53)
HGB BLD-MCNC: 14.9 G/DL (ref 13.3–17.7)
MCH RBC QN AUTO: 29.1 PG (ref 26.5–33)
MCHC RBC AUTO-ENTMCNC: 32.9 G/DL (ref 31.5–36.5)
MCV RBC AUTO: 89 FL (ref 78–100)
PLATELET # BLD AUTO: 147 10E9/L (ref 150–450)
POTASSIUM SERPL-SCNC: 3.4 MMOL/L (ref 3.4–5.3)
PROT SERPL-MCNC: 7.8 G/DL (ref 6.8–8.8)
RBC # BLD AUTO: 5.12 10E12/L (ref 4.4–5.9)
SODIUM SERPL-SCNC: 141 MMOL/L (ref 133–144)
WBC # BLD AUTO: 6 10E9/L (ref 4–11)

## 2020-01-17 PROCEDURE — G0463 HOSPITAL OUTPT CLINIC VISIT: HCPCS | Mod: ZF

## 2020-01-17 RX ORDER — DEXAMETHASONE 4 MG/1
4 TABLET ORAL 2 TIMES DAILY WITH MEALS
Status: ON HOLD | COMMUNITY
End: 2020-02-11

## 2020-01-17 RX ORDER — IOPAMIDOL 755 MG/ML
135 INJECTION, SOLUTION INTRAVASCULAR ONCE
Status: COMPLETED | OUTPATIENT
Start: 2020-01-17 | End: 2020-01-17

## 2020-01-17 RX ORDER — CAPECITABINE 500 MG/1
TABLET, FILM COATED ORAL 2 TIMES DAILY
Status: ON HOLD | COMMUNITY
End: 2020-02-11

## 2020-01-17 RX ORDER — MINOCYCLINE HYDROCHLORIDE 100 MG/1
100 TABLET ORAL 2 TIMES DAILY
COMMUNITY
End: 2020-02-28

## 2020-01-17 RX ADMIN — IOPAMIDOL 135 ML: 755 INJECTION, SOLUTION INTRAVASCULAR at 10:36

## 2020-01-17 ASSESSMENT — PAIN SCALES - GENERAL
PAINLEVEL: NO PAIN (0)
PAINLEVEL: NO PAIN (0)

## 2020-01-17 ASSESSMENT — MIFFLIN-ST. JEOR
SCORE: 1953.76
SCORE: 1937.56

## 2020-01-17 ASSESSMENT — LIFESTYLE VARIABLES: TOBACCO_USE: 1

## 2020-01-17 NOTE — H&P
Pre-Operative H & P     CC:  Preoperative exam to assess for increased cardiopulmonary risk while undergoing surgery and anesthesia.    Date of Encounter: 1/17/2020  Primary Care Physician:  Goran Lopez  Reason for visit: Metastatic colon cancer to liver (H) [C18.9, C78.7]  HPI  Ishan Arroyo is a 59 year old male who presents for pre-operative H & P in preparation for Diagnostic laparoscopy, open right hepatectomy, IOUS with Sherri Luong and Benson on 2/5/20 at [facility]. History is obtained from the patient and medical records.    Patient with history of rectosigmoid cancer s/p resection with synchronous liver metastases now s/p 11 cycles of chemotherapy including oxaliplatin with good therapy response. He underwent right portal vein embolization with microspheres and coils on 10/10/19 per Dr. Luong's request. He returned to Dr. Luong on 11/8/19 and was counseled for above procedures.      His history is otherwise significant for HLD, HTN, and nephrolithiasis.       Past Medical History  Past Medical History:   Diagnosis Date     Colon cancer metastasized to liver (H) 1/22/2019     Essential hypertension 1/4/2013     High cholesterol 11/7/2019     Iron deficiency anemia 1/11/2019     Nephrolithiasis 6/11/2019       Past Surgical History  Past Surgical History:   Procedure Laterality Date     COLON SURGERY  01/22/2019    colectomy     IR VISCERAL EMBOLIZATION  10/10/2019     IR VISCERAL EMBOLIZATION  12/3/2019     LAPAROSCOPY DIAGNOSTIC (GENERAL) N/A 11/27/2019    Procedure: Diagnostic laparoscopy, intraoperative laparoscopic ultrasound, lysis of adhesions;  Surgeon: Lele Luong MD;  Location: [facility] OR     TONSILLECTOMY       [place] teeth extraction         Hx of Blood transfusions/reactions: Yes, approximately one year ago. No known reactions.     Hx of abnormal bleeding or anti-platelet use: Denies.     Menstrual history: No LMP for male patient.    Steroid  use in the last year: Yes Decadron with chemotherapy.    Personal or FH with difficulty with Anesthesia:  Denies.     Prior to Admission Medications  Current Outpatient Medications   Medication Sig Dispense Refill     capecitabine (XELODA) 500 MG tablet Take by mouth 2 times daily 4 tablets taken 2 times daily.  Pt last dose 1/6/20       dexamethasone (DECADRON) 4 MG tablet Take 4 mg by mouth 2 times daily (with meals) Taken on day 2 and 3 after chemo.  Last dose was 1/2/20       metoprolol succinate ER (TOPROL-XL) 100 MG 24 hr tablet Take 100 mg by mouth At Bedtime   1     minocycline (DYNACIN) 100 MG tablet Take 100 mg by mouth 2 times daily Pt has only been taking 1 time daily 1/17/20       Multiple Vitamins-Minerals (MULTI VITAMIN  /MINERALS) TABS Take 1 tablet by mouth every morning        Polyethylene Glycol 3350 (MIRALAX PO) Take 1 capful by mouth as needed (pt last used approximately 1/3/20)       simvastatin (ZOCOR) 20 MG tablet Take 20 mg by mouth At Bedtime        acetaminophen (TYLENOL) 500 MG tablet Take 500-1,000 mg by mouth every 6 hours as needed for mild pain (Pt last took 11/6/19. Pt last dose 3 weeks ago 1/17/20)        lidocaine-prilocaine (EMLA) 2.5-2.5 % external cream Apply topically as needed (Pt. has not used since August 2019. 11/8/19) Pt has not used in since 12/30/19         Allergies  No Known Allergies    Social History  Social History     Socioeconomic History     Marital status: Single     Spouse name: Not on file     Number of children: Not on file     Years of education: Not on file     Highest education level: Not on file   Occupational History     Not on file   Social Needs     Financial resource strain: Not on file     Food insecurity:     Worry: Not on file     Inability: Not on file     Transportation needs:     Medical: Not on file     Non-medical: Not on file   Tobacco Use     Smoking status: Never Smoker     Smokeless tobacco: Current User     Types: Chew     Tobacco  comment: chews occasionally   Substance and Sexual Activity     Alcohol use: Yes     Comment: occ     Drug use: Not on file     Sexual activity: Not on file   Lifestyle     Physical activity:     Days per week: Not on file     Minutes per session: Not on file     Stress: Not on file   Relationships     Social connections:     Talks on phone: Not on file     Gets together: Not on file     Attends Spiritism service: Not on file     Active member of club or organization: Not on file     Attends meetings of clubs or organizations: Not on file     Relationship status: Not on file     Intimate partner violence:     Fear of current or ex partner: Not on file     Emotionally abused: Not on file     Physically abused: Not on file     Forced sexual activity: Not on file   Other Topics Concern     Not on file   Social History Narrative     Not on file       Family History  Family History   Problem Relation Age of Onset     Coronary Artery Disease Father      Hyperlipidemia Father      Hypertension Father        Review of Systems    ROS/MED HX  The complete review of systems is negative other than noted in the HPI or here.   ENT/Pulmonary:     (+)MATHEUS risk factors hypertension, tobacco use, Past use , . .    Neurologic:  - neg neurologic ROS     Cardiovascular:     (+) Dyslipidemia, hypertension----. : . . . :. . Previous cardiac testing Echodate:1.14.19results:date: results: date: results: date: results:         (-) taking anticoagulants/antiplatelets   METS/Exercise Tolerance:  >4 METS   Hematologic:     (+) Anemia, -      Musculoskeletal:  - neg musculoskeletal ROS       GI/Hepatic:     (+) liver disease, Other GI/Hepatic Colon cancer metastasized to liver      Renal/Genitourinary:     (+) chronic renal disease, Nephrolithiasis ,       Endo:  - neg endo ROS       Psychiatric:  - neg psychiatric ROS       Infectious Disease:  - neg infectious disease ROS       Malignancy:   (+) Malignancy History of GI  GI CA  Active status  "post Surgery and Chemo, Colon cancer metastasized to liver        Other:    (+) No chance of pregnancy C-spine cleared: N/A, no H/O Chronic Pain,no other significant disability            PHYSICAL EXAM:   Mental Status/Neuro: A/A/O; Age Appropriate   Airway: Facies: Feasible  Mallampati: I  Mouth/Opening: Full  TM distance: > 6 cm  Neck ROM: Full   Respiratory: Auscultation: CTAB     Resp. Rate: Normal     Resp. Effort: Normal      CV: Rhythm: Regular  Heart: Normal Sounds  Edema: None   Comments:          Preop Vitals    BP Readings from Last 3 Encounters:   01/17/20 137/88   12/03/19 135/88   11/27/19 (!) 147/94    Pulse Readings from Last 3 Encounters:   01/17/20 66   12/03/19 86   11/27/19 62      Resp Readings from Last 3 Encounters:   12/03/19 16   11/27/19 16   11/08/19 19    SpO2 Readings from Last 3 Encounters:   01/17/20 100%   12/03/19 97%   11/27/19 97%      Temp Readings from Last 1 Encounters:   01/17/20 98.6  F (37  C) (Oral)    Ht Readings from Last 1 Encounters:   01/17/20 1.854 m (6' 0.99\")      Wt Readings from Last 1 Encounters:   01/17/20 108 kg (238 lb 1.6 oz)    Estimated body mass index is 31.42 kg/m  as calculated from the following:    Height as of an earlier encounter on 1/17/20: 1.854 m (6' 0.99\").    Weight as of an earlier encounter on 1/17/20: 108 kg (238 lb 1.6 oz).       Temp: 98.7  F (37.1  C) Temp src: Oral BP: 137/88 Pulse: 66   Resp: 19 SpO2: 100 %         238 lbs 0 oz  6' 0\"   Body mass index is 32.28 kg/m .       Physical Exam  Constitutional: Awake, alert, cooperative, no apparent distress, and appears stated age. Accompanied by daughter.   Eyes: Pupils equal, round and reactive to light, extra ocular muscles intact, sclera clear, conjunctiva normal. Glasses on.  HENT: Normocephalic, oral pharynx with moist mucus membranes, good dentition. No goiter appreciated.   Respiratory: Clear to auscultation bilaterally, no crackles or wheezing. No cough or obvious " dyspnea.  Cardiovascular: Regular rate and rhythm, normal S1 and S2, and no murmur noted.  Carotids +2, no bruits. No edema. Palpable pulses to radial  DP and PT arteries.   GI: Normal bowel sounds, soft, non-distended, non-tender, no masses palpated, no hepatosplenomegaly.  Surgical scars: midline, well healed.   Lymph/Hematologic: No cervical lymphadenopathy and no supraclavicular lymphadenopathy.  Genitourinary: Deferred.   Skin: Warm and dry.  Scattered rash across chest, chronic after chemotherapy and resolves quickly. Right upper chest port.  Musculoskeletal: Full ROM of neck. There is no redness, warmth, or swelling of the joints. Gross motor strength is normal.    Neurologic: Awake, alert, oriented to name, place and time. Cranial nerves II-XII are grossly intact. Gait is normal.   Neuropsychiatric: Calm, cooperative. Normal affect.     Labs: (personally reviewed)  Lab Results   Component Value Date    WBC 6.0 01/17/2020     Lab Results   Component Value Date    RBC 5.12 01/17/2020     Lab Results   Component Value Date    HGB 14.9 01/17/2020     Lab Results   Component Value Date    HCT 45.3 01/17/2020     Lab Results   Component Value Date    MCV 89 01/17/2020     Lab Results   Component Value Date    MCH 29.1 01/17/2020     Lab Results   Component Value Date    MCHC 32.9 01/17/2020     Lab Results   Component Value Date    RDW 17.4 01/17/2020     Lab Results   Component Value Date     01/17/2020     Last Comprehensive Metabolic Panel:  Sodium   Date Value Ref Range Status   01/17/2020 141 133 - 144 mmol/L Final     Potassium   Date Value Ref Range Status   01/17/2020 3.4 3.4 - 5.3 mmol/L Final     Chloride   Date Value Ref Range Status   01/17/2020 106 94 - 109 mmol/L Final     Carbon Dioxide   Date Value Ref Range Status   01/17/2020 31 20 - 32 mmol/L Final     Anion Gap   Date Value Ref Range Status   01/17/2020 4 3 - 14 mmol/L Final     Glucose   Date Value Ref Range Status   01/17/2020 98 70 -  99 mg/dL Final     Urea Nitrogen   Date Value Ref Range Status   01/17/2020 8 7 - 30 mg/dL Final     Creatinine   Date Value Ref Range Status   01/17/2020 0.53 (L) 0.66 - 1.25 mg/dL Final     GFR Estimate   Date Value Ref Range Status   01/17/2020 >90 >60 mL/min/[1.73_m2] Final     Comment:     Non  GFR Calc  Starting 12/18/2018, serum creatinine based estimated GFR (eGFR) will be   calculated using the Chronic Kidney Disease Epidemiology Collaboration   (CKD-EPI) equation.       Calcium   Date Value Ref Range Status   01/17/2020 9.1 8.5 - 10.1 mg/dL Final     Lab Results   Component Value Date    AST 33 01/17/2020     Lab Results   Component Value Date    ALT 35 01/17/2020     No results found for: BILICONJ   Lab Results   Component Value Date    BILITOTAL 0.6 01/17/2020     Lab Results   Component Value Date    ALBUMIN 3.4 01/17/2020     Lab Results   Component Value Date    PROTTOTAL 7.8 01/17/2020      Lab Results   Component Value Date    ALKPHOS 136 01/17/2020     EKG: Not indicated.   Cardiac echo: 1/14/19     Interpretation Summary  A complete two-dimensional transthoracic echocardiogram was performed (2D, M-  mode, Doppler and color flow Doppler). The study was diagnostic quality. The  left ventricle is normal in size.  There is normal left ventricular wall thickness.  Left ventricular systolic function is normal.  Ejection Fraction = >55%.  No regional wall motion abnormalities noted.  There is trace tricuspid regurgitation.  Right ventricular systolic pressure is normal.  The aortic root is normal size.  There is no pericardial effusion.    11/15/19 Abd US  Impression:   1.  The main right portal vein demonstrates no vascular flow. The  smaller branch which seems to arise from the posterior right portal  vein is patent with antegrade flow of 22 cm/s.   2.  Heterogeneous liver, with associated scattered masslike areas as  discussed above. Please refer to CT abdomen pelvis with liver  protocol  performed 8 days ago for better characterization of the liver.    CT abd/pelvis 11/8/19                                                                  IMPRESSION: In this patient with history of metastatic colorectal  cancer:  1. Relatively unchanged ill defined hypoenhancing regions at the dome  of the liver and superior right hepatic lobe without evidence of a  discrete mass or suspicious enhancement, presumably areas of treated  metastasis. No evidence of new metastatic disease in the chest,  abdomen, or pelvis.  2. Postprocedural embolization changes of the right hepatic lobe as  above.  3. Liver volumes listed in PACS. Total volume measures 2343 cc and  total left measures 580 cc. Please refer to PACS for calculated liver  segment volumes.   4. Postsurgical changes of rectosigmoid mass resection without  evidence for recurrent disease. Stable soft tissue/infiltrate changes  in the presacral and perirectal fat.  5. Asymmetric thickening of the left bladder wall unchanged from  7/19/2019 exam and indeterminate. If not already evaluated, this could  further be evaluated with visual inspection.   6. Stable sclerotic foci involving the right lateral third, fifth and  sixth ribs. Findings are unchanged prior exam 04/12/2019.     CT CAP pending   7/30/19 PET  IMPRESSION:  1.  No evidence to suggest recurrent malignancy or metastatic disease  within the visualized lesions within the skull base to the proximal thighs.    2.  No abnormal uptake associated with the ill-defined hypoechoic areas of  the superior aspect of the right lobe liver, which suggests that these are  more likely due to a benign process such is atypical focal fatty  infiltration or vascular variation.    3.  Hepatic cysts.    Imaging and cardiac testing reviewed by this provider      Outside records reviewed from: Care Everywhere    ASSESSMENT and PLAN  Ishan Arroyo is a 59 year old male scheduled to undergo Diagnostic laparoscopy, open  right hepatectomy, IOUS with Sherri Luong and Benson on 2/5/20. He has the following specific operative considerations:   - RCRI : 0.9% risk of major adverse cardiac event.   - Anesthesia considerations:  Refer to PAC assessment in anesthesia records  - VTE risk: 3%  - MATHEUS # of risks 4/8 = Intermediate risk  - Risk of PONV score = 2.  If > 2, anti-emetic intervention recommended.    --Metastatic colon cancer to liver. s/p colon resection 1/22/19 and portal vein embolization on 10/10/19. Above procedures now planned.   --HLD. Simvastatin at HS. HTN. Remote history of irregular HR, denies atrial fib. Metoprolol at HS. No other cardiac history, symptoms or meds. Testing above. Good exercise tolerance.   --Nonsmoker. Chews occasionally. No pulmonary symptoms   --History of anemia. Hgb today 14.9. History of blood transfusion. Type and screen drawn today.   --Right upper chest port.    Arrival time, NPO, shower and medication instructions provided by nursing staff today. Preparing For Your Surgery handout given.        Patient was discussed with Dr Henley.    STEVE Braun CNS  Preoperative Assessment Center  [facility]  Clinic and Surgery Center  Phone: [phone]  Fax: [phone]

## 2020-01-17 NOTE — LETTER
1/17/2020       RE: Ishan Arroyo  [address]  [place] [address]     Dear Colleague,    Thank you for referring your patient, Ishan Arroyo, to the [facility]. Please see a copy of my visit note below.    Reason for Visit: Colorectal Cancer Liver Metastases     Pertinent Oncologic History: 59 M w/ rectosigmoid cancer s/p resection with synchronous liver metastases now s/p 13 cycles of chemotherapy including oxaliplatin with stable hepatic disease.  Two lesions, 1 in the right hepatic dome, segment 8 abutting the right hepatic vein and encroaching close to the vena cava. The second is deep in segment 7.  Liver cysts stable.  Last dose of chemotherapy 12/30/2019.  I originally saw the patient in clinic 9/13/2019 and liver volumetry demonstrated a marginal sFLR, as such, I recommended the patient undergo right PVE to generate a left FLR > 30%.  PVE was performed with microspheres and coils 10/10/2019.   However, he did not have the desired growth from this procedure and still had some patent portal flow.  As such, he was taken for attempted laparoscopic ablation, but the lesions were difficult to define in terms of borders on ultrasound.  As such, no ablation was carried out.  Liver biopsy showed microvascular injury, likely from chemotherapy.  He subsequently underwent re-embolization on 12/3/2019 and received further chemotherapy, last dose on 12/30/2019.  He is healthy and active with good performance status.  Non-smoker.  Minimal alcohol.  No history of hepatitis or cirrhosis.  He takes a beta blocker for HTN and a statin for HLD.     Pertinent Work-Up/Findings: CT volumetry from today is pending.     Pertinent Exam: Abdomen soft, non-tender, and non-distended.  No jaundice or scleral icterus.  Well healed lower midline laparotomy.     Assessment/Counseling/Plan: 59 M w/ colorectal cancer liver metastases in segments 8 and 7 with good response to 13 cycles of systemic therapy  with platinum based chemotherapy.  His disease appears to be resectable with formal right hepatectomy given the abutment of the right hepatic vein in a proximal location.  I re-discussed the risks, benefits, and alternatives of the surgery.  Risks including, but not limited to death, DVT, PE, PNA, cardiac complications, UTI, injury to adjacent organs, infection, bleeding, bile leak, and hepatic failure were discussed.  Given his chemotherapy, need for major hepatectomy for the best oncologic outcomes, and need for an FLR of greater than 30% , recommended right PVE given an initial FLR at or just below 30% based on CT volumetry.   The first embolization did not produce the desired growth, but there was recanalization of a portion of the portal flow, so the patient was re-embolized on 12/3/2019.  He also received additional chemotherapy.  Repeat CT volumetry has not yet been reported, but I told the patient I would call him with the results and the plan.  He has a pre-op appointment later today.  All questions answered and the patient was in agreement with and understanding of the plan.       A total of 20 minutes of face to face time was spent on this case, of which, more than half was spent in counseling and coordination of care.    Again, thank you for allowing me to participate in the care of your patient.      Sincerely,    Lele Luong MD

## 2020-01-17 NOTE — PROGRESS NOTES
Reason for Visit: Colorectal Cancer Liver Metastases     Pertinent Oncologic History: 59 M w/ rectosigmoid cancer s/p resection with synchronous liver metastases now s/p 13 cycles of chemotherapy including oxaliplatin with stable hepatic disease.  Two lesions, 1 in the right hepatic dome, segment 8 abutting the right hepatic vein and encroaching close to the vena cava. The second is deep in segment 7.  Liver cysts stable.  Last dose of chemotherapy 12/30/2019.  I originally saw the patient in clinic 9/13/2019 and liver volumetry demonstrated a marginal sFLR, as such, I recommended the patient undergo right PVE to generate a left FLR > 30%.  PVE was performed with microspheres and coils 10/10/2019.  However, he did not have the desired growth from this procedure and still had some patent portal flow.  As such, he was taken for attempted laparoscopic ablation, but the lesions were difficult to define in terms of borders on ultrasound.  As such, no ablation was carried out.  Liver biopsy showed microvascular injury, likely from chemotherapy.  He subsequently underwent re-embolization on 12/3/2019 and received further chemotherapy, last dose on 12/30/2019.  He is healthy and active with good performance status.  Non-smoker.  Minimal alcohol.  No history of hepatitis or cirrhosis.  He takes a beta blocker for HTN and a statin for HLD.     Pertinent Work-Up/Findings: CT volumetry from today is pending.     Pertinent Exam: Abdomen soft, non-tender, and non-distended.  No jaundice or scleral icterus.  Well healed lower midline laparotomy.     Assessment/Counseling/Plan: 59 M w/ colorectal cancer liver metastases in segments 8 and 7 with good response to 13 cycles of systemic therapy with platinum based chemotherapy.  His disease appears to be resectable with formal right hepatectomy given the abutment of the right hepatic vein in a proximal location.  I re-discussed the risks, benefits, and alternatives of the surgery.   Risks including, but not limited to death, DVT, PE, PNA, cardiac complications, UTI, injury to adjacent organs, infection, bleeding, bile leak, and hepatic failure were discussed.  Given his chemotherapy, need for major hepatectomy for the best oncologic outcomes, and need for an FLR of greater than 30%, recommended right PVE given an initial FLR at or just below 30% based on CT volumetry.  The first embolization did not produce the desired growth, but there was recanalization of a portion of the portal flow, so the patient was re-embolized on 12/3/2019.  He also received additional chemotherapy.  Repeat CT volumetry shows the following results based on the [facility] formula:    sTLV = 2196 ml  Left Lobe on Volumetry = 722 ml  sFLR = 32.8%    As such, the patient should have adequate liver remnant to tolerate a right hepatectomy.  We have called the patient and discussed this with him.  Plan is for surgery in early February.  All questions answered and the patient was in agreement with and understanding of the plan.       A total of 20 minutes of face to face time was spent on this case, of which, more than half was spent in counseling and coordination of care.

## 2020-01-17 NOTE — ANESTHESIA PREPROCEDURE EVALUATION
Anesthesia Pre-Procedure Evaluation    Patient: Ishan Arroyo   MRN:     [id number] Gender:   male   Age:    59 year old :      1960        Preoperative Diagnosis: * No surgery found *        Past Medical History:   Diagnosis Date     Colon cancer metastasized to liver (H) 2019     Essential hypertension 2013     High cholesterol 2019     Iron deficiency anemia 2019     Nephrolithiasis 2019      Past Surgical History:   Procedure Laterality Date     COLON SURGERY  2019    colectomy     IR VISCERAL EMBOLIZATION  10/10/2019     IR VISCERAL EMBOLIZATION  12/3/2019     LAPAROSCOPY DIAGNOSTIC (GENERAL) N/A 2019    Procedure: Diagnostic laparoscopy, intraoperative laparoscopic ultrasound, lysis of adhesions;  Surgeon: Lele Luong MD;  Location: [facility] OR     TONSILLECTOMY       [place] teeth extraction            Anesthesia Evaluation     . Pt has had prior anesthetic. Type: General    No history of anesthetic complications   - motion sickness  at OSH, GrIII view with Miller2, but other anesthetics have had straightfoward airways.      ROS/MED HX    ENT/Pulmonary:     (+)MATHEUS risk factors hypertension, tobacco use, Past use , . .    Neurologic:  - neg neurologic ROS     Cardiovascular:     (+) Dyslipidemia, hypertension----. : . . . :. . Previous cardiac testing Echodate:19results:date: results: date: results: date: results:         (-) taking anticoagulants/antiplatelets   METS/Exercise Tolerance:  >4 METS   Hematologic:     (+) Anemia, History of Transfusion no previous transfusion reaction -      Musculoskeletal:  - neg musculoskeletal ROS       GI/Hepatic:     (+) liver disease, Other GI/Hepatic Colon cancer metastasized to liver      Renal/Genitourinary:     (+) chronic renal disease, Nephrolithiasis ,       Endo:  - neg endo ROS       Psychiatric:  - neg psychiatric ROS       Infectious Disease:  - neg infectious disease ROS       Malignancy:   (+) Malignancy History  of GI  GI CA  Active status post Surgery and Chemo, Colon cancer metastasized to liver        Other:    (+) No chance of pregnancy C-spine cleared: N/A, no H/O Chronic Pain,no other significant disability                        PHYSICAL EXAM:   Mental Status/Neuro: A/A/O; Age Appropriate   Airway: Facies: Feasible  Mallampati: I  Mouth/Opening: Full  TM distance: > 6 cm  Neck ROM: Full   Respiratory: Auscultation: CTAB     Resp. Rate: Normal     Resp. Effort: Normal      CV: Rhythm: Regular  Heart: Normal Sounds  Edema: None   Comments:      Dental: Normal Dentition                LABS:  CBC:   Lab Results   Component Value Date    WBC 7.2 12/03/2019    WBC 6.3 10/10/2019    HGB 15.4 12/03/2019    HGB 13.6 10/10/2019    HCT 47.2 12/03/2019    HCT 42.0 10/10/2019     12/03/2019     10/10/2019     BMP:   Lab Results   Component Value Date     12/03/2019     10/10/2019    POTASSIUM 4.7 12/03/2019    POTASSIUM 3.8 10/10/2019    CHLORIDE 104 12/03/2019    CHLORIDE 106 10/10/2019    CO2 31 12/03/2019    CO2 27 10/10/2019    BUN 13 12/03/2019    BUN 13 10/10/2019    CR 0.72 12/03/2019    CR 0.59 (L) 10/10/2019    GLC 88 12/03/2019    GLC 84 10/10/2019     COAGS:   Lab Results   Component Value Date    PTT 35 10/10/2019    INR 1.05 12/03/2019     POC:   Lab Results   Component Value Date    BGM 80 12/03/2019     OTHER:   Lab Results   Component Value Date    IRENE 9.3 12/03/2019    ALBUMIN 3.8 12/03/2019    PROTTOTAL 8.0 12/03/2019    ALT 33 12/03/2019    AST 25 12/03/2019    ALKPHOS 98 12/03/2019    BILITOTAL 0.6 12/03/2019        Preop Vitals    BP Readings from Last 3 Encounters:   01/17/20 137/88   12/03/19 135/88   11/27/19 (!) 147/94    Pulse Readings from Last 3 Encounters:   01/17/20 66   12/03/19 86   11/27/19 62      Resp Readings from Last 3 Encounters:   12/03/19 16   11/27/19 16   11/08/19 19    SpO2 Readings from Last 3 Encounters:   01/17/20 100%   12/03/19 97%   11/27/19 97%      Temp  "Readings from Last 1 Encounters:   01/17/20 98.6  F (37  C) (Oral)    Ht Readings from Last 1 Encounters:   01/17/20 1.854 m (6' 0.99\")      Wt Readings from Last 1 Encounters:   01/17/20 108 kg (238 lb 1.6 oz)    Estimated body mass index is 31.42 kg/m  as calculated from the following:    Height as of an earlier encounter on 1/17/20: 1.854 m (6' 0.99\").    Weight as of an earlier encounter on 1/17/20: 108 kg (238 lb 1.6 oz).     LDA:  Port A Cath Single 01/01/19 Right Chest wall (Active)   Number of days: 381        JZG FV AN PLAN NO PONV RULE       PAC Discussion and Assessment    ASA Classification: 3  Case is suitable for: [place]  Anesthetic techniques and relevant risks discussed: GA  Invasive monitoring and risk discussed: No  Types:   Possibility and Risk of blood transfusion discussed: Yes  NPO instructions given:   Additional anesthetic preparation and risks discussed:   Needs early admission to pre-op area:   Other:     PAC Resident/NP Anesthesia Assessment:  Ishan Arroyo is a 59 year old male scheduled to undergo Diagnostic laparoscopy, open right hepatectomy, IOUS with Sherri Luong and Benson on 2/5/20. He has the following specific operative considerations:   - RCRI : 0.9% risk of major adverse cardiac event.   - VTE risk: 3%  - MATHEUS # of risks 4/8 = Intermediate risk  - Risk of PONV score = 2.  If > 2, anti-emetic intervention recommended.    --Metastatic colon cancer to liver. s/p colon resection 1/22/19 and portal vein embolization on 10/10/19. Above procedures now planned.   --HLD. Simvastatin at HS. HTN. Remote history of irregular HR, denies atrial fib. Metoprolol at HS. No other cardiac history, symptoms or meds. Testing above. Good exercise tolerance.   --Nonsmoker. Chews occasionally. No pulmonary symptoms   --History of anemia. Hgb today 14.9. History of blood transfusion. Type and screen drawn today.   --Right upper chest port.            Patient was discussed with Dr" Kale.      Reviewed and Signed by PAC Mid-Level Provider/Resident  Mid-Level Provider/Resident: STEVE Azar CNS  Date: 1/17/20  Time: 12:30pm    Attending Anesthesiologist Anesthesia Assessment:        Anesthesiologist:   Date:   Time:   Pass/Fail:   Disposition:     PAC Pharmacist Assessment:        Pharmacist:   Date:   Time:    STEVE Braun

## 2020-01-17 NOTE — PATIENT INSTRUCTIONS
Preparing for Your Surgery      Name:  Ishan Arroyo   MRN:  [id number]   :  1960   Today's Date:  2020     Arriving for surgery:  Surgery date:  20  Arrival time:  5:45 am  Please come to:       [facility] [address]  [address] [place]  [address]    - ? parking is available in front of the hospital      -    Please proceed to [address] on the 3rd floor. [phone]?     - ?If you are in need of directions, wheelchair or escort please stop at the Information Desk in the lobby.  Inform the information person that you are here for surgery; a wheelchair and escort to [address] will be provided.?     What can I eat or drink?  -  You may have solid food or milk products until 8 hours prior to your surgery (11:45 pm).  -  You may have water, apple juice or 7up/Sprite until 2 hours prior to your surgery (5:45 am).    Which medicines can I take?  Stop Aspirin, vitamins and supplements one week prior to surgery.  Hold Ibuprofen for 24 hours and/or Naproxen for 48 hours prior to surgery.  Hold Polyethylene Glycol (Miralax) the morning of surgery.    -  Please take these medications the day of surgery:  Acetaminophen (Tylenol)  Minocycline (Dynacin)    How do I prepare myself?  -  Take two showers: one the night before surgery; and one the morning of surgery.         Use Scrubcare or Hibiclens to wash from neck down, leave soap on your skin for up to one minute.  Do not get soap in your eyes or ears.  You may use your own shampoo and conditioner; no other hair products.   -  Do NOT use lotion, powder, deodorant, or antiperspirant the day of your surgery.  -  Do NOT wear any jewelry.  -  Do not bring your own medications to the hospital.  -  Bring your ID and insurance card.    Questions or Concerns:  -If you are scheduled on the [facility] or [facility] and have questions or concerns regarding the day of surgery, please call Preadmission Nursing at [phone].      -If you have health changes between today and your surgery please call your surgeon. For questions after surgery please call your surgeons office.     AFTER YOUR SURGERY  Breathing exercises   Breathing exercises help you recover faster. Take deep breaths and let the air out slowly. This will:     Help you wake up after surgery.    Help prevent complications like pneumonia.  Preventing complications will help you go home sooner.   We may give you a breathing device (incentive spirometer) to encourage you to breathe deeply.   Nausea and vomiting   You may feel sick to your stomach after surgery; if so, let your nurse know.    Pain control:  After surgery, you may have pain. Our goal is to help you manage your pain. Pain medicine will help you feel comfortable enough to do activities that will help you heal.  These activities may include breathing exercises, walking and physical therapy.   To help your health care team treat your pain we will ask: 1) If you have pain  2) where it is located 3) describe your pain in your words  Methods of pain control include medications given by mouth, vein or by nerve block for some surgeries.  Sequential Compression Device (SCD):  You may need to wear SCD S (also called pneumo boots)on your legs or feet. These are wraps connected to a machine that pumps in air and releases it. The repeated pumping helps prevent blood clots from forming.

## 2020-01-17 NOTE — NURSING NOTE
"Oncology Rooming Note    January 17, 2020 11:03 AM   Ishan Arroyo is a 59 year old male who presents for:    Chief Complaint   Patient presents with     Oncology Clinic Visit     Return visit; colon cancer; vitals taken     Initial Vitals: /88   Pulse 66   Temp 98.6  F (37  C) (Oral)   Ht 1.854 m (6' 0.99\")   Wt 108 kg (238 lb 1.6 oz)   SpO2 100%   BMI 31.42 kg/m   Estimated body mass index is 31.42 kg/m  as calculated from the following:    Height as of this encounter: 1.854 m (6' 0.99\").    Weight as of this encounter: 108 kg (238 lb 1.6 oz). Body surface area is 2.36 meters squared.  No Pain (0) Comment: Data Unavailable   No LMP for male patient.  Allergies reviewed: Yes  Medications reviewed: Yes    Medications: Medication refills not needed today.  Pharmacy name entered into EPIC: [facility] - [place], [place] - [address] [place] [address]    Clinical concerns: No new concerns.  Dr. Luong was notified.      HECTOR RIVAS, [facility]            "

## 2020-01-20 ENCOUNTER — TELEPHONE (OUTPATIENT)
Dept: SURGERY | Facility: CLINIC | Age: 60
End: 2020-01-20

## 2020-01-20 NOTE — TELEPHONE ENCOUNTER
I called to inform the patient that his volumetry is back and that his left liver is now large enough to tolerate right hepatectomy.  So we plan on proceeding with surgery in early February.  All questions answered and the patient was in agreement with and understanding of the plan.

## 2020-01-27 ENCOUNTER — DOCUMENTATION ONLY (OUTPATIENT)
Dept: CARE COORDINATION | Facility: CLINIC | Age: 60
End: 2020-01-27

## 2020-01-27 NOTE — PROGRESS NOTES
Per Patient's request,  completed and faxed [employer] request for lodging dates 2/4-2/5. [employer] will contact Patient for confirmation of reservation.  will continue to provide support as needed.    Soo Yeon Han, [facility]  Pager:  [phone]

## 2020-02-04 ENCOUNTER — ANESTHESIA EVENT (OUTPATIENT)
Dept: SURGERY | Facility: CLINIC | Age: 60
DRG: 406 | End: 2020-02-04
Payer: COMMERCIAL

## 2020-02-04 ASSESSMENT — LIFESTYLE VARIABLES: TOBACCO_USE: 1

## 2020-02-04 NOTE — ANESTHESIA PREPROCEDURE EVALUATION
Anesthesia Pre-Procedure Evaluation    Patient: Ishan Arroyo   MRN:     [id number] Gender:   male   Age:    59 year old :      1960        Preoperative Diagnosis: Metastatic colon cancer to liver (H) [C18.9, C78.7]   Procedure(s):  Diagnostic laparoscopy  open right hepatectomy  intra-operative liver ultrasound     Past Medical History:   Diagnosis Date     Colon cancer metastasized to liver (H) 2019     Essential hypertension 2013     High cholesterol 2019     Iron deficiency anemia 2019     Nephrolithiasis 2019      Past Surgical History:   Procedure Laterality Date     COLON SURGERY  2019    colectomy     IR VISCERAL EMBOLIZATION  10/10/2019     IR VISCERAL EMBOLIZATION  12/3/2019     LAPAROSCOPY DIAGNOSTIC (GENERAL) N/A 2019    Procedure: Diagnostic laparoscopy, intraoperative laparoscopic ultrasound, lysis of adhesions;  Surgeon: Lele Luong MD;  Location: [facility] OR     TONSILLECTOMY       [place] teeth extraction            Anesthesia Evaluation     . Pt has had prior anesthetic. Type: General    No history of anesthetic complications   - motion sickness  at OSH, GrIII view with Miller2, but other anesthetics have had straightfoward airways.      ROS/MED HX    ENT/Pulmonary:     (+)MATHEUS risk factors hypertension, tobacco use, Past use , . .    Neurologic:  - neg neurologic ROS     Cardiovascular:     (+) Dyslipidemia, hypertension----. : . . . :. . Previous cardiac testing Echodate:19results:date: results: date: results: date: results:         (-) taking anticoagulants/antiplatelets   METS/Exercise Tolerance:  >4 METS   Hematologic:     (+) Anemia, History of Transfusion no previous transfusion reaction -      Musculoskeletal:  - neg musculoskeletal ROS       GI/Hepatic:     (+) liver disease, Other GI/Hepatic Colon cancer metastasized to liver      Renal/Genitourinary:     (+) chronic renal disease, Nephrolithiasis ,       Endo:  - neg endo ROS        Psychiatric:  - neg psychiatric ROS       Infectious Disease:  - neg infectious disease ROS       Malignancy:   (+) Malignancy History of GI  GI CA  Active status post Surgery and Chemo, Colon cancer metastasized to liver        Other:    (+) No chance of pregnancy C-spine cleared: N/A, no H/O Chronic Pain,no other significant disability                        PHYSICAL EXAM:   Mental Status/Neuro: A/A/O   Airway: Facies: Feasible  Mallampati: II  Mouth/Opening: Full  TM distance: > 6 cm  Neck ROM: Full   Respiratory: Auscultation: CTAB     Resp. Rate: Normal     Resp. Effort: Normal      CV: Rhythm: Regular  Rate: Age appropriate  Heart: Normal Sounds  Edema: None   Comments:      Dental: Normal Dentition                LABS:  CBC:   Lab Results   Component Value Date    WBC 6.0 01/17/2020    WBC 7.2 12/03/2019    HGB 14.9 01/17/2020    HGB 15.4 12/03/2019    HCT 45.3 01/17/2020    HCT 47.2 12/03/2019     (L) 01/17/2020     12/03/2019     BMP:   Lab Results   Component Value Date     01/17/2020     12/03/2019    POTASSIUM 3.4 01/17/2020    POTASSIUM 4.7 12/03/2019    CHLORIDE 106 01/17/2020    CHLORIDE 104 12/03/2019    CO2 31 01/17/2020    CO2 31 12/03/2019    BUN 8 01/17/2020    BUN 13 12/03/2019    CR 0.53 (L) 01/17/2020    CR 0.72 12/03/2019    GLC 98 01/17/2020    GLC 88 12/03/2019     COAGS:   Lab Results   Component Value Date    PTT 35 10/10/2019    INR 1.05 12/03/2019     POC:   Lab Results   Component Value Date    BGM 80 12/03/2019     OTHER:   Lab Results   Component Value Date    IRENE 9.1 01/17/2020    ALBUMIN 3.4 01/17/2020    PROTTOTAL 7.8 01/17/2020    ALT 35 01/17/2020    AST 33 01/17/2020    ALKPHOS 136 01/17/2020    BILITOTAL 0.6 01/17/2020        Preop Vitals    BP Readings from Last 3 Encounters:   01/17/20 137/88   01/17/20 137/88   12/03/19 135/88    Pulse Readings from Last 3 Encounters:   01/17/20 66   01/17/20 66   12/03/19 86      Resp Readings from Last 3  Encounters:   01/17/20 19   12/03/19 16   11/27/19 16    SpO2 Readings from Last 3 Encounters:   01/17/20 100%   01/17/20 100%   12/03/19 97%      Temp Readings from Last 1 Encounters:   01/17/20 37.1  C (98.7  F) (Oral)    Ht Readings from Last 1 Encounters:   01/17/20 1.829 m (6')      Wt Readings from Last 1 Encounters:   01/17/20 108 kg (238 lb)    Estimated body mass index is 32.28 kg/m  as calculated from the following:    Height as of 1/17/20: 1.829 m (6').    Weight as of 1/17/20: 108 kg (238 lb).     LDA:  Port A Cath Single 01/01/19 Right Chest wall (Active)   Number of days: 399        Assessment:   ASA SCORE: 3    H&P: History and physical reviewed and following examination; no interval change.   Smoking Status:  Non-Smoker/Unknown   NPO Status: NPO Appropriate     Plan:   Anes. Type:  General   Pre-Medication: None   Induction:  IV (Standard)   Airway: ETT; Oral   Access/Monitoring: PIV; A-Line; 2nd PIV; CVL   Maintenance: Balanced     Postop Plan:   Postop Pain: Opioids  Postop Sedation/Airway: Not planned  Disposition: Inpatient/Admit     PONV Management:   Adult Risk Factors:, Non-Smoker, Postop Opioids   Prevention: Ondansetron     CONSENT: Direct conversation   Plan and risks discussed with: Patient   Blood Products: Consented (ALL Blood Products)                   Dante Nix DO

## 2020-02-05 ENCOUNTER — HOSPITAL ENCOUNTER (INPATIENT)
Facility: CLINIC | Age: 60
LOS: 6 days | Discharge: HOME | DRG: 406 | End: 2020-02-11
Attending: SURGERY | Admitting: SURGERY
Payer: COMMERCIAL

## 2020-02-05 ENCOUNTER — APPOINTMENT (OUTPATIENT)
Dept: GENERAL RADIOLOGY | Facility: CLINIC | Age: 60
DRG: 406 | End: 2020-02-05
Attending: SURGERY
Payer: COMMERCIAL

## 2020-02-05 ENCOUNTER — ANESTHESIA (OUTPATIENT)
Dept: SURGERY | Facility: CLINIC | Age: 60
DRG: 406 | End: 2020-02-05
Payer: COMMERCIAL

## 2020-02-05 ENCOUNTER — ANCILLARY PROCEDURE (OUTPATIENT)
Dept: ULTRASOUND IMAGING | Facility: CLINIC | Age: 60
End: 2020-02-05
Payer: COMMERCIAL

## 2020-02-05 DIAGNOSIS — K76.9 LIVER LESION, RIGHT LOBE: Primary | ICD-10-CM

## 2020-02-05 DIAGNOSIS — I48.91 ATRIAL FIBRILLATION WITH RVR (H): ICD-10-CM

## 2020-02-05 DIAGNOSIS — C78.7 METASTATIC COLON CANCER TO LIVER (H): ICD-10-CM

## 2020-02-05 DIAGNOSIS — C18.9 METASTATIC COLON CANCER TO LIVER (H): ICD-10-CM

## 2020-02-05 LAB
ABO + RH BLD: NORMAL
ABO + RH BLD: NORMAL
ALBUMIN SERPL-MCNC: 3 G/DL (ref 3.4–5)
ALBUMIN SERPL-MCNC: 3.3 G/DL (ref 3.4–5)
ALP SERPL-CCNC: 74 U/L (ref 40–150)
ALP SERPL-CCNC: 78 U/L (ref 40–150)
ALT SERPL W P-5'-P-CCNC: 326 U/L (ref 0–70)
ALT SERPL W P-5'-P-CCNC: 364 U/L (ref 0–70)
ANION GAP SERPL CALCULATED.3IONS-SCNC: 5 MMOL/L (ref 3–14)
ANION GAP SERPL CALCULATED.3IONS-SCNC: 6 MMOL/L (ref 3–14)
AST SERPL W P-5'-P-CCNC: 488 U/L (ref 0–45)
AST SERPL W P-5'-P-CCNC: 507 U/L (ref 0–45)
BASE DEFICIT BLDA-SCNC: 2.6 MMOL/L
BASE DEFICIT BLDA-SCNC: 3.6 MMOL/L
BILIRUB SERPL-MCNC: 2.1 MG/DL (ref 0.2–1.3)
BILIRUB SERPL-MCNC: 3.5 MG/DL (ref 0.2–1.3)
BLD GP AB SCN SERPL QL: NORMAL
BLD PROD TYP BPU: NORMAL
BLD UNIT ID BPU: 0
BLD UNIT ID BPU: 0
BLOOD BANK CMNT PATIENT-IMP: NORMAL
BLOOD BANK CMNT PATIENT-IMP: NORMAL
BLOOD PRODUCT CODE: NORMAL
BLOOD PRODUCT CODE: NORMAL
BPU ID: NORMAL
BPU ID: NORMAL
BUN SERPL-MCNC: 10 MG/DL (ref 7–30)
BUN SERPL-MCNC: 10 MG/DL (ref 7–30)
CA-I BLD-MCNC: 4.2 MG/DL (ref 4.4–5.2)
CA-I BLD-MCNC: 5.7 MG/DL (ref 4.4–5.2)
CALCIUM SERPL-MCNC: 8.4 MG/DL (ref 8.5–10.1)
CALCIUM SERPL-MCNC: 8.7 MG/DL (ref 8.5–10.1)
CHLORIDE SERPL-SCNC: 110 MMOL/L (ref 94–109)
CHLORIDE SERPL-SCNC: 111 MMOL/L (ref 94–109)
CO2 SERPL-SCNC: 24 MMOL/L (ref 20–32)
CO2 SERPL-SCNC: 25 MMOL/L (ref 20–32)
CREAT SERPL-MCNC: 0.5 MG/DL (ref 0.66–1.25)
CREAT SERPL-MCNC: 0.55 MG/DL (ref 0.66–1.25)
ERYTHROCYTE [DISTWIDTH] IN BLOOD BY AUTOMATED COUNT: 16.7 % (ref 10–15)
ERYTHROCYTE [DISTWIDTH] IN BLOOD BY AUTOMATED COUNT: 16.7 % (ref 10–15)
GFR SERPL CREATININE-BSD FRML MDRD: >90 ML/MIN/{1.73_M2}
GFR SERPL CREATININE-BSD FRML MDRD: >90 ML/MIN/{1.73_M2}
GLUCOSE BLD-MCNC: 147 MG/DL (ref 70–99)
GLUCOSE BLD-MCNC: 92 MG/DL (ref 70–99)
GLUCOSE BLDC GLUCOMTR-MCNC: 118 MG/DL (ref 70–99)
GLUCOSE BLDC GLUCOMTR-MCNC: 97 MG/DL (ref 70–99)
GLUCOSE SERPL-MCNC: 138 MG/DL (ref 70–99)
GLUCOSE SERPL-MCNC: 99 MG/DL (ref 70–99)
HCO3 BLD-SCNC: 20 MMOL/L (ref 21–28)
HCO3 BLD-SCNC: 22 MMOL/L (ref 21–28)
HCT VFR BLD AUTO: 38.9 % (ref 40–53)
HCT VFR BLD AUTO: 40.9 % (ref 40–53)
HGB BLD-MCNC: 11.1 G/DL (ref 13.3–17.7)
HGB BLD-MCNC: 12.5 G/DL (ref 13.3–17.7)
HGB BLD-MCNC: 12.7 G/DL (ref 13.3–17.7)
HGB BLD-MCNC: 13.2 G/DL (ref 13.3–17.7)
HGB BLD-MCNC: 14.7 G/DL (ref 13.3–17.7)
INR PPP: 1.28 (ref 0.86–1.14)
INR PPP: 1.29 (ref 0.86–1.14)
LACTATE BLD-SCNC: 1.7 MMOL/L (ref 0.7–2)
LACTATE BLD-SCNC: 1.8 MMOL/L (ref 0.7–2)
MAGNESIUM SERPL-MCNC: 1.5 MG/DL (ref 1.6–2.3)
MCH RBC QN AUTO: 29.1 PG (ref 26.5–33)
MCH RBC QN AUTO: 29.4 PG (ref 26.5–33)
MCHC RBC AUTO-ENTMCNC: 32.3 G/DL (ref 31.5–36.5)
MCHC RBC AUTO-ENTMCNC: 32.6 G/DL (ref 31.5–36.5)
MCV RBC AUTO: 90 FL (ref 78–100)
MCV RBC AUTO: 90 FL (ref 78–100)
NUM BPU REQUESTED: 2
O2/TOTAL GAS SETTING VFR VENT: 40 %
O2/TOTAL GAS SETTING VFR VENT: 40 %
PCO2 BLD: 33 MM HG (ref 35–45)
PCO2 BLD: 38 MM HG (ref 35–45)
PH BLD: 7.37 PH (ref 7.35–7.45)
PH BLD: 7.41 PH (ref 7.35–7.45)
PHOSPHATE SERPL-MCNC: 3.5 MG/DL (ref 2.5–4.5)
PLATELET # BLD AUTO: 220 10E9/L (ref 150–450)
PLATELET # BLD AUTO: 262 10E9/L (ref 150–450)
PO2 BLD: 108 MM HG (ref 80–105)
PO2 BLD: 115 MM HG (ref 80–105)
POTASSIUM BLD-SCNC: 3.4 MMOL/L (ref 3.4–5.3)
POTASSIUM BLD-SCNC: 3.9 MMOL/L (ref 3.4–5.3)
POTASSIUM SERPL-SCNC: 4.3 MMOL/L (ref 3.4–5.3)
POTASSIUM SERPL-SCNC: 4.3 MMOL/L (ref 3.4–5.3)
PROT SERPL-MCNC: 6 G/DL (ref 6.8–8.8)
PROT SERPL-MCNC: 6.6 G/DL (ref 6.8–8.8)
RBC # BLD AUTO: 4.32 10E12/L (ref 4.4–5.9)
RBC # BLD AUTO: 4.53 10E12/L (ref 4.4–5.9)
SODIUM BLD-SCNC: 140 MMOL/L (ref 133–144)
SODIUM BLD-SCNC: 141 MMOL/L (ref 133–144)
SODIUM SERPL-SCNC: 140 MMOL/L (ref 133–144)
SODIUM SERPL-SCNC: 141 MMOL/L (ref 133–144)
SPECIMEN EXP DATE BLD: NORMAL
TRANSFUSION STATUS PATIENT QL: NORMAL
TROPONIN I SERPL-MCNC: <0.015 UG/L (ref 0–0.04)
WBC # BLD AUTO: 15 10E9/L (ref 4–11)
WBC # BLD AUTO: 17 10E9/L (ref 4–11)

## 2020-02-05 PROCEDURE — 80053 COMPREHEN METABOLIC PANEL: CPT | Performed by: SURGERY

## 2020-02-05 PROCEDURE — 36415 COLL VENOUS BLD VENIPUNCTURE: CPT | Performed by: SURGERY

## 2020-02-05 PROCEDURE — 84132 ASSAY OF SERUM POTASSIUM: CPT | Performed by: STUDENT IN AN ORGANIZED HEALTH CARE EDUCATION/TRAINING PROGRAM

## 2020-02-05 PROCEDURE — 93010 ELECTROCARDIOGRAM REPORT: CPT | Performed by: INTERNAL MEDICINE

## 2020-02-05 PROCEDURE — 25800030 ZZH RX IP 258 OP 636: Performed by: STUDENT IN AN ORGANIZED HEALTH CARE EDUCATION/TRAINING PROGRAM

## 2020-02-05 PROCEDURE — 25000125 ZZHC RX 250: Performed by: STUDENT IN AN ORGANIZED HEALTH CARE EDUCATION/TRAINING PROGRAM

## 2020-02-05 PROCEDURE — 0FB10ZZ EXCISION OF RIGHT LOBE LIVER, OPEN APPROACH: ICD-10-PCS | Performed by: SURGERY

## 2020-02-05 PROCEDURE — 83735 ASSAY OF MAGNESIUM: CPT | Performed by: SURGERY

## 2020-02-05 PROCEDURE — 40000275 ZZH STATISTIC RCP TIME EA 10 MIN

## 2020-02-05 PROCEDURE — 25000125 ZZHC RX 250: Performed by: NURSE ANESTHETIST, CERTIFIED REGISTERED

## 2020-02-05 PROCEDURE — P9041 ALBUMIN (HUMAN),5%, 50ML: HCPCS | Performed by: STUDENT IN AN ORGANIZED HEALTH CARE EDUCATION/TRAINING PROGRAM

## 2020-02-05 PROCEDURE — 71000014 ZZH RECOVERY PHASE 1 LEVEL 2 FIRST HR: Performed by: SURGERY

## 2020-02-05 PROCEDURE — 20000004 ZZH R&B ICU UMMC

## 2020-02-05 PROCEDURE — 40000986 XR CHEST PORT 1 VW

## 2020-02-05 PROCEDURE — 85610 PROTHROMBIN TIME: CPT | Performed by: SURGERY

## 2020-02-05 PROCEDURE — 71000015 ZZH RECOVERY PHASE 1 LEVEL 2 EA ADDTL HR: Performed by: SURGERY

## 2020-02-05 PROCEDURE — 40000171 ZZH STATISTIC PRE-PROCEDURE ASSESSMENT III: Performed by: SURGERY

## 2020-02-05 PROCEDURE — 84484 ASSAY OF TROPONIN QUANT: CPT | Performed by: SURGERY

## 2020-02-05 PROCEDURE — 27210794 ZZH OR GENERAL SUPPLY STERILE: Performed by: SURGERY

## 2020-02-05 PROCEDURE — 85027 COMPLETE CBC AUTOMATED: CPT | Performed by: SURGERY

## 2020-02-05 PROCEDURE — 88304 TISSUE EXAM BY PATHOLOGIST: CPT | Performed by: SURGERY

## 2020-02-05 PROCEDURE — 36000068 ZZH SURGERY LEVEL 5 1ST 30 MIN - UMMC: Performed by: SURGERY

## 2020-02-05 PROCEDURE — 84295 ASSAY OF SERUM SODIUM: CPT | Performed by: STUDENT IN AN ORGANIZED HEALTH CARE EDUCATION/TRAINING PROGRAM

## 2020-02-05 PROCEDURE — 88313 SPECIAL STAINS GROUP 2: CPT | Performed by: SURGERY

## 2020-02-05 PROCEDURE — 25800030 ZZH RX IP 258 OP 636: Performed by: SURGERY

## 2020-02-05 PROCEDURE — 25000128 H RX IP 250 OP 636: Performed by: NURSE ANESTHETIST, CERTIFIED REGISTERED

## 2020-02-05 PROCEDURE — 25000128 H RX IP 250 OP 636: Performed by: STUDENT IN AN ORGANIZED HEALTH CARE EDUCATION/TRAINING PROGRAM

## 2020-02-05 PROCEDURE — 0FT40ZZ RESECTION OF GALLBLADDER, OPEN APPROACH: ICD-10-PCS | Performed by: SURGERY

## 2020-02-05 PROCEDURE — 27210995 ZZH RX 272: Performed by: SURGERY

## 2020-02-05 PROCEDURE — 25000566 ZZH SEVOFLURANE, EA 15 MIN: Performed by: SURGERY

## 2020-02-05 PROCEDURE — 88307 TISSUE EXAM BY PATHOLOGIST: CPT | Performed by: SURGERY

## 2020-02-05 PROCEDURE — 00000146 ZZHCL STATISTIC GLUCOSE BY METER IP

## 2020-02-05 PROCEDURE — 37000008 ZZH ANESTHESIA TECHNICAL FEE, 1ST 30 MIN: Performed by: SURGERY

## 2020-02-05 PROCEDURE — 0BNT0ZZ RELEASE DIAPHRAGM, OPEN APPROACH: ICD-10-PCS | Performed by: SURGERY

## 2020-02-05 PROCEDURE — 85018 HEMOGLOBIN: CPT | Performed by: SURGERY

## 2020-02-05 PROCEDURE — P9016 RBC LEUKOCYTES REDUCED: HCPCS | Performed by: CLINICAL NURSE SPECIALIST

## 2020-02-05 PROCEDURE — 40000014 ZZH STATISTIC ARTERIAL MONITORING DAILY

## 2020-02-05 PROCEDURE — P9041 ALBUMIN (HUMAN),5%, 50ML: HCPCS | Performed by: SURGERY

## 2020-02-05 PROCEDURE — C9290 INJ, BUPIVACAINE LIPOSOME: HCPCS | Performed by: STUDENT IN AN ORGANIZED HEALTH CARE EDUCATION/TRAINING PROGRAM

## 2020-02-05 PROCEDURE — 40000196 ZZH STATISTIC RAPCV CVP MONITORING

## 2020-02-05 PROCEDURE — 93005 ELECTROCARDIOGRAM TRACING: CPT

## 2020-02-05 PROCEDURE — 25000128 H RX IP 250 OP 636: Performed by: SURGERY

## 2020-02-05 PROCEDURE — 82947 ASSAY GLUCOSE BLOOD QUANT: CPT | Performed by: STUDENT IN AN ORGANIZED HEALTH CARE EDUCATION/TRAINING PROGRAM

## 2020-02-05 PROCEDURE — 37000009 ZZH ANESTHESIA TECHNICAL FEE, EACH ADDTL 15 MIN: Performed by: SURGERY

## 2020-02-05 PROCEDURE — 83605 ASSAY OF LACTIC ACID: CPT | Performed by: STUDENT IN AN ORGANIZED HEALTH CARE EDUCATION/TRAINING PROGRAM

## 2020-02-05 PROCEDURE — 36000070 ZZH SURGERY LEVEL 5 EA 15 ADDTL MIN - UMMC: Performed by: SURGERY

## 2020-02-05 PROCEDURE — 82803 BLOOD GASES ANY COMBINATION: CPT | Performed by: STUDENT IN AN ORGANIZED HEALTH CARE EDUCATION/TRAINING PROGRAM

## 2020-02-05 PROCEDURE — 84100 ASSAY OF PHOSPHORUS: CPT | Performed by: SURGERY

## 2020-02-05 PROCEDURE — 82330 ASSAY OF CALCIUM: CPT | Performed by: STUDENT IN AN ORGANIZED HEALTH CARE EDUCATION/TRAINING PROGRAM

## 2020-02-05 RX ORDER — BUPIVACAINE HYDROCHLORIDE 2.5 MG/ML
INJECTION, SOLUTION EPIDURAL; INFILTRATION; INTRACAUDAL PRN
Status: DISCONTINUED | OUTPATIENT
Start: 2020-02-05 | End: 2020-02-05

## 2020-02-05 RX ORDER — ESMOLOL HYDROCHLORIDE 10 MG/ML
INJECTION INTRAVENOUS PRN
Status: DISCONTINUED | OUTPATIENT
Start: 2020-02-05 | End: 2020-02-05

## 2020-02-05 RX ORDER — LIDOCAINE HYDROCHLORIDE 20 MG/ML
INJECTION, SOLUTION INFILTRATION; PERINEURAL PRN
Status: DISCONTINUED | OUTPATIENT
Start: 2020-02-05 | End: 2020-02-05

## 2020-02-05 RX ORDER — ALBUMIN, HUMAN INJ 5% 5 %
12.5 SOLUTION INTRAVENOUS ONCE
Status: COMPLETED | OUTPATIENT
Start: 2020-02-05 | End: 2020-02-05

## 2020-02-05 RX ORDER — ONDANSETRON 2 MG/ML
INJECTION INTRAMUSCULAR; INTRAVENOUS PRN
Status: DISCONTINUED | OUTPATIENT
Start: 2020-02-05 | End: 2020-02-05

## 2020-02-05 RX ORDER — MAGNESIUM SULFATE HEPTAHYDRATE 40 MG/ML
2 INJECTION, SOLUTION INTRAVENOUS ONCE
Status: COMPLETED | OUTPATIENT
Start: 2020-02-05 | End: 2020-02-05

## 2020-02-05 RX ORDER — SODIUM CHLORIDE, SODIUM LACTATE, POTASSIUM CHLORIDE, CALCIUM CHLORIDE 600; 310; 30; 20 MG/100ML; MG/100ML; MG/100ML; MG/100ML
INJECTION, SOLUTION INTRAVENOUS CONTINUOUS PRN
Status: DISCONTINUED | OUTPATIENT
Start: 2020-02-05 | End: 2020-02-05

## 2020-02-05 RX ORDER — SODIUM CHLORIDE, SODIUM LACTATE, POTASSIUM CHLORIDE, CALCIUM CHLORIDE 600; 310; 30; 20 MG/100ML; MG/100ML; MG/100ML; MG/100ML
INJECTION, SOLUTION INTRAVENOUS CONTINUOUS
Status: DISCONTINUED | OUTPATIENT
Start: 2020-02-05 | End: 2020-02-05 | Stop reason: HOSPADM

## 2020-02-05 RX ORDER — ONDANSETRON 2 MG/ML
4 INJECTION INTRAMUSCULAR; INTRAVENOUS EVERY 30 MIN PRN
Status: DISCONTINUED | OUTPATIENT
Start: 2020-02-05 | End: 2020-02-05 | Stop reason: HOSPADM

## 2020-02-05 RX ORDER — FLUMAZENIL 0.1 MG/ML
0.2 INJECTION, SOLUTION INTRAVENOUS
Status: DISCONTINUED | OUTPATIENT
Start: 2020-02-05 | End: 2020-02-05 | Stop reason: HOSPADM

## 2020-02-05 RX ORDER — SODIUM CHLORIDE, SODIUM LACTATE, POTASSIUM CHLORIDE, CALCIUM CHLORIDE 600; 310; 30; 20 MG/100ML; MG/100ML; MG/100ML; MG/100ML
INJECTION, SOLUTION INTRAVENOUS CONTINUOUS
Status: DISCONTINUED | OUTPATIENT
Start: 2020-02-05 | End: 2020-02-06

## 2020-02-05 RX ORDER — NALOXONE HYDROCHLORIDE 0.4 MG/ML
.1-.4 INJECTION, SOLUTION INTRAMUSCULAR; INTRAVENOUS; SUBCUTANEOUS
Status: DISCONTINUED | OUTPATIENT
Start: 2020-02-05 | End: 2020-02-05 | Stop reason: HOSPADM

## 2020-02-05 RX ORDER — DEXTROSE MONOHYDRATE 25 G/50ML
25-50 INJECTION, SOLUTION INTRAVENOUS
Status: DISCONTINUED | OUTPATIENT
Start: 2020-02-05 | End: 2020-02-11 | Stop reason: HOSPADM

## 2020-02-05 RX ORDER — FENTANYL CITRATE 50 UG/ML
INJECTION, SOLUTION INTRAMUSCULAR; INTRAVENOUS PRN
Status: DISCONTINUED | OUTPATIENT
Start: 2020-02-05 | End: 2020-02-05

## 2020-02-05 RX ORDER — FENTANYL CITRATE 50 UG/ML
25-50 INJECTION, SOLUTION INTRAMUSCULAR; INTRAVENOUS
Status: DISCONTINUED | OUTPATIENT
Start: 2020-02-05 | End: 2020-02-05 | Stop reason: HOSPADM

## 2020-02-05 RX ORDER — PROCHLORPERAZINE MALEATE 5 MG
10 TABLET ORAL EVERY 6 HOURS PRN
Status: DISCONTINUED | OUTPATIENT
Start: 2020-02-05 | End: 2020-02-11 | Stop reason: HOSPADM

## 2020-02-05 RX ORDER — NICOTINE POLACRILEX 4 MG
15-30 LOZENGE BUCCAL
Status: DISCONTINUED | OUTPATIENT
Start: 2020-02-05 | End: 2020-02-11 | Stop reason: HOSPADM

## 2020-02-05 RX ORDER — EPHEDRINE SULFATE 50 MG/ML
INJECTION, SOLUTION INTRAMUSCULAR; INTRAVENOUS; SUBCUTANEOUS PRN
Status: DISCONTINUED | OUTPATIENT
Start: 2020-02-05 | End: 2020-02-05

## 2020-02-05 RX ORDER — HYDROMORPHONE HYDROCHLORIDE 1 MG/ML
.3-.5 INJECTION, SOLUTION INTRAMUSCULAR; INTRAVENOUS; SUBCUTANEOUS EVERY 5 MIN PRN
Status: DISCONTINUED | OUTPATIENT
Start: 2020-02-05 | End: 2020-02-05 | Stop reason: HOSPADM

## 2020-02-05 RX ORDER — CEFOXITIN 2 G/1
INJECTION, POWDER, FOR SOLUTION INTRAVENOUS PRN
Status: DISCONTINUED | OUTPATIENT
Start: 2020-02-05 | End: 2020-02-05

## 2020-02-05 RX ORDER — CEFAZOLIN SODIUM 2 G/100ML
2 INJECTION, SOLUTION INTRAVENOUS
Status: DISCONTINUED | OUTPATIENT
Start: 2020-02-05 | End: 2020-02-05 | Stop reason: HOSPADM

## 2020-02-05 RX ORDER — NALOXONE HYDROCHLORIDE 0.4 MG/ML
.1-.4 INJECTION, SOLUTION INTRAMUSCULAR; INTRAVENOUS; SUBCUTANEOUS
Status: DISCONTINUED | OUTPATIENT
Start: 2020-02-05 | End: 2020-02-05

## 2020-02-05 RX ORDER — ONDANSETRON 4 MG/1
4 TABLET, ORALLY DISINTEGRATING ORAL EVERY 6 HOURS PRN
Status: DISCONTINUED | OUTPATIENT
Start: 2020-02-05 | End: 2020-02-11 | Stop reason: HOSPADM

## 2020-02-05 RX ORDER — ONDANSETRON 2 MG/ML
4 INJECTION INTRAMUSCULAR; INTRAVENOUS EVERY 6 HOURS PRN
Status: DISCONTINUED | OUTPATIENT
Start: 2020-02-05 | End: 2020-02-11 | Stop reason: HOSPADM

## 2020-02-05 RX ORDER — LIDOCAINE 40 MG/G
CREAM TOPICAL
Status: DISCONTINUED | OUTPATIENT
Start: 2020-02-05 | End: 2020-02-11 | Stop reason: HOSPADM

## 2020-02-05 RX ORDER — CEFAZOLIN SODIUM 1 G/3ML
1 INJECTION, POWDER, FOR SOLUTION INTRAMUSCULAR; INTRAVENOUS SEE ADMIN INSTRUCTIONS
Status: DISCONTINUED | OUTPATIENT
Start: 2020-02-05 | End: 2020-02-05 | Stop reason: HOSPADM

## 2020-02-05 RX ORDER — SIMVASTATIN 10 MG
20 TABLET ORAL AT BEDTIME
Status: DISCONTINUED | OUTPATIENT
Start: 2020-02-05 | End: 2020-02-11 | Stop reason: HOSPADM

## 2020-02-05 RX ORDER — PROPOFOL 10 MG/ML
INJECTION, EMULSION INTRAVENOUS PRN
Status: DISCONTINUED | OUTPATIENT
Start: 2020-02-05 | End: 2020-02-05

## 2020-02-05 RX ORDER — ONDANSETRON 4 MG/1
4 TABLET, ORALLY DISINTEGRATING ORAL EVERY 30 MIN PRN
Status: DISCONTINUED | OUTPATIENT
Start: 2020-02-05 | End: 2020-02-05 | Stop reason: HOSPADM

## 2020-02-05 RX ORDER — ALBUMIN, HUMAN INJ 5% 5 %
SOLUTION INTRAVENOUS CONTINUOUS PRN
Status: DISCONTINUED | OUTPATIENT
Start: 2020-02-05 | End: 2020-02-05

## 2020-02-05 RX ORDER — NALOXONE HYDROCHLORIDE 0.4 MG/ML
.1-.4 INJECTION, SOLUTION INTRAMUSCULAR; INTRAVENOUS; SUBCUTANEOUS
Status: DISCONTINUED | OUTPATIENT
Start: 2020-02-05 | End: 2020-02-11 | Stop reason: HOSPADM

## 2020-02-05 RX ADMIN — LIDOCAINE HYDROCHLORIDE 60 MG: 20 INJECTION, SOLUTION INFILTRATION; PERINEURAL at 07:57

## 2020-02-05 RX ADMIN — SODIUM CHLORIDE, POTASSIUM CHLORIDE, SODIUM LACTATE AND CALCIUM CHLORIDE: 600; 310; 30; 20 INJECTION, SOLUTION INTRAVENOUS at 13:50

## 2020-02-05 RX ADMIN — PHENYLEPHRINE HYDROCHLORIDE 100 MCG: 10 INJECTION INTRAVENOUS at 10:29

## 2020-02-05 RX ADMIN — PHENYLEPHRINE HYDROCHLORIDE 100 MCG: 10 INJECTION INTRAVENOUS at 14:08

## 2020-02-05 RX ADMIN — SUGAMMADEX 200 MG: 100 INJECTION, SOLUTION INTRAVENOUS at 15:33

## 2020-02-05 RX ADMIN — Medication: at 16:49

## 2020-02-05 RX ADMIN — BUPIVACAINE 20 ML: 13.3 INJECTION, SUSPENSION, LIPOSOMAL INFILTRATION at 07:22

## 2020-02-05 RX ADMIN — ALBUMIN (HUMAN): 12.5 SOLUTION INTRAVENOUS at 14:37

## 2020-02-05 RX ADMIN — Medication 10 MG: at 10:36

## 2020-02-05 RX ADMIN — PHENYLEPHRINE HYDROCHLORIDE 100 MCG: 10 INJECTION INTRAVENOUS at 14:23

## 2020-02-05 RX ADMIN — BUPIVACAINE HYDROCHLORIDE 40 ML: 2.5 INJECTION, SOLUTION EPIDURAL; INFILTRATION; INTRACAUDAL; PERINEURAL at 07:22

## 2020-02-05 RX ADMIN — PHENYLEPHRINE HYDROCHLORIDE 100 MCG: 10 INJECTION INTRAVENOUS at 10:03

## 2020-02-05 RX ADMIN — SODIUM CHLORIDE, POTASSIUM CHLORIDE, SODIUM LACTATE AND CALCIUM CHLORIDE: 600; 310; 30; 20 INJECTION, SOLUTION INTRAVENOUS at 09:16

## 2020-02-05 RX ADMIN — SODIUM CHLORIDE, POTASSIUM CHLORIDE, SODIUM LACTATE AND CALCIUM CHLORIDE: 600; 310; 30; 20 INJECTION, SOLUTION INTRAVENOUS at 07:48

## 2020-02-05 RX ADMIN — SUFENTANIL CITRATE 0.5 MCG/KG/HR: 50 INJECTION EPIDURAL; INTRAVENOUS at 09:00

## 2020-02-05 RX ADMIN — CEFOXITIN SODIUM 1 G: 2 POWDER, FOR SOLUTION INTRAVENOUS at 10:34

## 2020-02-05 RX ADMIN — ALBUMIN HUMAN 12.5 G: 0.05 INJECTION, SOLUTION INTRAVENOUS at 16:56

## 2020-02-05 RX ADMIN — PROPOFOL 240 MG: 10 INJECTION, EMULSION INTRAVENOUS at 07:57

## 2020-02-05 RX ADMIN — ESMOLOL HYDROCHLORIDE 20 MG: 10 INJECTION, SOLUTION INTRAVENOUS at 15:52

## 2020-02-05 RX ADMIN — SODIUM CHLORIDE, POTASSIUM CHLORIDE, SODIUM LACTATE AND CALCIUM CHLORIDE: 600; 310; 30; 20 INJECTION, SOLUTION INTRAVENOUS at 08:15

## 2020-02-05 RX ADMIN — SUFENTANIL CITRATE 0.3 MCG/KG/HR: 50 INJECTION EPIDURAL; INTRAVENOUS at 11:52

## 2020-02-05 RX ADMIN — PHENYLEPHRINE HYDROCHLORIDE 0.3 MCG/KG/MIN: 10 INJECTION INTRAVENOUS at 10:35

## 2020-02-05 RX ADMIN — PHENYLEPHRINE HYDROCHLORIDE 200 MCG: 10 INJECTION INTRAVENOUS at 14:27

## 2020-02-05 RX ADMIN — ROCURONIUM BROMIDE 10 MG: 10 INJECTION INTRAVENOUS at 13:11

## 2020-02-05 RX ADMIN — FENTANYL CITRATE 50 MCG: 50 INJECTION, SOLUTION INTRAMUSCULAR; INTRAVENOUS at 14:02

## 2020-02-05 RX ADMIN — SODIUM CHLORIDE, POTASSIUM CHLORIDE, SODIUM LACTATE AND CALCIUM CHLORIDE: 600; 310; 30; 20 INJECTION, SOLUTION INTRAVENOUS at 12:15

## 2020-02-05 RX ADMIN — FENTANYL CITRATE 100 MCG: 50 INJECTION, SOLUTION INTRAMUSCULAR; INTRAVENOUS at 07:57

## 2020-02-05 RX ADMIN — ESMOLOL HYDROCHLORIDE 40 MG: 10 INJECTION, SOLUTION INTRAVENOUS at 15:55

## 2020-02-05 RX ADMIN — PHENYLEPHRINE HYDROCHLORIDE 100 MCG: 10 INJECTION INTRAVENOUS at 12:25

## 2020-02-05 RX ADMIN — ONDANSETRON 4 MG: 2 INJECTION INTRAMUSCULAR; INTRAVENOUS at 15:23

## 2020-02-05 RX ADMIN — CEFOXITIN SODIUM 1 G: 2 POWDER, FOR SOLUTION INTRAVENOUS at 14:33

## 2020-02-05 RX ADMIN — PHENYLEPHRINE HYDROCHLORIDE 200 MCG: 10 INJECTION INTRAVENOUS at 14:17

## 2020-02-05 RX ADMIN — PHENYLEPHRINE HYDROCHLORIDE 100 MCG: 10 INJECTION INTRAVENOUS at 10:17

## 2020-02-05 RX ADMIN — PHENYLEPHRINE HYDROCHLORIDE 200 MCG: 10 INJECTION INTRAVENOUS at 14:32

## 2020-02-05 RX ADMIN — HYDROMORPHONE HYDROCHLORIDE 0.2 MG: 1 INJECTION, SOLUTION INTRAMUSCULAR; INTRAVENOUS; SUBCUTANEOUS at 14:05

## 2020-02-05 RX ADMIN — ALBUMIN (HUMAN): 12.5 SOLUTION INTRAVENOUS at 12:15

## 2020-02-05 RX ADMIN — PHENYLEPHRINE HYDROCHLORIDE 200 MCG: 10 INJECTION INTRAVENOUS at 13:00

## 2020-02-05 RX ADMIN — CEFOXITIN SODIUM 2 G: 2 POWDER, FOR SOLUTION INTRAVENOUS at 08:30

## 2020-02-05 RX ADMIN — CEFOXITIN SODIUM 1 G: 2 POWDER, FOR SOLUTION INTRAVENOUS at 12:33

## 2020-02-05 RX ADMIN — ESMOLOL HYDROCHLORIDE 30 MG: 10 INJECTION, SOLUTION INTRAVENOUS at 15:47

## 2020-02-05 RX ADMIN — ROCURONIUM BROMIDE 20 MG: 10 INJECTION INTRAVENOUS at 12:26

## 2020-02-05 RX ADMIN — FENTANYL CITRATE 50 MCG: 50 INJECTION, SOLUTION INTRAMUSCULAR; INTRAVENOUS at 14:45

## 2020-02-05 RX ADMIN — HYDROMORPHONE HYDROCHLORIDE 0.3 MG: 1 INJECTION, SOLUTION INTRAMUSCULAR; INTRAVENOUS; SUBCUTANEOUS at 13:50

## 2020-02-05 RX ADMIN — SODIUM CHLORIDE, POTASSIUM CHLORIDE, SODIUM LACTATE AND CALCIUM CHLORIDE: 600; 310; 30; 20 INJECTION, SOLUTION INTRAVENOUS at 20:54

## 2020-02-05 RX ADMIN — PHENYLEPHRINE HYDROCHLORIDE 100 MCG: 10 INJECTION INTRAVENOUS at 10:12

## 2020-02-05 RX ADMIN — Medication 15 MG: at 13:25

## 2020-02-05 RX ADMIN — ROCURONIUM BROMIDE 10 MG: 10 INJECTION INTRAVENOUS at 14:26

## 2020-02-05 RX ADMIN — FENTANYL CITRATE 50 MCG: 50 INJECTION, SOLUTION INTRAMUSCULAR; INTRAVENOUS at 08:32

## 2020-02-05 RX ADMIN — ENOXAPARIN SODIUM 40 MG: 40 INJECTION SUBCUTANEOUS at 07:40

## 2020-02-05 RX ADMIN — MAGNESIUM SULFATE 2 G: 2 INJECTION INTRAVENOUS at 18:23

## 2020-02-05 RX ADMIN — ROCURONIUM BROMIDE 100 MG: 10 INJECTION INTRAVENOUS at 07:57

## 2020-02-05 ASSESSMENT — MIFFLIN-ST. JEOR: SCORE: 1953.88

## 2020-02-05 ASSESSMENT — ACTIVITIES OF DAILY LIVING (ADL): ADLS_ACUITY_SCORE: 13

## 2020-02-05 NOTE — CONSULTS
SURGICAL ICU ADMISSION NOTE  2/5/2020    PRIMARY TEAM: Surgery  PRIMARY PHYSICIAN: Dr. Lele Luong    REASON FOR CRITICAL CARE ADMISSION: Hemodynamic monitoring   ADMITTING PHYSICIAN: Dr. Powell.    ASSESSMENT: Ishan Arroyo is a 59 y.o. male patient with a history of rectosigmoid cancer s/p resection with synchronous liver metastases (completed chemotherapy) who was found to have two hepatic lesions. He underwent diagnostic laparoscopy and open right hepatectomy by Hilda Luong and Benson on 2/5. EBL was 500ml. Patient was extubated in the OR. While in the PACU, patient became tachycardiac at 130's, 's/60's. He was transferred to SICU for close hemodynamic monitoring.     PLAN:  Neuro/ pain/ sedation:  #Post-operative pain  - Monitor neurological status. Notify the MD for any acute changes in exam.  - Pain: Dilaudid PCA. TAP block with Exparel.     Pulmonary care:   #Bibasilar atelectasis 2/2 operation  - Supplemental oxygen to keep saturation above 92%.  - Encourage incentive spirometry     Cardiovascular:    #HTN, HLD, Obesity  #Childhood-onset murmur  - Monitor hemodynamic status.   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 Appreciate recs    Renal/ Fluid Balance:    #H/o nephrolithiasis   Cr baseline 0.55  - Urine output adquate  - Will monitor intake and output.  - Bolus with albumin only per above  - Wood to stay in until POD 1    Endocrine:    #Stress hyperglycemia  - Low dose sliding  scale insulin    ID/ Antibiotics:  - No indication for antibiotics.  - Monitor for fevers and leukocytosis  - Daily WBC     Heme:     #H/o iron deficiency anemia Hgb baseline 13-15  - CBC qAM    Prophylaxis:    - Mechanical prophylaxis for DVT.  - Lovenox to start POD 1 providing no evidence of bleeding     MSK:    - PT and OT consulted. Appreciate recs.    Lines/ tubes/ drains:  - Art line R radial, PIV x2, Right chest Port-a-Cath, Wood  - SUMMER drain x1    Disposition:  - Surgical ICU.    Patient seen, findings and plan discussed with surgical ICU staff, Dr. Powell.     Asher Milligan MD  Surgery Resident PGY-2    - - - - - - - - - - - - - - - - - - - - - - - - - - - - - - - - - - - - - - - - - - - - - - - - - - - - - - - - - - - - - - - - - - - - - - - -     HISTORY PRESENTING ILLNESS: Ishan Arroyo is a 59 y.o. male patient with a history of rectosigmoid cancer s/p resection with synchronous liver metastases (completed chemotherapy - FOLFOX cycles + panitumumab) who was found to have two hepatic lesions. He underwent diagnostic laparoscopy and open right hepatectomy by Hilda Luong and Benson on 2/5. EBL was 500ml. Patient was extubated in the OR. While in the PACU, patient became tachycardiac at 130's, 's/60's. He was transferred to SICU for close hemodynamic monitoring.     REVIEW OF SYSTEMS: 10 point ROS neg other than the symptoms noted above in the HPI.    PAST MEDICAL HISTORY: Rectosigmoid cancer with metastasis to the liver, HTN, HLD, anemia (iron deficiency), nephrolithiasis     SURGICAL HISTORY: Colectomy, IR embolization, tonsillectomy    SOCIAL HISTORY: Occasional chewing tobacco user, never smoker, negligible EtOH use     FAMILY HISTORY: No bleeding/clotting disorders nor problems with anesthesia.     ALLERGIES:    No Known Allergies    MEDICATIONS:  No current facility-administered medications on file prior to encounter.   acetaminophen (TYLENOL) 500 MG tablet, Take 500-1,000 mg by mouth  every 6 hours as needed for mild pain (Pt last took 11/6/19. Pt last dose 3 weeks ago 1/17/20)   metoprolol succinate ER (TOPROL-XL) 100 MG 24 hr tablet, Take 100 mg by mouth At Bedtime   Multiple Vitamins-Minerals (MULTI VITAMIN  /MINERALS) TABS, Take 1 tablet by mouth every morning   simvastatin (ZOCOR) 20 MG tablet, Take 20 mg by mouth At Bedtime   lidocaine-prilocaine (EMLA) 2.5-2.5 % external cream, Apply topically as needed (Pt. has not used since August 2019. 11/8/19) Pt has not used in since 12/30/19    PHYSICAL EXAMINATION:  Temp:  [98  F (36.7  C)-99.6  F (37.6  C)] 98.4  F (36.9  C)  Pulse:  [] 121  Heart Rate:  [] 118  Resp:  [13-19] 13  BP: (106-150)/(73-99) 126/81  MAP:  [51 mmHg-103 mmHg] 99 mmHg  Arterial Line BP: ()/(42-83) 131/79  SpO2:  [95 %-99 %] 95 %    General: alert and oriented x4. Not in acute distress, pleasant   HEENT: forehead skin exam normal, pupils equally reactive to light  Pulm/Resp: Clear breath sounds bilaterally without rhonchi, crackles or wheeze, breathing non-labored on nasal canula   CV: RRR, tachycardic, no murmurs  Abdomen: Soft, mildly tender to palpation lower quadrants, incision site not tender. One drain with serosanguinous output.   Incision: clean, dry, intact from midline to right in hockey stick configuration  MSK/Extremities: moving all extremities, peripheral pulses intact, calves, extremities well perfused.    LABS: Reviewed.   Arterial Blood Gases   Recent Labs   Lab 02/05/20  1452 02/05/20  1336   PH 7.41 7.37   PCO2 33* 38   PO2 108* 115*   HCO3 20* 22     Complete Blood Count   Recent Labs   Lab 02/05/20  1600 02/05/20  1452 02/05/20  1336 02/05/20  0636   WBC 15.0*  --   --   --    HGB 12.7* 11.1* 12.5* 14.7     --   --   --      Basic Metabolic Panel  Recent Labs   Lab 02/05/20  1600 02/05/20  1452 02/05/20  1336    141 140   POTASSIUM 4.3 3.4 3.9   CHLORIDE 111*  --   --    CO2 25  --   --    BUN 10  --   --    CR 0.55*  --    --    GLC 99 92 147*     Liver Function Tests  Recent Labs   Lab 02/05/20  1600   *   *   ALKPHOS 74   BILITOTAL 2.1*   ALBUMIN 3.0*   INR 1.28*     Pancreatic Enzymes  No lab results found in last 7 days.  Coagulation Profile  Recent Labs   Lab 02/05/20  1600   INR 1.28*       IMAGING:  Recent Results (from the past 24 hour(s))   XR Chest Port 1 View    Narrative    EXAM: XR CHEST PORT 1 VW  2/5/2020 4:14 PM     HISTORY: Eval for PTx       COMPARISON: CT abdomen pelvis 1/17/2020    FINDINGS:   Single frontal radiograph of the chest. Right chest Port-A-Cath tip  projects over the superior cavoatrial junction. Right upper abdominal  drain appears to project between the liver dome and the diaphragm.    The trachea is midline. The cardiovascular silhouette is within normal  limits.    No acute osseous abnormalities. Postsurgical changes of partial right  hepatectomy.    Low lung volumes. Bibasilar opacities, likely atelectasis. No  pneumothorax. No significant pleural effusion.        Impression    IMPRESSION:   1. No pneumothorax.  2. Bibasilar opacities, likely atelectasis.     I have personally reviewed the examination and initial interpretation  and I agree with the findings.    PAULINO NUNN MD

## 2020-02-05 NOTE — OR NURSING
Upon meeting Alin in pre-op he said he forgot his LMX (Emla) cream for his port-a cath. He said it really does hurt when accessed and would prefer to use the cream. He was OK with starting a peripheral IV and that is what we did pre-op.    Bilateral TAP block performed without complications.  VSS.  Pt tolerated well.  Will continue to monitor.

## 2020-02-05 NOTE — ANESTHESIA PROCEDURE NOTES
Arterial Line Procedure Note  Staff:     Anesthesiologist:  Dante Nix DO    Resident/CRNA:  Angelique Wilson MD    Arterial line performed by resident/CRNA in presence of a teaching physician    Location: In OR After Induction  Procedure Start/Stop Times:     patient identified, IV checked, site marked, risks and benefits discussed, informed consent, monitors and equipment checked, pre-op evaluation and at physician/surgeon's request      Correct Patient: Yes      Correct Position: Yes      Correct Site: Yes      Correct Procedure: Yes      Correct Laterality:  Yes    Site Marked:  N/A  Line Placement:     Procedure:  Arterial Line    Insertion Site:  Radial    Insertion laterality:  Right    Skin Prep: Chloraprep      Patient Prep: mask, sterile gloves, hat and hand hygiene      Local skin infiltration:  None    Ultrasound Guided?: Yes      Artery evaluated via ultrasound confirming patency.   Using realtime imaging, the artery was punctured and the needle was observed entering the artery.      A permanent image is entered into patient's chart.      Catheter size:  20 gauge, Quick cath    Cath secured with comment:  Tegaderm and benzoin    Dressing:  Tegaderm    Complications:  None obvious    Arterial waveform: Yes      IBP within 10% of NIBP: Yes

## 2020-02-05 NOTE — BRIEF OP NOTE
[facility], [place]    Brief Operative Note    Pre-operative diagnosis: Metastatic colon cancer to liver (H) [C18.9, C78.7]  Post-operative diagnosis Same as pre-operative diagnosis    Procedure: Procedure(s):  open right hepatectomy  intra-operative liver ultrasound  Surgeon: Surgeon(s) and Role:     * Lele Luong MD - Primary     * Darrion Knowles MD - Assisting     * Bertrand Salmon MD - Resident - Assisting  Anesthesia: General   Estimated blood loss: 500cc  Drains: Shiva-Packer  Specimens:   ID Type Source Tests Collected by Time Destination   A : gallbladder Organ Gallbladder and Contents SURGICAL PATHOLOGY EXAM Lele Luong MD 2/5/2020 10:16 AM    B : liver segment 5, 6, 7, & 8 Tissue Liver SURGICAL PATHOLOGY EXAM Lele Luong MD 2/5/2020  1:17 PM      Findings:   Right hepatectomy, metastatic lesions difficult to identify with US.  Complications: None.  Implants: * No implants in log *      Bertrand Salmon MD PGY-5  Surgery Resident  Pg [phone]

## 2020-02-05 NOTE — ANESTHESIA PROCEDURE NOTES
Peripheral Nerve Block Procedure Note    Staff:     Anesthesiologist:  Pasquale Liriano MD    Resident/CRNA:  Jammie Eckert MD  Location: Pre-op  Procedure Start/Stop TImes:      2/5/2020 7:15 AM     2/5/2020 7:25 AM    patient identified, IV checked, site marked, risks and benefits discussed, informed consent, monitors and equipment checked, pre-op evaluation, at physician/surgeon's request and post-op pain management      Correct Patient: Yes      Correct Position: Yes      Correct Site: Yes      Correct Procedure: Yes      Correct Laterality:  Yes    Site Marked:  Yes  Procedure details:     Procedure:  TAP    ASA:  2    Laterality:  Bilateral    Position:  Supine    Needle:  Touhy needle    Ultrasound: Yes      Ultrasound used to identify targeted nerve, plexus, or vascular structure and placed a needle adjacent to it      Permanent Image entered into patiient's record      Abnormal pain on injection: No      Blood Aspirated: No      Paresthesias:  No    Bleeding at site: No      Bolus via:  Needle    Infusion Method:  Single Shot    Blood aspirated via catheter: No      Complications:  None  Assessment/Narrative:     Injection made incrementally with aspirations every (mL):  5     4 quadrant TAPS

## 2020-02-05 NOTE — ANESTHESIA CARE TRANSFER NOTE
Patient: Ishan Arroyo    Procedure(s):  open right hepatectomy  intra-operative liver ultrasound    Diagnosis: Metastatic colon cancer to liver (H) [C18.9, C78.7]  Diagnosis Additional Information: No value filed.    Anesthesia Type:   General     Note:  Airway :Face Mask  Patient transferred to:PACU  Comments: Pt denies pain or nausea. Report given to RN. Handoff Report: Identifed the Patient, Identified the Reponsible Provider, Reviewed the pertinent medical history, Discussed the surgical course, Reviewed Intra-OP anesthesia mangement and issues during anesthesia, Set expectations for post-procedure period and Allowed opportunity for questions and acknowledgement of understanding      Vitals: (Last set prior to Anesthesia Care Transfer)    CRNA VITALS  2/5/2020 1513 - 2/5/2020 1554      2/5/2020             Pulse:  107                Electronically Signed By: STEVE Mc CRNA  February 5, 2020  3:54 PM

## 2020-02-05 NOTE — PROGRESS NOTES
SPIRITUAL HEALTH SERVICES  [facility]C   PRE-SURGERY VISIT    Had pre-surgery visit with pt./family. Provided spiritual support, prayer.     Sabino Anderson M.Div.     Pager [phone]

## 2020-02-05 NOTE — ANESTHESIA POSTPROCEDURE EVALUATION
Anesthesia POST Procedure Evaluation    Patient: Ishan Arroyo   MRN:     [id number] Gender:   male   Age:    59 year old :      1960        Preoperative Diagnosis: Metastatic colon cancer to liver (H) [C18.9, C78.7]   Procedure(s):  open right hepatectomy  intra-operative liver ultrasound   Postop Comments: No value filed.       Anesthesia Type:  Not documented  General    Reportable Event: NO     PAIN: Uncomplicated   Sign Out status: Comfortable, Well controlled pain     PONV: No PONV   Sign Out status:  No Nausea or Vomiting     Neuro/Psych: Uneventful perioperative course   Sign Out Status: Preoperative baseline; Age appropriate mentation     Airway/Resp.: Uneventful perioperative course   Sign Out Status: Non labored breathing, age appropriate RR; Resp. Status within EXPECTED Parameters     CV: Uneventful perioperative course   Sign Out status: Appropriate BP and perfusion indices; Appropriate HR/Rhythm     Disposition:   Sign Out in:  PACU  Disposition:  ICU  Recovery Course: Uneventful  Follow-Up: Not required           Last Anesthesia Record Vitals:  CRNA VITALS  2020 1513 - 2020 1613      2020             Pulse:  107          Last PACU Vitals:  Vitals Value Taken Time   /92 2020  5:20 PM   Temp 36.8  C (98.2  F) 2020  5:15 PM   Pulse 123 2020  5:20 PM   Resp 16 2020  5:15 PM   SpO2 95 % 2020  5:27 PM   Temp src     NIBP     Pulse 107 2020  3:52 PM   SpO2     Resp     Temp     Ht Rate     Temp 2     Vitals shown include unvalidated device data.      Electronically Signed By: Darlin Alanis MD, 2020, 5:28 PM

## 2020-02-05 NOTE — OR NURSING
Pt tachycardic at 130's, 's/60's. Darlin AYALA notified and stated that she does not want to give anything for blood pressure at this moment d/t low BP and that the team would like to keep him dry so to page the team as to what to do/give him.     Following message was sent to Luis Antonio:    Ishan Arroyo PACU: pt tachy at 130, /63. Darlin AYALA stated that you would like to keep him dry. would you like to give 250 of albumin? Dontrell RN [id number]    Luis Antonio FOX at bedside. Stated that he would like a troponin and EKG done on patient along with 250mL of albumin. Also pt will be going to 4A for closer monitoring overnight. Pt showing understanding, family updated.     Chip Felix RN given report.

## 2020-02-06 ENCOUNTER — APPOINTMENT (OUTPATIENT)
Dept: PHYSICAL THERAPY | Facility: CLINIC | Age: 60
DRG: 406 | End: 2020-02-06
Attending: SURGERY
Payer: COMMERCIAL

## 2020-02-06 ENCOUNTER — APPOINTMENT (OUTPATIENT)
Dept: CT IMAGING | Facility: CLINIC | Age: 60
DRG: 406 | End: 2020-02-06
Attending: SURGERY
Payer: COMMERCIAL

## 2020-02-06 LAB
ALBUMIN SERPL-MCNC: 3.2 G/DL (ref 3.4–5)
ALP SERPL-CCNC: 73 U/L (ref 40–150)
ALT SERPL W P-5'-P-CCNC: 416 U/L (ref 0–70)
ANION GAP SERPL CALCULATED.3IONS-SCNC: 2 MMOL/L (ref 3–14)
ANION GAP SERPL CALCULATED.3IONS-SCNC: 4 MMOL/L (ref 3–14)
AST SERPL W P-5'-P-CCNC: 507 U/L (ref 0–45)
BILIRUB SERPL-MCNC: 3.8 MG/DL (ref 0.2–1.3)
BUN SERPL-MCNC: 10 MG/DL (ref 7–30)
BUN SERPL-MCNC: 17 MG/DL (ref 7–30)
CALCIUM SERPL-MCNC: 8.2 MG/DL (ref 8.5–10.1)
CALCIUM SERPL-MCNC: 8.5 MG/DL (ref 8.5–10.1)
CHLORIDE SERPL-SCNC: 106 MMOL/L (ref 94–109)
CHLORIDE SERPL-SCNC: 109 MMOL/L (ref 94–109)
CO2 SERPL-SCNC: 26 MMOL/L (ref 20–32)
CO2 SERPL-SCNC: 28 MMOL/L (ref 20–32)
CREAT SERPL-MCNC: 0.48 MG/DL (ref 0.66–1.25)
CREAT SERPL-MCNC: 1.24 MG/DL (ref 0.66–1.25)
ERYTHROCYTE [DISTWIDTH] IN BLOOD BY AUTOMATED COUNT: 16.9 % (ref 10–15)
ERYTHROCYTE [DISTWIDTH] IN BLOOD BY AUTOMATED COUNT: 17.1 % (ref 10–15)
GFR SERPL CREATININE-BSD FRML MDRD: 64 ML/MIN/{1.73_M2}
GFR SERPL CREATININE-BSD FRML MDRD: >90 ML/MIN/{1.73_M2}
GLUCOSE BLDC GLUCOMTR-MCNC: 105 MG/DL (ref 70–99)
GLUCOSE BLDC GLUCOMTR-MCNC: 120 MG/DL (ref 70–99)
GLUCOSE BLDC GLUCOMTR-MCNC: 122 MG/DL (ref 70–99)
GLUCOSE BLDC GLUCOMTR-MCNC: 141 MG/DL (ref 70–99)
GLUCOSE BLDC GLUCOMTR-MCNC: 142 MG/DL (ref 70–99)
GLUCOSE BLDC GLUCOMTR-MCNC: 146 MG/DL (ref 70–99)
GLUCOSE BLDC GLUCOMTR-MCNC: 149 MG/DL (ref 70–99)
GLUCOSE SERPL-MCNC: 129 MG/DL (ref 70–99)
GLUCOSE SERPL-MCNC: 155 MG/DL (ref 70–99)
HCT VFR BLD AUTO: 42.3 % (ref 40–53)
HCT VFR BLD AUTO: 42.5 % (ref 40–53)
HGB BLD-MCNC: 13.6 G/DL (ref 13.3–17.7)
HGB BLD-MCNC: 13.6 G/DL (ref 13.3–17.7)
INR PPP: 1.47 (ref 0.86–1.14)
INR PPP: 1.69 (ref 0.86–1.14)
INTERPRETATION ECG - MUSE: NORMAL
LACTATE BLD-SCNC: 2.2 MMOL/L (ref 0.7–2)
MAGNESIUM SERPL-MCNC: 2 MG/DL (ref 1.6–2.3)
MAGNESIUM SERPL-MCNC: 2 MG/DL (ref 1.6–2.3)
MCH RBC QN AUTO: 29.1 PG (ref 26.5–33)
MCH RBC QN AUTO: 29.7 PG (ref 26.5–33)
MCHC RBC AUTO-ENTMCNC: 32 G/DL (ref 31.5–36.5)
MCHC RBC AUTO-ENTMCNC: 32.2 G/DL (ref 31.5–36.5)
MCV RBC AUTO: 90 FL (ref 78–100)
MCV RBC AUTO: 93 FL (ref 78–100)
PHOSPHATE SERPL-MCNC: 2.7 MG/DL (ref 2.5–4.5)
PHOSPHATE SERPL-MCNC: 3.7 MG/DL (ref 2.5–4.5)
PLATELET # BLD AUTO: 204 10E9/L (ref 150–450)
PLATELET # BLD AUTO: 285 10E9/L (ref 150–450)
POTASSIUM SERPL-SCNC: 4.4 MMOL/L (ref 3.4–5.3)
POTASSIUM SERPL-SCNC: 4.9 MMOL/L (ref 3.4–5.3)
PROT SERPL-MCNC: 6.3 G/DL (ref 6.8–8.8)
RBC # BLD AUTO: 4.58 10E12/L (ref 4.4–5.9)
RBC # BLD AUTO: 4.68 10E12/L (ref 4.4–5.9)
SODIUM SERPL-SCNC: 136 MMOL/L (ref 133–144)
SODIUM SERPL-SCNC: 139 MMOL/L (ref 133–144)
TROPONIN I SERPL-MCNC: 0.03 UG/L (ref 0–0.04)
WBC # BLD AUTO: 19.4 10E9/L (ref 4–11)
WBC # BLD AUTO: 23.8 10E9/L (ref 4–11)

## 2020-02-06 PROCEDURE — 71275 CT ANGIOGRAPHY CHEST: CPT

## 2020-02-06 PROCEDURE — 25000132 ZZH RX MED GY IP 250 OP 250 PS 637: Performed by: SURGERY

## 2020-02-06 PROCEDURE — 85610 PROTHROMBIN TIME: CPT | Performed by: SURGERY

## 2020-02-06 PROCEDURE — 80048 BASIC METABOLIC PNL TOTAL CA: CPT | Performed by: STUDENT IN AN ORGANIZED HEALTH CARE EDUCATION/TRAINING PROGRAM

## 2020-02-06 PROCEDURE — 85610 PROTHROMBIN TIME: CPT | Performed by: STUDENT IN AN ORGANIZED HEALTH CARE EDUCATION/TRAINING PROGRAM

## 2020-02-06 PROCEDURE — 99231 SBSQ HOSP IP/OBS SF/LOW 25: CPT | Mod: GC | Performed by: ANESTHESIOLOGY

## 2020-02-06 PROCEDURE — 25000128 H RX IP 250 OP 636: Performed by: SURGERY

## 2020-02-06 PROCEDURE — P9047 ALBUMIN (HUMAN), 25%, 50ML: HCPCS | Performed by: STUDENT IN AN ORGANIZED HEALTH CARE EDUCATION/TRAINING PROGRAM

## 2020-02-06 PROCEDURE — 83735 ASSAY OF MAGNESIUM: CPT | Performed by: STUDENT IN AN ORGANIZED HEALTH CARE EDUCATION/TRAINING PROGRAM

## 2020-02-06 PROCEDURE — P9047 ALBUMIN (HUMAN), 25%, 50ML: HCPCS | Performed by: SURGERY

## 2020-02-06 PROCEDURE — 25000128 H RX IP 250 OP 636: Performed by: ANESTHESIOLOGY

## 2020-02-06 PROCEDURE — 93005 ELECTROCARDIOGRAM TRACING: CPT

## 2020-02-06 PROCEDURE — 85027 COMPLETE CBC AUTOMATED: CPT | Performed by: STUDENT IN AN ORGANIZED HEALTH CARE EDUCATION/TRAINING PROGRAM

## 2020-02-06 PROCEDURE — 40000014 ZZH STATISTIC ARTERIAL MONITORING DAILY

## 2020-02-06 PROCEDURE — 80053 COMPREHEN METABOLIC PANEL: CPT | Performed by: SURGERY

## 2020-02-06 PROCEDURE — 83605 ASSAY OF LACTIC ACID: CPT | Performed by: STUDENT IN AN ORGANIZED HEALTH CARE EDUCATION/TRAINING PROGRAM

## 2020-02-06 PROCEDURE — 25000128 H RX IP 250 OP 636

## 2020-02-06 PROCEDURE — 25000131 ZZH RX MED GY IP 250 OP 636 PS 637: Performed by: SURGERY

## 2020-02-06 PROCEDURE — 40000275 ZZH STATISTIC RCP TIME EA 10 MIN

## 2020-02-06 PROCEDURE — 84484 ASSAY OF TROPONIN QUANT: CPT | Performed by: STUDENT IN AN ORGANIZED HEALTH CARE EDUCATION/TRAINING PROGRAM

## 2020-02-06 PROCEDURE — 25800030 ZZH RX IP 258 OP 636: Performed by: ANESTHESIOLOGY

## 2020-02-06 PROCEDURE — 20000004 ZZH R&B ICU UMMC

## 2020-02-06 PROCEDURE — 84100 ASSAY OF PHOSPHORUS: CPT | Performed by: SURGERY

## 2020-02-06 PROCEDURE — 25800030 ZZH RX IP 258 OP 636: Performed by: STUDENT IN AN ORGANIZED HEALTH CARE EDUCATION/TRAINING PROGRAM

## 2020-02-06 PROCEDURE — 83735 ASSAY OF MAGNESIUM: CPT | Performed by: SURGERY

## 2020-02-06 PROCEDURE — 25800030 ZZH RX IP 258 OP 636: Performed by: SURGERY

## 2020-02-06 PROCEDURE — 25000128 H RX IP 250 OP 636: Performed by: STUDENT IN AN ORGANIZED HEALTH CARE EDUCATION/TRAINING PROGRAM

## 2020-02-06 PROCEDURE — 25000132 ZZH RX MED GY IP 250 OP 250 PS 637: Performed by: STUDENT IN AN ORGANIZED HEALTH CARE EDUCATION/TRAINING PROGRAM

## 2020-02-06 PROCEDURE — 85027 COMPLETE CBC AUTOMATED: CPT | Performed by: SURGERY

## 2020-02-06 PROCEDURE — 97162 PT EVAL MOD COMPLEX 30 MIN: CPT | Mod: GP

## 2020-02-06 PROCEDURE — 97530 THERAPEUTIC ACTIVITIES: CPT | Mod: GP

## 2020-02-06 PROCEDURE — 93010 ELECTROCARDIOGRAM REPORT: CPT | Mod: 76 | Performed by: INTERNAL MEDICINE

## 2020-02-06 PROCEDURE — 84100 ASSAY OF PHOSPHORUS: CPT | Performed by: STUDENT IN AN ORGANIZED HEALTH CARE EDUCATION/TRAINING PROGRAM

## 2020-02-06 PROCEDURE — 00000146 ZZHCL STATISTIC GLUCOSE BY METER IP

## 2020-02-06 RX ORDER — METOPROLOL TARTRATE 50 MG
50 TABLET ORAL 2 TIMES DAILY
Status: DISCONTINUED | OUTPATIENT
Start: 2020-02-06 | End: 2020-02-08

## 2020-02-06 RX ORDER — ALBUMIN (HUMAN) 12.5 G/50ML
12.5 SOLUTION INTRAVENOUS ONCE
Status: DISCONTINUED | OUTPATIENT
Start: 2020-02-06 | End: 2020-02-06

## 2020-02-06 RX ORDER — DIGOXIN 0.25 MG/ML
500 INJECTION INTRAMUSCULAR; INTRAVENOUS ONCE
Status: COMPLETED | OUTPATIENT
Start: 2020-02-06 | End: 2020-02-06

## 2020-02-06 RX ORDER — MAGNESIUM SULFATE HEPTAHYDRATE 40 MG/ML
2 INJECTION, SOLUTION INTRAVENOUS ONCE
Status: COMPLETED | OUTPATIENT
Start: 2020-02-06 | End: 2020-02-06

## 2020-02-06 RX ORDER — SODIUM CHLORIDE, SODIUM LACTATE, POTASSIUM CHLORIDE, CALCIUM CHLORIDE 600; 310; 30; 20 MG/100ML; MG/100ML; MG/100ML; MG/100ML
INJECTION, SOLUTION INTRAVENOUS CONTINUOUS
Status: DISCONTINUED | OUTPATIENT
Start: 2020-02-06 | End: 2020-02-06

## 2020-02-06 RX ORDER — METOPROLOL TARTRATE 1 MG/ML
INJECTION, SOLUTION INTRAVENOUS
Status: DISCONTINUED
Start: 2020-02-06 | End: 2020-02-07 | Stop reason: HOSPADM

## 2020-02-06 RX ORDER — ALBUMIN (HUMAN) 12.5 G/50ML
12.5 SOLUTION INTRAVENOUS ONCE
Status: COMPLETED | OUTPATIENT
Start: 2020-02-06 | End: 2020-02-06

## 2020-02-06 RX ORDER — IOPAMIDOL 755 MG/ML
73 INJECTION, SOLUTION INTRAVASCULAR ONCE
Status: COMPLETED | OUTPATIENT
Start: 2020-02-06 | End: 2020-02-06

## 2020-02-06 RX ADMIN — ALBUMIN HUMAN 12.5 G: 0.25 SOLUTION INTRAVENOUS at 22:14

## 2020-02-06 RX ADMIN — MAGNESIUM SULFATE 2 G: 2 INJECTION INTRAVENOUS at 18:54

## 2020-02-06 RX ADMIN — ENOXAPARIN SODIUM 40 MG: 40 INJECTION SUBCUTANEOUS at 08:23

## 2020-02-06 RX ADMIN — IOPAMIDOL 73 ML: 755 INJECTION, SOLUTION INTRAVENOUS at 21:13

## 2020-02-06 RX ADMIN — METOPROLOL TARTRATE 50 MG: 50 TABLET, FILM COATED ORAL at 08:23

## 2020-02-06 RX ADMIN — INSULIN ASPART 1 UNITS: 100 INJECTION, SOLUTION INTRAVENOUS; SUBCUTANEOUS at 03:54

## 2020-02-06 RX ADMIN — DIGOXIN 500 MCG: 0.25 INJECTION INTRAMUSCULAR; INTRAVENOUS at 20:12

## 2020-02-06 RX ADMIN — SODIUM CHLORIDE, POTASSIUM CHLORIDE, SODIUM LACTATE AND CALCIUM CHLORIDE: 600; 310; 30; 20 INJECTION, SOLUTION INTRAVENOUS at 06:15

## 2020-02-06 RX ADMIN — AMIODARONE HYDROCHLORIDE 150 MG: 1.5 INJECTION, SOLUTION INTRAVENOUS at 18:55

## 2020-02-06 RX ADMIN — METOPROLOL TARTRATE 50 MG: 50 TABLET, FILM COATED ORAL at 19:49

## 2020-02-06 RX ADMIN — SODIUM CHLORIDE, POTASSIUM CHLORIDE, SODIUM LACTATE AND CALCIUM CHLORIDE: 600; 310; 30; 20 INJECTION, SOLUTION INTRAVENOUS at 12:17

## 2020-02-06 RX ADMIN — DEXTROSE AND SODIUM CHLORIDE: 5; 900 INJECTION, SOLUTION INTRAVENOUS at 16:33

## 2020-02-06 RX ADMIN — DEXTROSE AND SODIUM CHLORIDE: 5; 900 INJECTION, SOLUTION INTRAVENOUS at 08:24

## 2020-02-06 RX ADMIN — INSULIN ASPART 1 UNITS: 100 INJECTION, SOLUTION INTRAVENOUS; SUBCUTANEOUS at 20:22

## 2020-02-06 RX ADMIN — SODIUM CHLORIDE 1000 ML: 9 INJECTION, SOLUTION INTRAVENOUS at 18:11

## 2020-02-06 RX ADMIN — AMIODARONE HYDROCHLORIDE 1 MG/MIN: 50 INJECTION, SOLUTION INTRAVENOUS at 19:27

## 2020-02-06 RX ADMIN — SIMVASTATIN 20 MG: 20 TABLET, FILM COATED ORAL at 21:54

## 2020-02-06 RX ADMIN — ALBUMIN HUMAN 12.5 G: 0.25 SOLUTION INTRAVENOUS at 19:45

## 2020-02-06 RX ADMIN — SODIUM CHLORIDE 1000 ML: 9 INJECTION, SOLUTION INTRAVENOUS at 15:59

## 2020-02-06 ASSESSMENT — ACTIVITIES OF DAILY LIVING (ADL)
ADLS_ACUITY_SCORE: 17
ADLS_ACUITY_SCORE: 15
ADLS_ACUITY_SCORE: 16
ADLS_ACUITY_SCORE: 15
ADLS_ACUITY_SCORE: 16
ADLS_ACUITY_SCORE: 16

## 2020-02-06 ASSESSMENT — PAIN DESCRIPTION - DESCRIPTORS
DESCRIPTORS: ACHING;SHARP
DESCRIPTORS: ACHING;SHARP
DESCRIPTORS: ACHING

## 2020-02-06 NOTE — PROGRESS NOTES
02/06/20 1000   Quick Adds   Type of Visit Initial PT Evaluation  (Rosario Mazariegos, PT, DPT )       Present no   Living Environment   Lives With alone   Living Arrangements house  (2 story with a basement )   Home Accessibility stairs to enter home;stairs within home   Number of Stairs, Main Entrance 4   Stair Railings, Main Entrance railings on both sides of stairs   Number of Stairs, Within Home, Primary other (see comments)  (full flight )   Stair Railings, Within Home, Primary railing on right side (ascending)   Transportation Anticipated car, drives self   Living Environment Comment Pt lives alone in a 2 story home. all needs can be met on main level if needed. 4 ARIADNE with deilo handrail.    Self-Care   Usual Activity Tolerance good   Current Activity Tolerance moderate   Regular Exercise No   Equipment Currently Used at Home none   Activity/Exercise/Self-Care Comment  - FT. indep with ADLs at baseline    Functional Level Prior   Ambulation 0-->independent   Transferring 0-->independent   Toileting 0-->independent   Bathing 0-->independent   Communication 0-->understands/communicates without difficulty   Swallowing 0-->swallows foods/liquids without difficulty   Cognition 0 - no cognition issues reported   Fall history within last six months no   Which of the above functional risks had a recent onset or change? ambulation;transferring   Prior Functional Level Comment Indepent prior to sx    General Information   Onset of Illness/Injury or Date of Surgery - Date 02/05/20   Referring Physician Bertrand Salmon MD   Patient/Family Goals Statement return home    Pertinent History of Current Problem (include personal factors and/or comorbidities that impact the POC) 59 year old male hx of rectal cancer with liver metastases POD#1 s/p open right hepatectomy   Precautions/Limitations fall precautions;abdominal precautions   General Info Comments activity: up with A    Cognitive Status  Examination   Orientation orientation to person, place and time   Level of Consciousness alert   Follows Commands and Answers Questions 100% of the time   Personal Safety and Judgment intact   Memory intact   Pain Assessment   Patient Currently in Pain Yes, see Vital Sign flowsheet  (R side 8/10 center 4/5 )   Integumentary/Edema   Integumentary/Edema no deficits were identifed   Posture    Posture Forward head position;Protracted shoulders   Range of Motion (ROM)   ROM Comment WFL throughout   Strength   Strength Comments at least 3/5. UE 5/5. did not assess LE dt abd discomfort post operatively    Bed Mobility   Bed Mobility Comments HOB fully elevated, swivels over to EOB    Transfer Skills   Transfer Comments STS/SPT with CGA. slow and guraded   Gait   Gait Comments not formally assessed today, pt deferred dt abd discomfort    Balance   Balance Comments not formally assessed, at risk dt pain and post op weakness   Sensory Examination   Sensory Perception no deficits were identified   Coordination   Coordination no deficits were identified   Muscle Tone   Muscle Tone no deficits were identified   General Therapy Interventions   Planned Therapy Interventions ADL retraining;balance training;bed mobility training;gait training;strengthening;transfer training;progressive activity/exercise;home program guidelines;risk factor education   Clinical Impression   Criteria for Skilled Therapeutic Intervention yes, treatment indicated   PT Diagnosis dec functional mobility   Influenced by the following impairments pain, fatigue, deconditioning   Functional limitations due to impairments bed mobility, transfers, gait, stairs, activity tolerance, balance    Clinical Presentation Stable/Uncomplicated   Clinical Presentation Rationale PMH, clinical kidgement, current mobility    Clinical Decision Making (Complexity) Moderate complexity   Therapy Frequency 6x/week   Predicted Duration of Therapy Intervention (days/wks) 1 week  "  Anticipated Equipment Needs at Discharge   (TBD)   Anticipated Discharge Disposition Home with Assist  (pending )   Risk & Benefits of therapy have been explained Yes   Patient, Family & other staff in agreement with plan of care Yes   Clinical Impression Comments PT jesu completed, tx indicated    [facility] AM-PAC TM \"6 Clicks\"   2016, Trustees of [facility], under license to [employer].  All rights reserved.   6 Clicks Short Forms Basic Mobility Inpatient Short Form   [facility] AM-PAC  \"6 Clicks\" V.2 Basic Mobility Inpatient Short Form   1. Turning from your back to your side while in a flat bed without using bedrails? 3 - A Little   2. Moving from lying on your back to sitting on the side of a flat bed without using bedrails? 2 - A Lot   3. Moving to and from a bed to a chair (including a wheelchair)? 3 - A Little   4. Standing up from a chair using your arms (e.g., wheelchair, or bedside chair)? 3 - A Little   5. To walk in hospital room? 3 - A Little   6. Climbing 3-5 steps with a railing? 2 - A Lot   Basic Mobility Raw Score (Score out of 24.Lower scores equate to lower levels of function) 16   Total Evaluation Time   Total Evaluation Time (Minutes) 8     "

## 2020-02-06 NOTE — PROGRESS NOTES
Surgery Progress Note    S: No issues overnight. Still tachycardic to 130s. Tele with some ST changes. ECG pending to eval further. BPs normal. Pain controlled. Denies cp, sob, n/v. Passing flatus. No BM. Adequate uop.    O:  Vital signs:  Temp: 98.1  F (36.7  C) Temp src: Axillary BP: 126/81 Pulse: 120 Heart Rate: 121 Resp: 16 SpO2: 92 % O2 Device: None (Room air) Oxygen Delivery: 2 LPM    NAD  tachycardic  CTAB  Soft, NT, ND, incision c/d/i; drain serosang    uop 1715/600cc  /140cc      A/p:  Ishan Arroyo is a 59 year old male hx of rectal cancer with liver metastases POD#1 s/p open right hepatectomy    -repeat ECG  -clears  -home metoprolol  -dPCA  -mIVF  -monitor uop, anticipate benavides out if ECG normal and tachycardia improves with metoprolol  -PT/OT  -lovenox  -anticipate transfer to floor if ECG isn't worrisome for cardiac process    Bertrand Salmon MD PGY-5  Surgery Resident

## 2020-02-06 NOTE — PROGRESS NOTES
Major Shift Events:  Lethargic overnight, oriented x 4. Sinus tach in 120's. BP stable. Room air. NPO besides ice chips, tolerating well. Wood in place, adequate urine output. SUMMER putting out 30-50 Q2H. Dilaudid PCA @ 0.2 Q10min x 6, pain well controlled.   Plan: Transfer out of ICU when bed available? Continue with plan of care.  For vital signs and complete assessments, please see documentation flowsheets.

## 2020-02-06 NOTE — PLAN OF CARE
4A Holding OT. Limited by pain today, likely to only need one discipline during IP stay. PT to follow.

## 2020-02-06 NOTE — PLAN OF CARE
Discharge Planner PT   Patient plan for discharge: home     Current status: PT eval completed, tx indicated. Pt educated on log roll/safe body mechanics. He completed bed mobility with mod A for trunk management. Sits at EOB with supervision. Slightly dizzy upon sitting up but resolved with time. Completed STS and stepping pivot transfer with CGA. Co abd discomfort with mobility > on R. AVSS on RA.     Barriers to return to prior living situation: medical needs, current mobility, level of A     Recommendations for discharge: anticipate with time, pt will progress to dc home with prn support - will update as appropriate.     Rationale for recommendations: pt is POD 1. Limited by abd discomfort and dizziness today. He would benefit from ongoing PT to continue to safely progress functional mobility toward baseline.       Entered by: Rosario Mazariegos 02/06/2020 11:20 AM

## 2020-02-06 NOTE — PROGRESS NOTES
SURGICAL ICU ADMISSION NOTE  2/6/2020    PRIMARY TEAM: Surgical Oncology  PRIMARY PHYSICIAN: Dr. Luong    REASON FOR CRITICAL CARE ADMISSION: Hemodynamic monitoring   ADMITTING PHYSICIAN: Dr. Powell    ASSESSMENT: Ishan Arroyo is a 59 y.o. male patient with a history of rectosigmoid cancer s/p resection with synchronous liver metastases (completed chemotherapy) who was found to have two hepatic lesions. He underwent diagnostic laparoscopy and open right hepatectomy by Hilda Luong and Benson on 2/5. EBL was 500ml. Patient was extubated in the OR. While in the PACU, patient became tachycardiac at 130's, 's/60's. He was transferred to SICU for close hemodynamic monitoring.     Changes/Plan for today:  - Discontinue a-line  - CLD  - LR @ 125 ml/hr  - Lovenox  - Transfer to floor status today     PLAN:   Neuro/ pain/ sedation:  #Post-operative pain  - Monitor neurological status. Notify the MD for any acute changes in exam.  - Pain: Dilaudid PCA. TAP block with Exparel.      Pulmonary care:   #Bibasilar atelectasis 2/2 operation  - Supplemental oxygen to keep saturation above 92%.  - Encourage incentive spirometry      Cardiovascular:    #HTN, HLD, Obesity  #Childhood-onset murmur  - Monitor hemodynamic status.   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  - Monitor for fevers and leukocytosis  - Daily WBC      Heme:     #H/o iron deficiency anemia Hgb baseline 13-15  - CBC qAM     Prophylaxis:    - Mechanical prophylaxis for DVT.  - Lovenox 40 mg daily     MSK:    - PT and OT consulted. Appreciate recs.     Lines/ tubes/ drains:  - PIV x2, Right chest Port-a-Cath, Benavides  - SUMMER drain x1     Disposition:  - Surgical ICU.    Patient seen, findings and plan discussed with surgical ICU staff, Dr. Elder.    Kimberli Gloria MD  Surgery Resident, PGY2    - - - - - - - - - - - - - - - - - - - - - - - - - - - - - - - - - - - - - - - - - - - - - - - - - - - - - - - - - - - - - - - - - - - - - - - -     SUBJECTIVE: Patient doing well this morning. Has right-sided abdominal soreness that is adequately controlled. Tolerating CLD. OOB to chair. Continued to be tachycardic up to 120s, normotensive. No chest pain, SOB.     OBJECTIVE:   1. VITAL SIGNS:   Temp:  [97.8  F (36.6  C)-99.6  F (37.6  C)] 97.8  F (36.6  C)  Pulse:  [111-137] 120  Heart Rate:  [112-137] 112  Resp:  [11-19] 11  BP: (106-139)/(73-97) 126/81  MAP:  [51 mmHg-103 mmHg] 97 mmHg  Arterial Line BP: ()/(42-83) 129/82  SpO2:  [88 %-99 %] 97 %  Resp: 11      2. INTAKE/ OUTPUT:   I/O last 3 completed shifts:  In: 7278.92 [P.O.:60; I.V.:6718.92]  Out: 3140 [Urine:2315; Drains:325; Blood:500]    3. PHYSICAL EXAMINATION:     GEN: NAD, comfortable, alert/interactive   HEENT:  Normocephalic, atraumatic  CV: Sinus tachycardia   PULM/CHEST: Unlabored breathing on RA  GI: soft, non-distended, appropriately tender, well approximated subcostal incision c/d/i, SUMMER drain in right abdomen with serosanguinous output  : benavides catheter in place, urine yellow and clear  EXTREMITIES: No peripheral edema, moving all extremities  NEURO: Cranial nerves II-XII grossly intact, no motor-sensory deficits noted  SKIN: No rashes,  sores or ulcerations  PSYCH:  Affect: appropriate, mentation at baseline     4. INVESTIGATIONS:   Arterial Blood Gases   Recent Labs   Lab 02/05/20  1452 02/05/20  1336   PH 7.41 7.37   PCO2 33* 38   PO2 108* 115*   HCO3 20* 22     Complete Blood Count   Recent Labs   Lab 02/06/20  0350 02/05/20 2045 02/05/20  1600 02/05/20  1452   WBC 19.4* 17.0* 15.0*  --    HGB 13.6 13.2* 12.7* 11.1*    220 262  --      Basic Metabolic Panel  Recent Labs   Lab 02/06/20  0350 02/05/20 2045 02/05/20  1600 02/05/20  1452    140 141 141   POTASSIUM 4.4 4.3 4.3 3.4   CHLORIDE 109 110* 111*  --    CO2 26 24 25  --    BUN 10 10 10  --    CR 0.48* 0.50* 0.55*  --    * 138* 99 92     Liver Function Tests  Recent Labs   Lab 02/06/20  0350 02/05/20 2045 02/05/20  1600   * 488* 507*   * 364* 326*   ALKPHOS 73 78 74   BILITOTAL 3.8* 3.5* 2.1*   ALBUMIN 3.2* 3.3* 3.0*   INR 1.47* 1.29* 1.28*     Pancreatic Enzymes  No lab results found in last 7 days.  Coagulation Profile  Recent Labs   Lab 02/06/20  0350 02/05/20 2045 02/05/20  1600   INR 1.47* 1.29* 1.28*         5. RADIOLOGY:   Recent Results (from the past 24 hour(s))   XR Chest Port 1 View    Narrative    EXAM: XR CHEST PORT 1 VW  2/5/2020 4:14 PM     HISTORY: Eval for PTx       COMPARISON: CT abdomen pelvis 1/17/2020    FINDINGS:   Single frontal radiograph of the chest. Right chest Port-A-Cath tip  projects over the superior cavoatrial junction. Right upper abdominal  drain appears to project between the liver dome and the diaphragm.    The trachea is midline. The cardiovascular silhouette is within normal  limits.    No acute osseous abnormalities. Postsurgical changes of partial right  hepatectomy.    Low lung volumes. Bibasilar opacities, likely atelectasis. No  pneumothorax. No significant pleural effusion.        Impression    IMPRESSION:   1. No pneumothorax.  2. Bibasilar opacities, likely atelectasis.     I have personally reviewed the  examination and initial interpretation  and I agree with the findings.    PAULINO NUNN MD       =========================================

## 2020-02-06 NOTE — OP NOTE
Patient: Ishan Arroyo  MRN: [id number]  Date of Surgery: 2/5/2020     Pre-Op Diagnosis: Colorectal Cancer Liver Metastases  Post-Op Diagnosis: Colorectal Cancer Liver Metastases     Title of Operation:  1. Exploratory Laparotomy  2. Lysis of Adhesions  3. Intra-Operative Liver Ultrasound  4. Cholecystectomy  5. Right Hepatectomy  6. 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  Two lesions, 1 in the right hepatic dome, segment 8 abutting the right hepatic vein and encroaching close to the vena cava. The second is deep in segment 7.  Liver cysts stable.  Last dose of chemotherapy 12/30/2019.  I originally saw the patient in clinic 9/13/2019 and determined he would need a right hepatectomy for R0 resection given the location of one of the tumors abutting the proximal right hepatic vein.  Liver volumetry on 9/20/2019 demonstrated a marginal sFLR right at around 30% (see calculations below), as such, I recommended the patient undergo right PVE to generate a left FLR > 30% due to significant prior chemotherapy.  PVE was performed with microspheres and coils 10/10/2019.  However, he did not have the desired growth from this procedure and still had some patent portal flow.  As such, he was taken for attempted laparoscopic ablation as an alternative to resection, but the lesions were difficult to define in terms of borders on ultrasound.  As such, no ablation was carried out.  Liver biopsy from the right side showed microvascular injury, likely from chemotherapy.  He subsequently  underwent re-embolization on 12/3/2019 and received further chemotherapy, last dose on 12/30/2019.  He is healthy and active with good performance status.  Non-smoker.  Minimal alcohol.  No history of hepatitis or cirrhosis.  He takes a beta blocker for HTN and a statin for HLD.     TLV and FLR were calculated using the validated [facility] formula based on a BSA = 2.35 square meters (sTLV = 1267.28 (2.35) - 794.41 = 2183.7 ml  --> FLR = Volume of Segments on CT/sTLV).  His baseline FLR was as follows:      9/20/2019 Pre-Embolization  FLR (Segments 1-4) = 30.3%     4 weeks following the initial embolization, his FLR was as follows:     11/8/2019 Post Embolization  FLR (Segments 1-4) = 26.6%     6 weeks following repeat embolization, his FLR was as follows:     1/17/2020 Pre-Surgery  FLR (Segments 1-4) = 33.1%     Given his FLR was increased to above 30%, the decision was made that he was a reasonable candidate for right hepatectomy with curative intent.     Findings: Exploratory laparotomy revealed no evidence of peritoneal or obvious new hepatic metastatic disease.  He had some adhesions to the gallbladder and undersurface of the liver from prior surgery.  Below the level of the umbilicus, there were significant omental adhesions up to the midline from prior surgery.  His right liver lobe had significant scarring to the diaphragm bilaterally, likely from repeated embolizations.  This was very difficult to take down as the scarring was firm and very adherent to the diaphragm.  Once the adhesions to the diaphragm and undersurface of the liver were taken down, we could see that the liver looked in good condition, the left liver had clearly hypertrophied visually compared to when he was taken for diagnostic laparoscopy and attempted ablation.  There was not clear evidence of cirrhosis, biliary congestion, or steatosis.  Intra-operative liver ultrasound showed essentially normal hepatic venous, portal, and arterial  anatomy.  There were coils and embolization material/changes within the right lobe of the liver.  The right anterior and posterior portal vein did not have a common trunk, but instead came off the main portal vein individually.  Just proximal to the bifurcation of the right hepatic artery into anterior and posterior branches, there was a small branch going toward segment 4b.  The cysts within the right liver were located and appeared unchanged from prior visualization.  There was an unchanged metastatic lesion adjacent to the cyst in segment 7.  In addition, we were able to more clearly delineate a poorly circumscribed, but previously known hepatic dome metastatic lesion abutting the proximal right hepatic vein and appearing to extend close to, but not abutting the proximal middle hepatic vein.  There was a small margin between this lesion and the middle hepatic vein that appeared would allow for safe resection without sacrifice of the middle vein.  No other obvious metastatic disease was seen in the liver, but there were several, previously characterized cysts.  Mobilization of the right lobe was extremely difficult due to scarring to the diaphragm.  The mo hepatis also had inflammed, thickened tissue that made the portal dissection very difficult.  We were able to carefully identify and ligate the portal structures feeding vascular supply to the right lobe.  This created a very clear line of ischemic demarcation along Cantlie's line.  The course of the middle hepatic vein was followed during transection being sure to preserve the vein.  The parenchymal transection was without issues.  Total Turner maneuver time was 29 minutes in segments of 15 minutes and 14 minutes  by a 5 minute break.  Gross examination revealed a close margin with the hepatic vein tumor, but more tissue could not be taken without potential injury or sacrifice of the middle hepatic vein.  Thus, the remaining liver tissue on this  margin and covering the vein was thoroughly coagulated using the Tissue Link.  An air cholangiogram demonstrated air bubble throughout the remaining biliary tree, but no evidence of bile leak.  Visual inspection with a clean lap also showed no evidence of bile leak.  Hemostasis was adequate even with valsalva.  Visualization of the vena cava on ultrasound just prior to closure demonstrated dynamic diameter changes with waveforms, thus indicating dry intra-vascular volume status that was corrected with infusion of fluids.     Description of Procedure: After appropriate informed consent was obtained, the patient was brought to the operating room where a timeout was performed to identify the correct patient, procedure, and site.  Cefoxitin was administered for perioperative antibiotic prophylaxis.  SCDs were placed for DVT prophylaxis.  The patient was placed in suppine position with arms out to the side and was then induced under general endotracheal anesthesia.  A Wood catheter was placed.  An NG tube was placed.  The patient was prepped and draped in a sterile fashion.  We began by entering the abdominal cavity via a right subcostal incision with a midline extension to the xyphoid process.  Omental adhesions were to the anterior abdominal wall were taken down to facilitate placement of a Farias retractor.  Visual and manual examination did not reveal evidence of peritoneal or obvious new hepatic metastatic disease.  Omental adhesions were taken down in the area of the gallbladder and the underside of the liver to allow exposure of the hilum.  The liver was then seen to have significant, firm, and tight adhesions to the underside of the diaphragm.  This was likely from prior surgery as well as multiple embolizations.  We were very careful and spent ~90 minutes carefully releasing the liver from the undersurface of the diaphragm so as to avoid diaphragm injury or significant capsular liver tears with resultant  blood loss.  We were able to do this successfully and also to expose the suprahepatic inferior vena cava.  Subsequent intra-operative liver ultrasound showed essentially normal hepatic venous, portal, and arterial anatomy.  There were coils and embolization materials/changes within the right lobe of the liver.  The right anterior and posterior portal vein did not have a common trunk, but instead came off the main portal vein individually.  Just proximal to the bifurcation of the right hepatic artery into anterior and posterior branches, there was a small branch going toward segment 4b.  The cysts within the right liver were located and appeared unchanged from prior visualization.  There was an unchanged metastatic lesion adjacent to the cyst in segment 7.  In addition, we were able to more clearly delineate a poorly circumscribed, but previously known hepatic dome metastatic lesion abutting the proximal right hepatic vein and appearing to extend close to, but not abutting the proximal middle hepatic vein.  There was a small margin between this lesion and the middle hepatic vein that appeared would allow for safe resection without sacrifice of the middle vein.  No other obvious metastatic disease was seen in the liver, but there were several, previously characterized cysts.  At this point, we completed mobilization of the right liver with division of the coronary and triangular ligaments.  This was much more difficult than usual given the above mentioned scarring/inflammation.  The liver was then rotated to the left and the posterior hepatic veins and hepatocaval ligament were released with the assistance of a 35 mm vascular load stapler.  The right hepatic vein was encircled with a vessel loop and tagged.  We next performed a top down cholecystectomy and ligated the cystic duct and artery with 2-0 silk ties.  The gallbladder was removed and sent for permanent section.  We next proceeded with the portal dissection,  which was very difficult due to inflammation and firm tissue from prior portal vein embolization.  The cystic duct stump was retracted medially and a plane was developed between the main portal vein and the common hepatic/bile duct.  The right hepatic artery was identified coursing posterior to the bile duct and towards the right hilum.  The right anterior and posterior branches were individually identified, ligated, and divided using a 35 mm vascular load stapler.  A more proximal segment 4b branch was preserved.  The right anterior and posterior portal vein were then identified and encircled with vessel loops.  We could not get enough room in this area for safe division with a vascular stapler given inflammation.  Thus, the right anterior and posterior portal veins were ligated with 2-0 silk ties, but not divided.  This created a clear line of ischemic demarcation along Cantlie's line.  To facilitate retraction of the liver tissue away from the vena cava and the middle hepatic vein to try and obtain a margin between the dome lesion and middle hepatic vein, the right hepatic vein was ligated and divided with a 35 mm vascular stapler.  An umbilical tape was then placed around the mo through the foramen of [place] to facilitate a Beverly maneuver for parenchymal transection.  The liver capsule was marked and divided along the demarcation line using a Bovie being careful to direct the upcoming parenchymal transection to allow for removal of the dome lesion.  The middle hepatic vein was marked on the surface of the liver as well to allow for preservation.  The liver parenchyma was then divided using a Harmonic and 45 mm vascular stapler loads.  During this transection, the right biliary plate and also the right portal vein branches were divided intra-parenchymally just above the right portal vein branch ligation ties placed earlier in the case.  Most of the caudate lobe was preserved.  The right hepatectomy specimen  was sent for permanent section and gross sectioning.  A total of 29 minutes of [place] time was used.  15 minutes and 14 minutes  by a 5 minute break.  The margin on the dome tumor was very close, but the close proximity of the middle hepatic vein to the remaining cut liver edge was too close to facilitate further resection in this area without risking injury or sacrifice of the middle vein.  Thus, the tissue in this area was thoroughly coagulated using the Tissue Link.  Hemostasis was then achieved on the cut liver surface with coagulation using Tissue Link.  Hemostasis was adequate even with valsalva.  Visual inspection with a clean white lap pad showed no evidence of bile leakage.  We then performed an air cholangiogram through the cystic duct stump and noted air bubbles in the biliary system, but no bubble leakage when the liver was submerged beneath saline.  The cystic duct stump was again ligated with 2-0 silk.  Fibrin glue was placed to cover the cut edge of the remaining liver.  Repeat ultrasound was performed and identified patent venous and arterial flow throughout the remnant liver.  Given the patient was having some issues with hypotension and tachycardia at this time, I also performed ultrasound of the vena cava, which demonstrated dynamic diameter changes with blood flow wave forms.  Subsequent fluid resuscitation improved the blood pressure and heart rate.  We again checked for hemostasis, which was excellent.  The abdomen was irrigated with 2 liters of saline.   A #19 round Daniel drain was placed adjacent to the right hepatectomy resection bed and secured to the skin with 2-0 nylon.  The incisional fascia was closed with #1 looped PDS in 2 layers.  The open incision was irrigated.  The skin was then closed using 4-0 monacryl and skin glue.  All instrument, sponge, and needle counts were correct at the end of the case x 2.  The patient was then extubated and taken to the PACU in stable  condition.  He tolerated the procedure well with exception of some labile blood pressure during the case.  I discussed the case with the family in the waiting room.     Estimated Blood Loss: 500 mL  Total Beverly Time: 29 minutes.  (15 min. Beverly, 5 min. Break, 14 min. Beverly)  Urine Output: See anesthesia record.  Fluids: See anesthesia record.  Drains: #19 Round Daniel in the Right Upper Quadrant  Specimens: Gallbladder, Right Hepatectomy     Disposition: PACU --> ICU    Modifier 22: This case was extremely difficult given the large amount of inflammation and scarring that had occurred from prior surgery and embolization.  So much so, that it required assistance from a second attending surgeon.  The case was at least 50% more difficult and took 2-3 hours longer than normal due to these issues.

## 2020-02-07 ENCOUNTER — APPOINTMENT (OUTPATIENT)
Dept: PHYSICAL THERAPY | Facility: CLINIC | Age: 60
DRG: 406 | End: 2020-02-07
Attending: SURGERY
Payer: COMMERCIAL

## 2020-02-07 ENCOUNTER — APPOINTMENT (OUTPATIENT)
Dept: CARDIOLOGY | Facility: CLINIC | Age: 60
DRG: 406 | End: 2020-02-07
Attending: SURGERY
Payer: COMMERCIAL

## 2020-02-07 LAB
ABO + RH BLD: NORMAL
ABO + RH BLD: NORMAL
ALBUMIN SERPL-MCNC: 2.8 G/DL (ref 3.4–5)
ALBUMIN UR-MCNC: NEGATIVE MG/DL
ALP SERPL-CCNC: 67 U/L (ref 40–150)
ALT SERPL W P-5'-P-CCNC: 325 U/L (ref 0–70)
ANION GAP SERPL CALCULATED.3IONS-SCNC: 4 MMOL/L (ref 3–14)
APPEARANCE UR: CLEAR
AST SERPL W P-5'-P-CCNC: 301 U/L (ref 0–45)
BILIRUB SERPL-MCNC: 3 MG/DL (ref 0.2–1.3)
BILIRUB UR QL STRIP: NEGATIVE
BLD GP AB SCN SERPL QL: NORMAL
BLOOD BANK CMNT PATIENT-IMP: NORMAL
BUN SERPL-MCNC: 13 MG/DL (ref 7–30)
CALCIUM SERPL-MCNC: 7.8 MG/DL (ref 8.5–10.1)
CHLORIDE SERPL-SCNC: 106 MMOL/L (ref 94–109)
CO2 SERPL-SCNC: 26 MMOL/L (ref 20–32)
COLOR UR AUTO: YELLOW
CREAT SERPL-MCNC: 0.72 MG/DL (ref 0.66–1.25)
ERYTHROCYTE [DISTWIDTH] IN BLOOD BY AUTOMATED COUNT: 16.8 % (ref 10–15)
GFR SERPL CREATININE-BSD FRML MDRD: >90 ML/MIN/{1.73_M2}
GLUCOSE BLDC GLUCOMTR-MCNC: 105 MG/DL (ref 70–99)
GLUCOSE BLDC GLUCOMTR-MCNC: 107 MG/DL (ref 70–99)
GLUCOSE BLDC GLUCOMTR-MCNC: 110 MG/DL (ref 70–99)
GLUCOSE BLDC GLUCOMTR-MCNC: 114 MG/DL (ref 70–99)
GLUCOSE BLDC GLUCOMTR-MCNC: 130 MG/DL (ref 70–99)
GLUCOSE BLDC GLUCOMTR-MCNC: 88 MG/DL (ref 70–99)
GLUCOSE SERPL-MCNC: 229 MG/DL (ref 70–99)
GLUCOSE UR STRIP-MCNC: NEGATIVE MG/DL
HCT VFR BLD AUTO: 34.8 % (ref 40–53)
HGB BLD-MCNC: 11.2 G/DL (ref 13.3–17.7)
HGB UR QL STRIP: ABNORMAL
INR PPP: 1.72 (ref 0.86–1.14)
INTERPRETATION ECG - MUSE: NORMAL
INTERPRETATION ECG - MUSE: NORMAL
KETONES UR STRIP-MCNC: NEGATIVE MG/DL
LACTATE BLD-SCNC: 1.1 MMOL/L (ref 0.7–2)
LEUKOCYTE ESTERASE UR QL STRIP: ABNORMAL
MAGNESIUM SERPL-MCNC: 2.2 MG/DL (ref 1.6–2.3)
MCH RBC QN AUTO: 29.6 PG (ref 26.5–33)
MCHC RBC AUTO-ENTMCNC: 32.2 G/DL (ref 31.5–36.5)
MCV RBC AUTO: 92 FL (ref 78–100)
MUCOUS THREADS #/AREA URNS LPF: PRESENT /LPF
NITRATE UR QL: NEGATIVE
NT-PROBNP SERPL-MCNC: 216 PG/ML (ref 0–900)
PH UR STRIP: 5.5 PH (ref 5–7)
PHOSPHATE SERPL-MCNC: 2 MG/DL (ref 2.5–4.5)
PLATELET # BLD AUTO: 158 10E9/L (ref 150–450)
POTASSIUM SERPL-SCNC: 4.2 MMOL/L (ref 3.4–5.3)
PROT SERPL-MCNC: 5.5 G/DL (ref 6.8–8.8)
RBC # BLD AUTO: 3.78 10E12/L (ref 4.4–5.9)
RBC #/AREA URNS AUTO: 7 /HPF (ref 0–2)
SODIUM SERPL-SCNC: 136 MMOL/L (ref 133–144)
SOURCE: ABNORMAL
SP GR UR STRIP: 1.02 (ref 1–1.03)
SPECIMEN EXP DATE BLD: NORMAL
SQUAMOUS #/AREA URNS AUTO: <1 /HPF (ref 0–1)
TRANS CELLS #/AREA URNS HPF: <1 /HPF (ref 0–1)
UROBILINOGEN UR STRIP-MCNC: 2 MG/DL (ref 0–2)
WBC # BLD AUTO: 18.3 10E9/L (ref 4–11)
WBC #/AREA URNS AUTO: 7 /HPF (ref 0–5)

## 2020-02-07 PROCEDURE — 84100 ASSAY OF PHOSPHORUS: CPT | Performed by: SURGERY

## 2020-02-07 PROCEDURE — 93010 ELECTROCARDIOGRAM REPORT: CPT | Performed by: INTERNAL MEDICINE

## 2020-02-07 PROCEDURE — 25800030 ZZH RX IP 258 OP 636: Performed by: SURGERY

## 2020-02-07 PROCEDURE — 87040 BLOOD CULTURE FOR BACTERIA: CPT | Performed by: STUDENT IN AN ORGANIZED HEALTH CARE EDUCATION/TRAINING PROGRAM

## 2020-02-07 PROCEDURE — 86900 BLOOD TYPING SEROLOGIC ABO: CPT | Performed by: STUDENT IN AN ORGANIZED HEALTH CARE EDUCATION/TRAINING PROGRAM

## 2020-02-07 PROCEDURE — 20000004 ZZH R&B ICU UMMC

## 2020-02-07 PROCEDURE — 25500064 ZZH RX 255 OP 636: Performed by: SURGERY

## 2020-02-07 PROCEDURE — 36415 COLL VENOUS BLD VENIPUNCTURE: CPT | Performed by: STUDENT IN AN ORGANIZED HEALTH CARE EDUCATION/TRAINING PROGRAM

## 2020-02-07 PROCEDURE — 97530 THERAPEUTIC ACTIVITIES: CPT | Mod: GP

## 2020-02-07 PROCEDURE — 93306 TTE W/DOPPLER COMPLETE: CPT | Mod: 26 | Performed by: INTERNAL MEDICINE

## 2020-02-07 PROCEDURE — 25000132 ZZH RX MED GY IP 250 OP 250 PS 637: Performed by: STUDENT IN AN ORGANIZED HEALTH CARE EDUCATION/TRAINING PROGRAM

## 2020-02-07 PROCEDURE — 97116 GAIT TRAINING THERAPY: CPT | Mod: GP

## 2020-02-07 PROCEDURE — P9047 ALBUMIN (HUMAN), 25%, 50ML: HCPCS | Performed by: STUDENT IN AN ORGANIZED HEALTH CARE EDUCATION/TRAINING PROGRAM

## 2020-02-07 PROCEDURE — 25000125 ZZHC RX 250: Performed by: STUDENT IN AN ORGANIZED HEALTH CARE EDUCATION/TRAINING PROGRAM

## 2020-02-07 PROCEDURE — 25800030 ZZH RX IP 258 OP 636: Performed by: STUDENT IN AN ORGANIZED HEALTH CARE EDUCATION/TRAINING PROGRAM

## 2020-02-07 PROCEDURE — 86850 RBC ANTIBODY SCREEN: CPT | Performed by: STUDENT IN AN ORGANIZED HEALTH CARE EDUCATION/TRAINING PROGRAM

## 2020-02-07 PROCEDURE — 00000146 ZZHCL STATISTIC GLUCOSE BY METER IP

## 2020-02-07 PROCEDURE — 25000128 H RX IP 250 OP 636: Performed by: STUDENT IN AN ORGANIZED HEALTH CARE EDUCATION/TRAINING PROGRAM

## 2020-02-07 PROCEDURE — 83880 ASSAY OF NATRIURETIC PEPTIDE: CPT | Performed by: SURGERY

## 2020-02-07 PROCEDURE — 83735 ASSAY OF MAGNESIUM: CPT | Performed by: SURGERY

## 2020-02-07 PROCEDURE — 93005 ELECTROCARDIOGRAM TRACING: CPT

## 2020-02-07 PROCEDURE — 25000128 H RX IP 250 OP 636: Performed by: SURGERY

## 2020-02-07 PROCEDURE — 81001 URINALYSIS AUTO W/SCOPE: CPT | Performed by: STUDENT IN AN ORGANIZED HEALTH CARE EDUCATION/TRAINING PROGRAM

## 2020-02-07 PROCEDURE — 36415 COLL VENOUS BLD VENIPUNCTURE: CPT | Performed by: SURGERY

## 2020-02-07 PROCEDURE — 25000132 ZZH RX MED GY IP 250 OP 250 PS 637: Performed by: SURGERY

## 2020-02-07 PROCEDURE — 40000264 ECHOCARDIOGRAM COMPLETE

## 2020-02-07 PROCEDURE — 83605 ASSAY OF LACTIC ACID: CPT | Performed by: SURGERY

## 2020-02-07 PROCEDURE — 85610 PROTHROMBIN TIME: CPT | Performed by: SURGERY

## 2020-02-07 PROCEDURE — 25000125 ZZHC RX 250: Performed by: SURGERY

## 2020-02-07 PROCEDURE — 80053 COMPREHEN METABOLIC PANEL: CPT | Performed by: SURGERY

## 2020-02-07 PROCEDURE — 86901 BLOOD TYPING SEROLOGIC RH(D): CPT | Performed by: STUDENT IN AN ORGANIZED HEALTH CARE EDUCATION/TRAINING PROGRAM

## 2020-02-07 PROCEDURE — 85027 COMPLETE CBC AUTOMATED: CPT | Performed by: SURGERY

## 2020-02-07 RX ORDER — ALBUMIN (HUMAN) 12.5 G/50ML
12.5 SOLUTION INTRAVENOUS ONCE
Status: COMPLETED | OUTPATIENT
Start: 2020-02-07 | End: 2020-02-07

## 2020-02-07 RX ORDER — METOPROLOL TARTRATE 1 MG/ML
5 INJECTION, SOLUTION INTRAVENOUS EVERY 5 MIN PRN
Status: DISCONTINUED | OUTPATIENT
Start: 2020-02-07 | End: 2020-02-11 | Stop reason: HOSPADM

## 2020-02-07 RX ORDER — METOPROLOL TARTRATE 1 MG/ML
5 INJECTION, SOLUTION INTRAVENOUS EVERY 5 MIN PRN
Status: DISCONTINUED | OUTPATIENT
Start: 2020-02-07 | End: 2020-02-07

## 2020-02-07 RX ORDER — OXYCODONE HYDROCHLORIDE 5 MG/1
5 TABLET ORAL EVERY 6 HOURS PRN
Qty: 30 TABLET | Refills: 0 | Status: SHIPPED | OUTPATIENT
Start: 2020-02-07 | End: 2020-02-28

## 2020-02-07 RX ADMIN — ENOXAPARIN SODIUM 40 MG: 40 INJECTION SUBCUTANEOUS at 09:05

## 2020-02-07 RX ADMIN — AMIODARONE HYDROCHLORIDE 1 MG/MIN: 50 INJECTION, SOLUTION INTRAVENOUS at 16:30

## 2020-02-07 RX ADMIN — METOPROLOL TARTRATE 50 MG: 50 TABLET, FILM COATED ORAL at 20:20

## 2020-02-07 RX ADMIN — ALBUMIN HUMAN 12.5 G: 0.25 SOLUTION INTRAVENOUS at 15:31

## 2020-02-07 RX ADMIN — DEXTROSE AND SODIUM CHLORIDE: 5; 900 INJECTION, SOLUTION INTRAVENOUS at 09:24

## 2020-02-07 RX ADMIN — AMIODARONE HYDROCHLORIDE 1 MG/MIN: 50 INJECTION, SOLUTION INTRAVENOUS at 20:20

## 2020-02-07 RX ADMIN — DEXTROSE AND SODIUM CHLORIDE: 5; 900 INJECTION, SOLUTION INTRAVENOUS at 00:20

## 2020-02-07 RX ADMIN — HUMAN ALBUMIN MICROSPHERES AND PERFLUTREN 6 ML: 10; .22 INJECTION, SOLUTION INTRAVENOUS at 08:00

## 2020-02-07 RX ADMIN — SIMVASTATIN 20 MG: 20 TABLET, FILM COATED ORAL at 21:23

## 2020-02-07 RX ADMIN — METOPROLOL TARTRATE 50 MG: 50 TABLET, FILM COATED ORAL at 09:05

## 2020-02-07 RX ADMIN — SODIUM PHOSPHATE, MONOBASIC, MONOHYDRATE AND SODIUM PHOSPHATE, DIBASIC, ANHYDROUS 25 MMOL: 276; 142 INJECTION, SOLUTION INTRAVENOUS at 09:07

## 2020-02-07 RX ADMIN — METOPROLOL TARTRATE 5 MG: 5 INJECTION INTRAVENOUS at 14:38

## 2020-02-07 RX ADMIN — AMIODARONE HYDROCHLORIDE 50 MG: 50 INJECTION, SOLUTION INTRAVENOUS at 16:12

## 2020-02-07 RX ADMIN — DEXTROSE AND SODIUM CHLORIDE: 5; 900 INJECTION, SOLUTION INTRAVENOUS at 17:27

## 2020-02-07 ASSESSMENT — ACTIVITIES OF DAILY LIVING (ADL)
ADLS_ACUITY_SCORE: 14

## 2020-02-07 ASSESSMENT — MIFFLIN-ST. JEOR: SCORE: 2032.88

## 2020-02-07 ASSESSMENT — PAIN DESCRIPTION - DESCRIPTORS
DESCRIPTORS: SORE
DESCRIPTORS: DISCOMFORT
DESCRIPTORS: SORE

## 2020-02-07 NOTE — PLAN OF CARE
D/I: Patient on unit 4A Surgical/Neuro ICU   Neuro- Alert and oriented x 4. Cooperative. Baseline numbness/tingling BLE.  CV- At beginning of my shift pt. Was in Afib, after multiple interventions patients converted back to sinus rhythm. See charting for specific interventions. MAP goal >65.  Pulm- Currently on 1L NC, unable to titrate off at this time. Lung sounds clear over diminished bases.   GI- Clear liquid diet, tolerating well. LBM 2/5.  - Wood in place, at beginning of my shift, pt. At decreased/anuric amount of urine. Has improved as shift has gone on. Adequate urine output now.  Gtts- d5 normal saline @ 125/hr. Dilaudid PCA 0.3 mg q10min  Pain- Adequate pain control with PCA  Lines and drains- kennedy, SUMMER drain, PIV x3.   See flow sheets for further interventions and assessments.   A: Stable   P: Continue to monitor pt closely. Notify MD of significant changes

## 2020-02-07 NOTE — PROGRESS NOTES
Surgery Progress Note    S:  Pt went into afib with RVR yesterday evening and was given amiodorone and 2 gm Mg, as well as I L bolus due to soft pressures. He converted back to sinus rhythm about 2 hours later. This morning pt is doing well and seen resting comfortably. Pain 6/10 when seen and using PCA as needed. Not passing flatus. Urine output adequate with Wood. Walked yesterday with some pain at incision site. No nausea/emesis. Tolerating clear diet. Denies SOB, angina, or fever.     O:  Temp:  [97.8  F (36.6  C)-98.6  F (37  C)] 98.6  F (37  C)  Pulse:  [] 86  Heart Rate:  [] 84  Resp:  [10-18] 18  BP: ()/(57-95) 130/76  MAP:  [89 mmHg-101 mmHg] 97 mmHg  Arterial Line BP: (121-138)/(69-83) 129/82  SpO2:  [88 %-100 %] 97 %    I/O last 3 completed shifts:  In: 6388.25 [P.O.:840; I.V.:3548.25; IV Piggyback:2000]  Out: 1490 [Urine:1060; Drains:430]    Gen: A&Ox3, NAD  Resp: non-labored breathing on 1 L NC. Lungs clear to auscultation in anterior fields bilaterally.   Abd: Soft, mild distension, Non-tender, No rebound or guarding.\  Ext: warm and well perfused, pulses intact, ROM intact  Incisions: c/d/i; drain with serosanguinous drainage    Labs:  Complete Blood Count   Recent Labs   Lab 02/07/20 0439 02/06/20 1837 02/06/20  0350 02/05/20 2045   WBC 18.3* 23.8* 19.4* 17.0*   HGB 11.2* 13.6 13.6 13.2*    285 204 220     Basic Metabolic Panel  Recent Labs   Lab 02/07/20  0439 02/06/20 1837 02/06/20  0350 02/05/20 2045    136 139 140   POTASSIUM 4.2 4.9 4.4 4.3   CHLORIDE 106 106 109 110*   CO2 26 28 26 24   BUN 13 17 10 10   CR 0.72 1.24 0.48* 0.50*   * 129* 155* 138*     Liver Function Tests  Recent Labs   Lab 02/07/20  0439 02/06/20  1837 02/06/20  0350 02/05/20 2045 02/05/20  1600   *  --  507* 488* 507*   *  --  416* 364* 326*   ALKPHOS 67  --  73 78 74   BILITOTAL 3.0*  --  3.8* 3.5* 2.1*   ALBUMIN 2.8*  --  3.2* 3.3* 3.0*   INR 1.72* 1.69* 1.47* 1.29*  1.28*     Pancreatic Enzymes  No lab results found in last 7 days.  Coagulation Profile  Recent Labs   Lab 02/07/20  0439 02/06/20  1837 02/06/20  0350 02/05/20  2045   INR 1.72* 1.69* 1.47* 1.29*       Imaging:   CTPE 2/6/20:  Impression:   1. No pulmonary embolism or aortic abnormality.  2. Moderate right-sided pleural effusion with adjacent compressive  atelectasis. Dependent atelectasis in the left lung.  3. Postsurgical changes of the liver with surgical drain in the right  upper quadrant. Postprocedural pneumoperitoneum.    Echocardiogram, 2/7/20  Interpretation Summary  Sinus Rhythm during study.  Global and regional left ventricular function is normal with an EF of 60-65%.  Right ventricular function, chamber size, wall motion, and thickness are  normal.  Right ventricular systolic pressure is 33mmHg above the right atrial pressure.  Trivial pericardial effusion is present.  A left pleural effusion is present.  Previous study not available for comparison.    A/P: Ishan Arroyo is a 59 year old male POD#2 s/p open right hepatectomy due to rectal cancer with liver metastases. Post op course complicated by tachycardia and hypotension. POD#1 went into afib with RVR x 2-3 hrs with conversion back to sinus rhythm s/p amiodarone, magnesium, and digoxin. Now hemodynamically stable.     - Transfer to floor  - Afib with RVR: trops negative; CT PE negative; echo unremarkable. Most likely due to catecholamine response post-op.    - Cardiology consult    - Continue home metoprolol  - Diet: Clear liquids  - Pain: dPCA   - FEN: mIVF @ 125 mL/hr, electrolyte replacement protocol  - Remove Wood today   - PT/OT  - Lovenox    Myra Prather, MS3      RESIDENT ADDENDUM    I have personally seen, evaluated and examined the patient. I agree with the above note.    Patient with worsening oliguria yesterday. Received bolus NS. Then developed A fib w/RVR 170s with soft BPs. Given amio bolus with rate and BP improvement. Labs with  "new WES. Received another 1L NS bolus and 10cc 25% albumin bolus. Cardiology consulted and digoxin given/amio drip stopped. Patient converted to SR lat evening. UOP improved overnight.    /76   Pulse 86   Temp 98.6  F (37  C) (Oral)   Resp 18   Ht 1.854 m (6' 1\")   Wt 115.9 kg (255 lb 8.2 oz)   SpO2 97%   BMI 33.71 kg/m    NAD  RRR  CTAB  Soft, NT, ND; SUMMER sero sang    UOP 1060/725cc  SUMMER 430/100cc    Cardiac echo wnl  Repeat ECG this AM SR w/o wave abnormalities    Ishan Arroyo is a 59 year old male hx of rectal cancer with liver metastases POD#2 s/p open right hepatectomy     -clears  -home metoprolol  -appreciated cardiology recs  -dPCA  -mIVF  -replete electrolytes  -monitor uop, anticipate benavides out today  -PT/OT  -lovenox  -transfer to  when bed available    Bertrand Salmon MD PGY-5  Surgery Resident  Pg [phone]          "

## 2020-02-07 NOTE — PLAN OF CARE
Major Shift Events: Patient received floor orders at 10:30. Ambulated in hallway X 1. Dc'd art line per. Urine output declined after 1400, MD notified and 1 L bolus given. Blood pressure soft, MD notified and ECG patches reapplied = patient in A fib with RVR with HR up to 200. SICU called to room, see orders. Patient currently stable after another 1 L bolus, Amiodarone bolus/ gtt, labs, and 2 gm Magnesium.   Plan: Continue to monitor and notify MD of any changes. Daughter updated over the phone.   For vital signs and complete assessments, please see documentation flowsheets.

## 2020-02-07 NOTE — PLAN OF CARE
Discharge Planner PT   Patient plan for discharge: not discussed today     Current status: Pt performed bed mobility and supine>sit with HOB elevated and min A. Performed STS with FWW and CGA. Ambulated 2 x ~100' with CGA-min A. 1 bout without AD, 1 bout with FWW - improved stability. Pt on 2L O2 NC with activity, VS > 90%.     Barriers to return to prior living situation: medical needs, current mobility, level of A    Recommendations for discharge: pending ongoing progress, currently would rec rehab until improved activity tolerance    Rationale for recommendations: Would benefit from ongoing therapy to continue to safely progress functional mobility, strength and activity tolerance toward baseline in order to maximize functional independent mobility.        Entered by: Rosario Mazariegos 02/07/2020 1:47 PM

## 2020-02-08 ENCOUNTER — APPOINTMENT (OUTPATIENT)
Dept: PHYSICAL THERAPY | Facility: CLINIC | Age: 60
DRG: 406 | End: 2020-02-08
Attending: SURGERY
Payer: COMMERCIAL

## 2020-02-08 ENCOUNTER — APPOINTMENT (OUTPATIENT)
Dept: OCCUPATIONAL THERAPY | Facility: CLINIC | Age: 60
DRG: 406 | End: 2020-02-08
Attending: SURGERY
Payer: COMMERCIAL

## 2020-02-08 LAB
ALBUMIN SERPL-MCNC: 2.6 G/DL (ref 3.4–5)
ALP SERPL-CCNC: 69 U/L (ref 40–150)
ALT SERPL W P-5'-P-CCNC: 229 U/L (ref 0–70)
ANION GAP SERPL CALCULATED.3IONS-SCNC: 3 MMOL/L (ref 3–14)
AST SERPL W P-5'-P-CCNC: 171 U/L (ref 0–45)
BILIRUB SERPL-MCNC: 2.8 MG/DL (ref 0.2–1.3)
BUN SERPL-MCNC: 6 MG/DL (ref 7–30)
CALCIUM SERPL-MCNC: 7.8 MG/DL (ref 8.5–10.1)
CHLORIDE SERPL-SCNC: 104 MMOL/L (ref 94–109)
CO2 SERPL-SCNC: 28 MMOL/L (ref 20–32)
CREAT SERPL-MCNC: 0.51 MG/DL (ref 0.66–1.25)
ERYTHROCYTE [DISTWIDTH] IN BLOOD BY AUTOMATED COUNT: 16.3 % (ref 10–15)
GFR SERPL CREATININE-BSD FRML MDRD: >90 ML/MIN/{1.73_M2}
GLUCOSE BLDC GLUCOMTR-MCNC: 102 MG/DL (ref 70–99)
GLUCOSE BLDC GLUCOMTR-MCNC: 103 MG/DL (ref 70–99)
GLUCOSE BLDC GLUCOMTR-MCNC: 110 MG/DL (ref 70–99)
GLUCOSE BLDC GLUCOMTR-MCNC: 113 MG/DL (ref 70–99)
GLUCOSE BLDC GLUCOMTR-MCNC: 121 MG/DL (ref 70–99)
GLUCOSE SERPL-MCNC: 134 MG/DL (ref 70–99)
HCT VFR BLD AUTO: 34.4 % (ref 40–53)
HGB BLD-MCNC: 10.9 G/DL (ref 13.3–17.7)
INR PPP: 1.52 (ref 0.86–1.14)
MAGNESIUM SERPL-MCNC: 1.9 MG/DL (ref 1.6–2.3)
MCH RBC QN AUTO: 28.7 PG (ref 26.5–33)
MCHC RBC AUTO-ENTMCNC: 31.7 G/DL (ref 31.5–36.5)
MCV RBC AUTO: 91 FL (ref 78–100)
PHOSPHATE SERPL-MCNC: 1.3 MG/DL (ref 2.5–4.5)
PHOSPHATE SERPL-MCNC: 1.5 MG/DL (ref 2.5–4.5)
PLATELET # BLD AUTO: 155 10E9/L (ref 150–450)
POTASSIUM SERPL-SCNC: 3.5 MMOL/L (ref 3.4–5.3)
PROT SERPL-MCNC: 5.3 G/DL (ref 6.8–8.8)
RBC # BLD AUTO: 3.8 10E12/L (ref 4.4–5.9)
SODIUM SERPL-SCNC: 134 MMOL/L (ref 133–144)
WBC # BLD AUTO: 13.3 10E9/L (ref 4–11)

## 2020-02-08 PROCEDURE — 25000132 ZZH RX MED GY IP 250 OP 250 PS 637: Performed by: STUDENT IN AN ORGANIZED HEALTH CARE EDUCATION/TRAINING PROGRAM

## 2020-02-08 PROCEDURE — 25000128 H RX IP 250 OP 636: Performed by: STUDENT IN AN ORGANIZED HEALTH CARE EDUCATION/TRAINING PROGRAM

## 2020-02-08 PROCEDURE — 25800030 ZZH RX IP 258 OP 636: Performed by: STUDENT IN AN ORGANIZED HEALTH CARE EDUCATION/TRAINING PROGRAM

## 2020-02-08 PROCEDURE — 85027 COMPLETE CBC AUTOMATED: CPT | Performed by: SURGERY

## 2020-02-08 PROCEDURE — 97165 OT EVAL LOW COMPLEX 30 MIN: CPT | Mod: GO | Performed by: OCCUPATIONAL THERAPIST

## 2020-02-08 PROCEDURE — 93005 ELECTROCARDIOGRAM TRACING: CPT

## 2020-02-08 PROCEDURE — 83735 ASSAY OF MAGNESIUM: CPT | Performed by: SURGERY

## 2020-02-08 PROCEDURE — 80053 COMPREHEN METABOLIC PANEL: CPT | Performed by: SURGERY

## 2020-02-08 PROCEDURE — 36415 COLL VENOUS BLD VENIPUNCTURE: CPT | Performed by: SURGERY

## 2020-02-08 PROCEDURE — 97530 THERAPEUTIC ACTIVITIES: CPT | Mod: GP

## 2020-02-08 PROCEDURE — 25000132 ZZH RX MED GY IP 250 OP 250 PS 637: Performed by: SURGERY

## 2020-02-08 PROCEDURE — 97116 GAIT TRAINING THERAPY: CPT | Mod: GP

## 2020-02-08 PROCEDURE — 85610 PROTHROMBIN TIME: CPT | Performed by: SURGERY

## 2020-02-08 PROCEDURE — 97535 SELF CARE MNGMENT TRAINING: CPT | Mod: GO | Performed by: OCCUPATIONAL THERAPIST

## 2020-02-08 PROCEDURE — 97530 THERAPEUTIC ACTIVITIES: CPT | Mod: GO | Performed by: OCCUPATIONAL THERAPIST

## 2020-02-08 PROCEDURE — 00000146 ZZHCL STATISTIC GLUCOSE BY METER IP

## 2020-02-08 PROCEDURE — 20000004 ZZH R&B ICU UMMC

## 2020-02-08 PROCEDURE — 84100 ASSAY OF PHOSPHORUS: CPT | Performed by: SURGERY

## 2020-02-08 PROCEDURE — 25000125 ZZHC RX 250: Performed by: STUDENT IN AN ORGANIZED HEALTH CARE EDUCATION/TRAINING PROGRAM

## 2020-02-08 PROCEDURE — 40000141 ZZH STATISTIC PERIPHERAL IV START W/O US GUIDANCE

## 2020-02-08 PROCEDURE — 99221 1ST HOSP IP/OBS SF/LOW 40: CPT | Mod: GC | Performed by: INTERNAL MEDICINE

## 2020-02-08 PROCEDURE — 84100 ASSAY OF PHOSPHORUS: CPT | Performed by: STUDENT IN AN ORGANIZED HEALTH CARE EDUCATION/TRAINING PROGRAM

## 2020-02-08 PROCEDURE — 36415 COLL VENOUS BLD VENIPUNCTURE: CPT | Performed by: STUDENT IN AN ORGANIZED HEALTH CARE EDUCATION/TRAINING PROGRAM

## 2020-02-08 PROCEDURE — 93010 ELECTROCARDIOGRAM REPORT: CPT | Performed by: INTERNAL MEDICINE

## 2020-02-08 RX ORDER — CALCIUM CARBONATE 500 MG/1
500-1000 TABLET, CHEWABLE ORAL 3 TIMES DAILY PRN
Status: DISCONTINUED | OUTPATIENT
Start: 2020-02-08 | End: 2020-02-11 | Stop reason: HOSPADM

## 2020-02-08 RX ORDER — MAGNESIUM SULFATE HEPTAHYDRATE 40 MG/ML
4 INJECTION, SOLUTION INTRAVENOUS EVERY 4 HOURS PRN
Status: DISCONTINUED | OUTPATIENT
Start: 2020-02-08 | End: 2020-02-11 | Stop reason: HOSPADM

## 2020-02-08 RX ORDER — POTASSIUM CHLORIDE 7.45 MG/ML
10 INJECTION INTRAVENOUS
Status: DISCONTINUED | OUTPATIENT
Start: 2020-02-08 | End: 2020-02-11 | Stop reason: HOSPADM

## 2020-02-08 RX ORDER — POTASSIUM CHLORIDE 1.5 G/1.58G
20-40 POWDER, FOR SOLUTION ORAL
Status: DISCONTINUED | OUTPATIENT
Start: 2020-02-08 | End: 2020-02-11 | Stop reason: HOSPADM

## 2020-02-08 RX ORDER — POTASSIUM CHLORIDE 29.8 MG/ML
20 INJECTION INTRAVENOUS
Status: DISCONTINUED | OUTPATIENT
Start: 2020-02-08 | End: 2020-02-11 | Stop reason: HOSPADM

## 2020-02-08 RX ORDER — AMIODARONE HYDROCHLORIDE 200 MG/1
400 TABLET ORAL 2 TIMES DAILY
Status: DISCONTINUED | OUTPATIENT
Start: 2020-02-09 | End: 2020-02-08

## 2020-02-08 RX ORDER — POTASSIUM CL/LIDO/0.9 % NACL 10MEQ/0.1L
10 INTRAVENOUS SOLUTION, PIGGYBACK (ML) INTRAVENOUS
Status: DISCONTINUED | OUTPATIENT
Start: 2020-02-08 | End: 2020-02-11 | Stop reason: HOSPADM

## 2020-02-08 RX ORDER — MAGNESIUM SULFATE HEPTAHYDRATE 40 MG/ML
2 INJECTION, SOLUTION INTRAVENOUS DAILY PRN
Status: DISCONTINUED | OUTPATIENT
Start: 2020-02-08 | End: 2020-02-11 | Stop reason: HOSPADM

## 2020-02-08 RX ORDER — POTASSIUM CHLORIDE 750 MG/1
20-40 TABLET, EXTENDED RELEASE ORAL
Status: DISCONTINUED | OUTPATIENT
Start: 2020-02-08 | End: 2020-02-11 | Stop reason: HOSPADM

## 2020-02-08 RX ADMIN — METOPROLOL TARTRATE 75 MG: 50 TABLET, FILM COATED ORAL at 19:52

## 2020-02-08 RX ADMIN — CALCIUM CARBONATE (ANTACID) CHEW TAB 500 MG 1000 MG: 500 CHEW TAB at 09:02

## 2020-02-08 RX ADMIN — DEXTROSE AND SODIUM CHLORIDE: 5; 900 INJECTION, SOLUTION INTRAVENOUS at 09:30

## 2020-02-08 RX ADMIN — MAGNESIUM SULFATE 2 G: 2 INJECTION INTRAVENOUS at 09:03

## 2020-02-08 RX ADMIN — AMIODARONE HYDROCHLORIDE 1 MG/MIN: 50 INJECTION, SOLUTION INTRAVENOUS at 05:55

## 2020-02-08 RX ADMIN — METOPROLOL TARTRATE 50 MG: 50 TABLET, FILM COATED ORAL at 09:02

## 2020-02-08 RX ADMIN — ENOXAPARIN SODIUM 40 MG: 40 INJECTION SUBCUTANEOUS at 09:02

## 2020-02-08 RX ADMIN — SIMVASTATIN 20 MG: 20 TABLET, FILM COATED ORAL at 21:43

## 2020-02-08 RX ADMIN — DEXTROSE AND SODIUM CHLORIDE: 5; 900 INJECTION, SOLUTION INTRAVENOUS at 00:11

## 2020-02-08 RX ADMIN — POTASSIUM CHLORIDE 20 MEQ: 750 TABLET, EXTENDED RELEASE ORAL at 07:10

## 2020-02-08 RX ADMIN — POTASSIUM PHOSPHATE, MONOBASIC AND POTASSIUM PHOSPHATE, DIBASIC 20 MMOL: 224; 236 INJECTION, SOLUTION INTRAVENOUS at 10:37

## 2020-02-08 RX ADMIN — AMIODARONE HYDROCHLORIDE 1 MG/MIN: 50 INJECTION, SOLUTION INTRAVENOUS at 00:12

## 2020-02-08 RX ADMIN — POTASSIUM PHOSPHATE, MONOBASIC AND POTASSIUM PHOSPHATE, DIBASIC 20 MMOL: 224; 236 INJECTION, SOLUTION INTRAVENOUS at 21:44

## 2020-02-08 RX ADMIN — ENOXAPARIN SODIUM 40 MG: 40 INJECTION SUBCUTANEOUS at 21:44

## 2020-02-08 RX ADMIN — AMIODARONE HYDROCHLORIDE 0.5 MG/MIN: 50 INJECTION, SOLUTION INTRAVENOUS at 13:42

## 2020-02-08 RX ADMIN — DEXTROSE AND SODIUM CHLORIDE 125 ML/HR: 5; 900 INJECTION, SOLUTION INTRAVENOUS at 17:55

## 2020-02-08 ASSESSMENT — ACTIVITIES OF DAILY LIVING (ADL)
ADLS_ACUITY_SCORE: 14
PREVIOUS_RESPONSIBILITIES: MEAL PREP;HOUSEKEEPING;LAUNDRY;SHOPPING;YARDWORK;MEDICATION MANAGEMENT;FINANCES;DRIVING;WORK
ADLS_ACUITY_SCORE: 14

## 2020-02-08 ASSESSMENT — PAIN DESCRIPTION - DESCRIPTORS: DESCRIPTORS: ACHING

## 2020-02-08 NOTE — PLAN OF CARE
Discharge Planner OT   Patient plan for discharge: son's home for as long as needed  Current status: Evaluation completed and treatment initiated. Pt up walking in hallway with SBA, tolerating well on room air, VSS. Completed toileting SBA  Barriers to return to prior living situation: medical status, abdominal precautions  Recommendations for discharge: To son's home with assist from family prn  Rationale for recommendations: Pt progressing well with OOB activity, very motivated with strong family support       Entered by: Olivia Mason 02/08/2020 3:35 PM

## 2020-02-08 NOTE — PROGRESS NOTES
"SURG ONC NOTE    Pt reports he is doing ok. No flatus. Feels bloated and has \"heartburn\". Mild SOB.    BP (!) 146/76   Pulse 146   Temp 98.3  F (36.8  C) (Oral)   Resp 16   Ht 1.854 m (6' 1\")   Wt 115.9 kg (255 lb 8.2 oz)   SpO2 97%   BMI 33.71 kg/m    NAD  NLB  Abd soft, mod dist, nonttp  Incision c/d/i with glue  Drain with light serosang fluid    Drain 250/150  UOP 1.1L + x2    Phos 1.3  INR 1.52  WBC 13 < 18  T bili 2.8 <3    A/P: POD3 s/p right hepatectomy. Postop course c/b a fib RVR, now rate controlled on metoprolol po and amio gtt.  -Sips until ROBF, MIVF 125 ml/hr  -Dilaudid PCA   -A fib: amio gtt taper per Cards - appreciate recs, po metoprolol 50 mg bid, full anticoagulation pending advice of Cards team  -Ppx: ambulate, PT/OT, lovenox bid, IS  -Transfer to tele floor for ongoing amio gtt when bed available    Corrie Granger MD MPH ([id number])  Surgery        "

## 2020-02-08 NOTE — PLAN OF CARE
Pt had dilaudid PCA pump [id number]. Ambulated in hallway today, was going to go for another walk this afternoon but pt went into afib with RVR. Metoprolol IV given. Pt did not respond to that. Albumin and amio bolus given, pt back on amio gtt at 1mg/min. Pt asymptomatic. Pt on 1L NC. Tried to turn off oxygen a couple times but pt desats shortly after. No Bm this shift, not passing gas. Wood out this afternoon and pt already voided this evening. Pt on clear liq diet, per team not to advance at this time. Pt was supposed to transfer today but now that pt is on amio gtt, awaiting for a floor that accepts pt on amio gtt. Continue POC, notify MD for any changes or concern.

## 2020-02-08 NOTE — PROGRESS NOTES
Remains Afib , denies pain/nausea. SBP/MAP goal maintained. No changes on Amio gtt. Passing flatus per pt. Transfer to floor today? Continue POC.

## 2020-02-08 NOTE — PLAN OF CARE
Discharge Planner PT   Patient plan for discharge: not discussed today     Current status: Pt with inc nausea upon sitting up today, resolved with rest. He ambulated > 500' with FWW and CGA-min A with 3 seated rest break. O2 dipping to ~86% on RA but recovering in 1-2 mins rest.     Barriers to return to prior living situation: medical needs, current mobility, level of A    Recommendations for discharge: pending LOS and ongoing progress, rehab vs home with 24 hr A.     Rationale for recommendations:  Pt would benefit from ongoing therapy to continue to safely progress functional mobility and promote improvement in activity tolerance and strength.        Entered by: Rosario Mazariegos 02/08/2020 1:31 PM

## 2020-02-08 NOTE — CONSULTS
Cardiology Inpatient Consultation  February 8, 2020    Reason for Consult:  A cardiology consult was requested to provide clinical guidance regarding Afib.    Assessment and Recommendation:  Ishan Arroyo is a 59 year old male history of HTN, HLD, colon cancer with metastasis to liver, s/p segmental colectomy 1/22/19 s/p right portal v embolization 10/2019, s/p 13 cycles of platinum based chemotherapy, s/p right hepatectomy on 2/5/2020 complicated by post operative Afib.    #Post-op Afib  #Metastatic colon cancer s/p right hepatectomy on 2/5/2020  1. Continue amiodarone load then discontinue once a total of 24 hours on the amiodarone ggt (ordered). Given underlying liver disease, we would like to avoid chronic amiodarone use if possible.  2. Increase metoprolol to 75mg BID (ordered for you), which should provide better rate control if he reverts to Afib. Can increase up to 100mg BID if he reverts to Afib and needs better rate control and BP tolerates. If metoprolol is insufficient for rate control, we will revisit daily dosing of oral amiodarone  3. OEFYR1GHGt =1 (HTN), therefore, no anticoagulation indicated at this time.    Patient seen and discussed with Dr. Galvan, who agrees with above plan.    Thank you for consulting the cardiovascular services at the [facility]. Please do not hesitate to call us with any questions.     Hanh Shen MD  Cardiology Fellow    HPI:   Ishan Arroyo is a 59 year old male history of HTN, HLD, colon cancer with metastasis to liver, s/p segmental colectomy 1/22/19 s/p s/p right portal v embolization 10/2019, 13 cycles of platinum based chemotherapy, s/p right hepatectomy on 2/5/2020 complicated by post operative Afib.    Post operatively he had persistent tachycardia (sinus with rates in the 120s) and hypotension requiring ICU admission, phenylephrine, and 2L of NS with improvement in rate. Evening of 2/6/2020 he developed A fib w/RVR with rates  to the 170s. He was given amiodarone 150mg bolus and started on continuous rate gtt at 1mg/min, digoxin 500 mcg and fluid resuscitation with albumin. He was already on his home metoprolol 50mg BID. He converted back to NSR within 8 hours. He reverted to Afib on 2/7 around 4PM with rates in the 130s and was given another 50mg amiodarone and gtt was continued at 1mg/min. He spontaneously converted to NSR around 7AM on 2/8.    Patient denies any symptoms of palpitations, chest pain, or dizziness with his Afib episodes.    At the time of interview, the patient denies chest pain, dyspnea at rest or with exertion, orthopnea, PND, palpitations, lightheadedness, or syncope.     Review of Systems:    Complete review of systems was performed and negative except per HPI.    PMH:  Past Medical History:   Diagnosis Date     Colon cancer metastasized to liver (H) 1/22/2019     Essential hypertension 1/4/2013     High cholesterol 11/7/2019     Iron deficiency anemia 1/11/2019     Nephrolithiasis 6/11/2019     Active Problems:  Patient Active Problem List    Diagnosis Date Noted     Liver lesion, right lobe 02/05/2020     Priority: Medium     Metastatic colon cancer to liver (H) 11/19/2019     Priority: Medium     Added automatically from request for surgery [id number]       High cholesterol 11/07/2019     Priority: Medium     Preoperative examination 09/17/2019     Priority: Medium     Health care maintenance 07/01/2019     Priority: Medium     Nephrolithiasis 06/11/2019     Priority: Medium     Colon cancer metastasized to liver (H) 01/22/2019     Priority: Medium     Malignant neoplasm of colon (H) 01/22/2019     Priority: Medium     Mets to liver, 1/2019, outside system       Iron deficiency anemia 01/11/2019     Priority: Medium     Essential hypertension 01/04/2013     Priority: Medium     Social History:  Social History     Tobacco Use     Smoking status: Never Smoker     Smokeless tobacco: Current User     Types: Chew      "Tobacco comment: chews occasionally   Substance Use Topics     Alcohol use: Yes     Comment: occ     Drug use: None     Family History:  Family History   Problem Relation Age of Onset     Coronary Artery Disease Father      Hyperlipidemia Father      Hypertension Father        Medications:    [START ON 2/9/2020] amiodarone  400 mg Oral BID     enoxaparin ANTICOAGULANT  40 mg Subcutaneous Q24H     influenza vaccine adult (product based on age)  0.5 mL Intramuscular Prior to discharge     insulin aspart  1-6 Units Subcutaneous Q4H     metoprolol tartrate  50 mg Oral BID     simvastatin  20 mg Oral At Bedtime     sodium chloride (PF)  3 mL Intracatheter Q8H         amiodarone 1 mg/min (02/08/20 0600)     bupivacaine liposome (EXPAREL) LONG ACTING injection was administered into the infiltration site to produce postsurgical analgesia. Duration of action is up to 72 hours, and other \"jose\" medications should not be given for 96 hours with the exception of the lidocaine 5% patch (LIDODERM) and the lidocaine 10mg in potassium infusions. This entry is for INFORMATION ONLY.       dextrose 5% and 0.9% NaCl 125 mL/hr at 02/08/20 0600     HYDROmorphone         Physical Exam:  Temp:  [97.9  F (36.6  C)-98.8  F (37.1  C)] 98.3  F (36.8  C)  Pulse:  [] 146  Heart Rate:  [] 98  Resp:  [14-18] 16  BP: ()/(63-89) 146/76  SpO2:  [93 %-99 %] 97 %    Intake/Output Summary (Last 24 hours) at 2/8/2020 0645  Last data filed at 2/8/2020 0600  Gross per 24 hour   Intake 4388 ml   Output 715 ml   Net 3673 ml     GEN: pleasant, no acute distress  HEENT: no icterus  CV: RRR, normal s1/s2, no murmurs/rubs/s3/s4, no heave. JVP normal.   CHEST: clear to ausculation bilaterally, no rales or wheezing  ABD: soft, non-tender, normal active bowel sounds. SUMMER drain with serosanginous fluid  EXTR: pulses 2+. No clubbing, cyanosis or edema.   NEURO: alert oriented, speech fluent/appropriate, motor grossly nonfocal      Diagnostics:  All " labs and imaging were reviewed, of note:    CMP  Recent Labs   Lab 20  03520  0350 20  2045    136 136 139 140   POTASSIUM 3.5 4.2 4.9 4.4 4.3   CHLORIDE 104 106 106 109 110*   CO2 28 26 28 26 24   ANIONGAP 3 4 2* 4 6   * 229* 129* 155* 138*   BUN 6* 13 17 10 10   CR 0.51* 0.72 1.24 0.48* 0.50*   GFRESTIMATED >90 >90 64 >90 >90   GFRESTBLACK >90 >90 74 >90 >90   IRENE 7.8* 7.8* 8.2* 8.5 8.7   MAG 1.9 2.2 2.0 2.0  --    PHOS 1.3* 2.0* 3.7 2.7  --    PROTTOTAL 5.3* 5.5*  --  6.3* 6.6*   ALBUMIN 2.6* 2.8*  --  3.2* 3.3*   BILITOTAL 2.8* 3.0*  --  3.8* 3.5*   ALKPHOS 69 67  --  73 78   * 301*  --  507* 488*   * 325*  --  416* 364*     CBC  Recent Labs   Lab 20  0350   WBC 13.3* 18.3* 23.8* 19.4*   RBC 3.80* 3.78* 4.58 4.68   HGB 10.9* 11.2* 13.6 13.6   HCT 34.4* 34.8* 42.5 42.3   MCV 91 92 93 90   MCH 28.7 29.6 29.7 29.1   MCHC 31.7 32.2 32.0 32.2   RDW 16.3* 16.8* 17.1* 16.9*    158 285 204     INR  Recent Labs   Lab 20  03520  0350   INR 1.52* 1.72* 1.69* 1.47*     Arterial Blood Gas  Recent Labs   Lab 20  1452 20  1336   PH 7.41 7.37   PCO2 33* 38   PO2 108* 115*   HCO3 20* 22   O2PER 40.0 40.0       Lab Results   Component Value Date    TROPI 0.032 2020    TROPI <0.015 2020       [id number]      Transthoracic echocardiogram:   2020     Interpretation Summary  Sinus Rhythm during study.  Global and regional left ventricular function is normal with an EF of 60-65%.  Right ventricular function, chamber size, wall motion, and thickness are  normal.  Right ventricular systolic pressure is 33mmHg above the right atrial pressure.  Trivial pericardial effusion is present.  A left pleural effusion is present.  Previous study not available for comparison.

## 2020-02-08 NOTE — PROGRESS NOTES
02/08/20 1503   Quick Adds   Type of Visit Initial Occupational Therapy Evaluation   Living Environment   Lives With alone   Living Arrangements house   Home Accessibility stairs to enter home   Number of Stairs, Main Entrance 4   Stair Railings, Main Entrance railing on left side (ascending)   Transportation Anticipated car, drives self   Living Environment Comment Pt can stay on main level of one story home   Self-Care   Usual Activity Tolerance good   Current Activity Tolerance moderate   Regular Exercise No   Equipment Currently Used at Home none   Activity/Exercise/Self-Care Comment full time    Functional Level   Ambulation 0-->independent   Transferring 0-->independent   Toileting 0-->independent   Bathing 0-->independent   Dressing 0-->independent   Eating 0-->independent   Communication 0-->understands/communicates without difficulty   Swallowing 0-->swallows foods/liquids without difficulty   Cognition 0 - no cognition issues reported   Fall history within last six months no   Which of the above functional risks had a recent onset or change? 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 Postop course c/b a fib RVR, now rate controlled    Precautions/Limitations abdominal precautions   Cognitive Status Examination   Orientation orientation to person, place and time   Level of Consciousness alert   Follows Commands (Cognition) WNL   Memory intact   Attention No deficits were identified   Organization/Problem Solving No deficits were identified   Executive Function No deficits were identified   Visual Perception   Visual Perception Wears glasses   Range of Motion (ROM)   ROM Quick Adds No deficits were  identified   Strength   Manual Muscle Testing Quick Adds No deficits were identified   Hand Strength   Hand Strength Comments good grasp   Muscle Tone Assessment   Muscle Tone Quick Adds No deficits were identified   Coordination   Upper Extremity Coordination No deficits were identified   Mobility   Bed Mobility Comments NT. Education on log roll   Transfer Skill: Bed to Chair/Chair to Bed   Level of [place]: Bed to Chair stand-by assist   Transfer Skill: Sit to Stand   Level of [place]: Sit/Stand stand-by assist   Transfer Skill: Toilet Transfer   Level of [place]: Toilet stand-by assist   Physical Assist/Nonphysical Assist: Toilet verbal cues   Assistive Device standard walker;grab bars   Balance   Balance Quick Add No deficits identified   Bathing   Level of [place] unable to perform   Upper Body Dressing   Level of [place]: Dress Upper Body independent   Lower Body Dressing   Level of [place]: Dress Lower Body unable to perform   Toileting   Level of [place]: Toilet   (mod I )   Grooming   Level of [place]: Grooming independent   Eating/Self Feeding   Level of [place]: Eating independent   Instrumental Activities of Daily Living (IADL)   Previous Responsibilities meal prep;housekeeping;laundry;shopping;yardwork;medication management;finances;driving;work   Activities of Daily Living Analysis   Impairments Contributing to Impaired Activities of Daily Living pain;post surgical precautions;strength decreased   General Therapy Interventions   Planned Therapy Interventions ADL retraining;IADL retraining;transfer training;progressive activity/exercise;home program guidelines   Clinical Impression   Criteria for Skilled Therapeutic Interventions Met yes, treatment indicated   OT Diagnosis post op weakness, decreased I with ADLs   Influenced by the following impairments abdominal precautions   Assessment of Occupational Performance 3-5 Performance Deficits   Identified  "Performance Deficits decreased dressing, showering, home mgmt, work skills   Clinical Decision Making (Complexity) Low complexity   Therapy Frequency 5x/week   Predicted Duration of Therapy Intervention (days/wks) 2-3 sessions   Anticipated Discharge Disposition Home with Assist   Risks and Benefits of Treatment have been explained. Yes   Patient, Family & other staff in agreement with plan of care Yes   [facility] TM \"6 Clicks\"   2016, Trustees of [facility], under license to [employer].  All rights reserved.   6 Clicks Short Forms Daily Activity Inpatient Short Form   [facility]  \"6 Clicks\" Daily Activity Inpatient Short Form   1. Putting on and taking off regular lower body clothing? 3 - A Little   2. Bathing (including washing, rinsing, drying)? 3 - A Little   3. Toileting, which includes using toilet, bedpan or urinal? 4 - None   4. Putting on and taking off regular upper body clothing? 4 - None   5. Taking care of personal grooming such as brushing teeth? 4 - None   6. Eating meals? 4 - None   Daily Activity Raw Score (Score out of 24.Lower scores equate to lower levels of function) 22   Total Evaluation Time   Total Evaluation Time (Minutes) 10     "

## 2020-02-09 LAB
ALBUMIN SERPL-MCNC: 2.4 G/DL (ref 3.4–5)
ALP SERPL-CCNC: 73 U/L (ref 40–150)
ALT SERPL W P-5'-P-CCNC: 155 U/L (ref 0–70)
ANION GAP SERPL CALCULATED.3IONS-SCNC: 3 MMOL/L (ref 3–14)
AST SERPL W P-5'-P-CCNC: 86 U/L (ref 0–45)
BILIRUB SERPL-MCNC: 2.3 MG/DL (ref 0.2–1.3)
BUN SERPL-MCNC: 5 MG/DL (ref 7–30)
CALCIUM SERPL-MCNC: 7.8 MG/DL (ref 8.5–10.1)
CHLORIDE SERPL-SCNC: 106 MMOL/L (ref 94–109)
CO2 SERPL-SCNC: 28 MMOL/L (ref 20–32)
CREAT SERPL-MCNC: 0.5 MG/DL (ref 0.66–1.25)
ERYTHROCYTE [DISTWIDTH] IN BLOOD BY AUTOMATED COUNT: 16.6 % (ref 10–15)
GFR SERPL CREATININE-BSD FRML MDRD: >90 ML/MIN/{1.73_M2}
GLUCOSE BLDC GLUCOMTR-MCNC: 107 MG/DL (ref 70–99)
GLUCOSE BLDC GLUCOMTR-MCNC: 108 MG/DL (ref 70–99)
GLUCOSE BLDC GLUCOMTR-MCNC: 110 MG/DL (ref 70–99)
GLUCOSE BLDC GLUCOMTR-MCNC: 119 MG/DL (ref 70–99)
GLUCOSE BLDC GLUCOMTR-MCNC: 128 MG/DL (ref 70–99)
GLUCOSE BLDC GLUCOMTR-MCNC: 93 MG/DL (ref 70–99)
GLUCOSE BLDC GLUCOMTR-MCNC: 94 MG/DL (ref 70–99)
GLUCOSE SERPL-MCNC: 119 MG/DL (ref 70–99)
HCT VFR BLD AUTO: 33.1 % (ref 40–53)
HGB BLD-MCNC: 11 G/DL (ref 13.3–17.7)
INR PPP: 1.5 (ref 0.86–1.14)
MAGNESIUM SERPL-MCNC: 1.8 MG/DL (ref 1.6–2.3)
MAGNESIUM SERPL-MCNC: 2 MG/DL (ref 1.6–2.3)
MCH RBC QN AUTO: 30.4 PG (ref 26.5–33)
MCHC RBC AUTO-ENTMCNC: 33.2 G/DL (ref 31.5–36.5)
MCV RBC AUTO: 91 FL (ref 78–100)
PHOSPHATE SERPL-MCNC: 1.9 MG/DL (ref 2.5–4.5)
PHOSPHATE SERPL-MCNC: 2.2 MG/DL (ref 2.5–4.5)
PLATELET # BLD AUTO: 158 10E9/L (ref 150–450)
POTASSIUM SERPL-SCNC: 3.1 MMOL/L (ref 3.4–5.3)
POTASSIUM SERPL-SCNC: 3.1 MMOL/L (ref 3.4–5.3)
PROT SERPL-MCNC: 5.4 G/DL (ref 6.8–8.8)
RBC # BLD AUTO: 3.62 10E12/L (ref 4.4–5.9)
SODIUM SERPL-SCNC: 137 MMOL/L (ref 133–144)
WBC # BLD AUTO: 9.1 10E9/L (ref 4–11)

## 2020-02-09 PROCEDURE — 12000001 ZZH R&B MED SURG/OB UMMC

## 2020-02-09 PROCEDURE — 83735 ASSAY OF MAGNESIUM: CPT | Performed by: SURGERY

## 2020-02-09 PROCEDURE — 25000128 H RX IP 250 OP 636: Performed by: STUDENT IN AN ORGANIZED HEALTH CARE EDUCATION/TRAINING PROGRAM

## 2020-02-09 PROCEDURE — 25000132 ZZH RX MED GY IP 250 OP 250 PS 637: Performed by: SURGERY

## 2020-02-09 PROCEDURE — 80053 COMPREHEN METABOLIC PANEL: CPT | Performed by: SURGERY

## 2020-02-09 PROCEDURE — 83735 ASSAY OF MAGNESIUM: CPT | Performed by: STUDENT IN AN ORGANIZED HEALTH CARE EDUCATION/TRAINING PROGRAM

## 2020-02-09 PROCEDURE — 84132 ASSAY OF SERUM POTASSIUM: CPT | Performed by: STUDENT IN AN ORGANIZED HEALTH CARE EDUCATION/TRAINING PROGRAM

## 2020-02-09 PROCEDURE — 36415 COLL VENOUS BLD VENIPUNCTURE: CPT | Performed by: SURGERY

## 2020-02-09 PROCEDURE — 84100 ASSAY OF PHOSPHORUS: CPT | Performed by: STUDENT IN AN ORGANIZED HEALTH CARE EDUCATION/TRAINING PROGRAM

## 2020-02-09 PROCEDURE — 25800030 ZZH RX IP 258 OP 636: Performed by: STUDENT IN AN ORGANIZED HEALTH CARE EDUCATION/TRAINING PROGRAM

## 2020-02-09 PROCEDURE — 84100 ASSAY OF PHOSPHORUS: CPT | Performed by: SURGERY

## 2020-02-09 PROCEDURE — 36415 COLL VENOUS BLD VENIPUNCTURE: CPT | Performed by: STUDENT IN AN ORGANIZED HEALTH CARE EDUCATION/TRAINING PROGRAM

## 2020-02-09 PROCEDURE — 85027 COMPLETE CBC AUTOMATED: CPT | Performed by: SURGERY

## 2020-02-09 PROCEDURE — 25000125 ZZHC RX 250: Performed by: STUDENT IN AN ORGANIZED HEALTH CARE EDUCATION/TRAINING PROGRAM

## 2020-02-09 PROCEDURE — 00000146 ZZHCL STATISTIC GLUCOSE BY METER IP

## 2020-02-09 PROCEDURE — 25000132 ZZH RX MED GY IP 250 OP 250 PS 637: Performed by: STUDENT IN AN ORGANIZED HEALTH CARE EDUCATION/TRAINING PROGRAM

## 2020-02-09 PROCEDURE — 85610 PROTHROMBIN TIME: CPT | Performed by: SURGERY

## 2020-02-09 RX ORDER — METOPROLOL TARTRATE 50 MG
100 TABLET ORAL 2 TIMES DAILY
Status: DISCONTINUED | OUTPATIENT
Start: 2020-02-09 | End: 2020-02-11 | Stop reason: HOSPADM

## 2020-02-09 RX ADMIN — POTASSIUM CHLORIDE 20 MEQ: 750 TABLET, EXTENDED RELEASE ORAL at 21:24

## 2020-02-09 RX ADMIN — SIMVASTATIN 20 MG: 20 TABLET, FILM COATED ORAL at 21:19

## 2020-02-09 RX ADMIN — MAGNESIUM SULFATE 2 G: 2 INJECTION INTRAVENOUS at 08:17

## 2020-02-09 RX ADMIN — POTASSIUM CHLORIDE 40 MEQ: 750 TABLET, EXTENDED RELEASE ORAL at 19:38

## 2020-02-09 RX ADMIN — METOPROLOL TARTRATE 100 MG: 50 TABLET, FILM COATED ORAL at 19:37

## 2020-02-09 RX ADMIN — POTASSIUM PHOSPHATE, MONOBASIC AND POTASSIUM PHOSPHATE, DIBASIC 15 MMOL: 224; 236 INJECTION, SOLUTION INTRAVENOUS at 23:44

## 2020-02-09 RX ADMIN — POTASSIUM CHLORIDE 20 MEQ: 750 TABLET, EXTENDED RELEASE ORAL at 13:38

## 2020-02-09 RX ADMIN — POTASSIUM PHOSPHATE, MONOBASIC AND POTASSIUM PHOSPHATE, DIBASIC 20 MMOL: 224; 236 INJECTION, SOLUTION INTRAVENOUS at 09:44

## 2020-02-09 RX ADMIN — METOPROLOL TARTRATE 100 MG: 50 TABLET, FILM COATED ORAL at 07:51

## 2020-02-09 RX ADMIN — POTASSIUM CHLORIDE 40 MEQ: 750 TABLET, EXTENDED RELEASE ORAL at 08:17

## 2020-02-09 RX ADMIN — DEXTROSE AND SODIUM CHLORIDE: 5; 900 INJECTION, SOLUTION INTRAVENOUS at 01:35

## 2020-02-09 RX ADMIN — ENOXAPARIN SODIUM 40 MG: 40 INJECTION SUBCUTANEOUS at 21:19

## 2020-02-09 ASSESSMENT — ACTIVITIES OF DAILY LIVING (ADL)
ADLS_ACUITY_SCORE: 13
ADLS_ACUITY_SCORE: 15
ADLS_ACUITY_SCORE: 13

## 2020-02-09 ASSESSMENT — PAIN DESCRIPTION - DESCRIPTORS
DESCRIPTORS: ACHING

## 2020-02-09 ASSESSMENT — MIFFLIN-ST. JEOR: SCORE: 2003.34

## 2020-02-09 NOTE — PLAN OF CARE
Pt neuros intact. Ambulated in hallway twice. Amio gtt off at 1700. Pt did go in and out of a fib a couple times HR as high as 120, asymptomatic; pt currently sinus rhythm with frequent PVCs and PACs. Pt now on room air with good sats 95 >. Pt had two small BM today, updated primary team and did request if pt can go back to full liquid diet, team has not called back; paged twice. Still awaiting bed on 6B. Continue POC, notify MD for any changes or concern.

## 2020-02-09 NOTE — PLAN OF CARE
D/I: Pt up in chair and ambulated in halls with SBA. Minimal c/o abd pain. Pt continues on PCA dilaudid per MD. Right abd SUMMER (serous/serosang.) Drain output per I&O.   A: VSS.  NSR 70s... Pt tolerating regular diet w/o any issues. Pt voiding (some not saved)  Loose BM x 2 today.   P: Plan to transfer to the floor when bed is available. See flow sheet for details.

## 2020-02-09 NOTE — PROGRESS NOTES
"SURG ONC NOTE    Pt reports he is feeling good. Had multiple BMs. Hungry.     BP (!) 140/89   Pulse 146   Temp 98.3  F (36.8  C) (Oral)   Resp 18   Ht 1.854 m (6' 1\")   Wt 112.9 kg (249 lb)   SpO2 99%   BMI 32.85 kg/m    NAD  NLB  Abd soft, mild dist, nonttp  Incision c/d/i with glue  Drain with light serosang fluid    Drain 650    WBC 9.1 < 13 < 18  T bili 2.3 < 2.8 <3    A/P: POD4 s/p right hepatectomy. Postop course c/b a fib RVR, now rate controlled on metoprolol po and amio gtt.  -Regular diet, discontinue IVF  -Dilaudid PCA - plan transition to oral/iv prn options tomorrow if tolerates diet today  -A fib: NSR on tele this am, appreciate Cards recs, amio gtt ended yesterday evening - do not recommend oral amio taper, increased po metoprolol 100 mg bid, full anticoagulation not advised given CHADS score of 1  -Ppx: ambulate, PT/OT, lovenox daily, IS  -Transfer to tele floor when bed available    Corrie Granger MD MPH ([id number])  Surgery        "

## 2020-02-09 NOTE — PLAN OF CARE
Major Shift Events: neuro intact, afeb, HR 70- 80's, slept b/w cares. Up w/ SBA- BM x3, voiding. MIV infusing per order. CLD- tolerating it well. R abd incision- derm/ arthur/ cdi. SUMMER w/ 80- 100 ml q2h. Phos 1.5- replaced- recheck pending.  Plan: advance diet, pending tx to 6B w/ bed availability.  For vital signs and complete assessments, please see documentation flowsheets.

## 2020-02-10 ENCOUNTER — APPOINTMENT (OUTPATIENT)
Dept: OCCUPATIONAL THERAPY | Facility: CLINIC | Age: 60
DRG: 406 | End: 2020-02-10
Attending: SURGERY
Payer: COMMERCIAL

## 2020-02-10 PROBLEM — D64.9 ANEMIA: Status: ACTIVE | Noted: 2020-02-10

## 2020-02-10 PROBLEM — I48.91 ATRIAL FIBRILLATION WITH RVR (H): Status: ACTIVE | Noted: 2020-02-10

## 2020-02-10 PROBLEM — E83.39 HYPOPHOSPHATEMIA: Status: ACTIVE | Noted: 2020-02-10

## 2020-02-10 PROBLEM — E87.6 HYPOKALEMIA: Status: ACTIVE | Noted: 2020-02-10

## 2020-02-10 PROBLEM — E83.42 HYPOMAGNESEMIA: Status: ACTIVE | Noted: 2020-02-10

## 2020-02-10 LAB
ALBUMIN SERPL-MCNC: 2.4 G/DL (ref 3.4–5)
ALP SERPL-CCNC: 79 U/L (ref 40–150)
ALT SERPL W P-5'-P-CCNC: 116 U/L (ref 0–70)
ANION GAP SERPL CALCULATED.3IONS-SCNC: 7 MMOL/L (ref 3–14)
AST SERPL W P-5'-P-CCNC: 57 U/L (ref 0–45)
BILIRUB SERPL-MCNC: 2.1 MG/DL (ref 0.2–1.3)
BUN SERPL-MCNC: 5 MG/DL (ref 7–30)
CALCIUM SERPL-MCNC: 7.9 MG/DL (ref 8.5–10.1)
CHLORIDE SERPL-SCNC: 107 MMOL/L (ref 94–109)
CO2 SERPL-SCNC: 26 MMOL/L (ref 20–32)
CREAT SERPL-MCNC: 0.49 MG/DL (ref 0.66–1.25)
ERYTHROCYTE [DISTWIDTH] IN BLOOD BY AUTOMATED COUNT: 16.8 % (ref 10–15)
GFR SERPL CREATININE-BSD FRML MDRD: >90 ML/MIN/{1.73_M2}
GLUCOSE BLDC GLUCOMTR-MCNC: 91 MG/DL (ref 70–99)
GLUCOSE SERPL-MCNC: 101 MG/DL (ref 70–99)
HCT VFR BLD AUTO: 32.6 % (ref 40–53)
HGB BLD-MCNC: 10.6 G/DL (ref 13.3–17.7)
INR PPP: 1.41 (ref 0.86–1.14)
INTERPRETATION ECG - MUSE: NORMAL
INTERPRETATION ECG - MUSE: NORMAL
MAGNESIUM SERPL-MCNC: 1.8 MG/DL (ref 1.6–2.3)
MCH RBC QN AUTO: 29.8 PG (ref 26.5–33)
MCHC RBC AUTO-ENTMCNC: 32.5 G/DL (ref 31.5–36.5)
MCV RBC AUTO: 92 FL (ref 78–100)
PHOSPHATE SERPL-MCNC: 2.6 MG/DL (ref 2.5–4.5)
PLATELET # BLD AUTO: 166 10E9/L (ref 150–450)
POTASSIUM SERPL-SCNC: 3.4 MMOL/L (ref 3.4–5.3)
PROT SERPL-MCNC: 5.4 G/DL (ref 6.8–8.8)
RBC # BLD AUTO: 3.56 10E12/L (ref 4.4–5.9)
SODIUM SERPL-SCNC: 140 MMOL/L (ref 133–144)
WBC # BLD AUTO: 8 10E9/L (ref 4–11)

## 2020-02-10 PROCEDURE — 80053 COMPREHEN METABOLIC PANEL: CPT | Performed by: SURGERY

## 2020-02-10 PROCEDURE — 00000146 ZZHCL STATISTIC GLUCOSE BY METER IP

## 2020-02-10 PROCEDURE — 84100 ASSAY OF PHOSPHORUS: CPT | Performed by: SURGERY

## 2020-02-10 PROCEDURE — 85610 PROTHROMBIN TIME: CPT | Performed by: SURGERY

## 2020-02-10 PROCEDURE — 99024 POSTOP FOLLOW-UP VISIT: CPT | Performed by: PHYSICIAN ASSISTANT

## 2020-02-10 PROCEDURE — 25000128 H RX IP 250 OP 636: Performed by: STUDENT IN AN ORGANIZED HEALTH CARE EDUCATION/TRAINING PROGRAM

## 2020-02-10 PROCEDURE — 83735 ASSAY OF MAGNESIUM: CPT | Performed by: SURGERY

## 2020-02-10 PROCEDURE — 12000001 ZZH R&B MED SURG/OB UMMC

## 2020-02-10 PROCEDURE — 97530 THERAPEUTIC ACTIVITIES: CPT | Mod: GO

## 2020-02-10 PROCEDURE — 97535 SELF CARE MNGMENT TRAINING: CPT | Mod: GO

## 2020-02-10 PROCEDURE — 25000132 ZZH RX MED GY IP 250 OP 250 PS 637: Performed by: STUDENT IN AN ORGANIZED HEALTH CARE EDUCATION/TRAINING PROGRAM

## 2020-02-10 PROCEDURE — 36415 COLL VENOUS BLD VENIPUNCTURE: CPT | Performed by: SURGERY

## 2020-02-10 PROCEDURE — 25000132 ZZH RX MED GY IP 250 OP 250 PS 637: Performed by: SURGERY

## 2020-02-10 PROCEDURE — 85027 COMPLETE CBC AUTOMATED: CPT | Performed by: SURGERY

## 2020-02-10 RX ORDER — HYDROMORPHONE HCL/0.9% NACL/PF 0.2MG/0.2
0.2 SYRINGE (ML) INTRAVENOUS
Status: DISCONTINUED | OUTPATIENT
Start: 2020-02-10 | End: 2020-02-11 | Stop reason: HOSPADM

## 2020-02-10 RX ORDER — OXYCODONE HYDROCHLORIDE 5 MG/1
5-10 TABLET ORAL EVERY 4 HOURS PRN
Status: DISCONTINUED | OUTPATIENT
Start: 2020-02-10 | End: 2020-02-11 | Stop reason: HOSPADM

## 2020-02-10 RX ADMIN — ENOXAPARIN SODIUM 40 MG: 40 INJECTION SUBCUTANEOUS at 20:49

## 2020-02-10 RX ADMIN — METOPROLOL TARTRATE 100 MG: 50 TABLET, FILM COATED ORAL at 20:48

## 2020-02-10 RX ADMIN — MAGNESIUM SULFATE 2 G: 2 INJECTION INTRAVENOUS at 06:43

## 2020-02-10 RX ADMIN — SIMVASTATIN 20 MG: 20 TABLET, FILM COATED ORAL at 21:05

## 2020-02-10 RX ADMIN — POTASSIUM CHLORIDE 20 MEQ: 750 TABLET, EXTENDED RELEASE ORAL at 06:43

## 2020-02-10 RX ADMIN — METOPROLOL TARTRATE 100 MG: 50 TABLET, FILM COATED ORAL at 07:49

## 2020-02-10 ASSESSMENT — ACTIVITIES OF DAILY LIVING (ADL)
ADLS_ACUITY_SCORE: 13
ADLS_ACUITY_SCORE: 11

## 2020-02-10 ASSESSMENT — PAIN DESCRIPTION - DESCRIPTORS: DESCRIPTORS: ACHING

## 2020-02-10 NOTE — PLAN OF CARE
NEURO: Neurologically intact; Up independently in room and bathroom. Denies nay surgical pain, PCA Dilaudid discontinued    CV: NSR with rare PACs. MAP>65. Afebrile    PULM: On RA with no c/o resp distress    GI/: Fair but improving appetite. 3 loose BM so far. Denies nausea. Voids spont with good UOP    SKIN: Surgical incision WILLOW and intact    LINES/DRAINS: PIV saline locked. SUMMER-drain with moderate serosangenous drainage    LYTES: K+ and Mag replaced, recheck tomorrow AM    PLAN: Possible discharge tomorrow. Pt has transfer  Orders to tele unit. Continue to monitor and with POC, update team with changes/concerns.

## 2020-02-10 NOTE — PLAN OF CARE
Major Shift Events: Assumed care of patient at 0300. One run of VT around 0345, 8 beats. Otherwise stable, awaiting morning labs. Will replace electrolytes per protocol if indicated.   Plan: Continue to monitor while awaiting bed on 6B/C.  For vital signs and complete assessments, please see documentation flowsheets.

## 2020-02-10 NOTE — PROGRESS NOTES
Major Shift Events: Couplet PVC's x3 once, intermittently HTN, PCA in use for pain. K+ & Phos replaced per protocol- recheck pending. One loose stool overnight. Adequate UOP.  Plan: monitor electrolytes, BP & heart rhythms. Notify team w/ acute changes. tx to unit w/ bed availability.   For vital signs and complete assessments, please see documentation flowsheets.

## 2020-02-10 NOTE — DISCHARGE SUMMARY
[facility] Discharge Summary    Ishan Arrooy MRN# [id number]   Age: 59 year old YOB: 1960     Date of Admission:  2/5/2020  Date of Discharge::  2/11/2020  2:41 PM  Admitting Physician:  Lele Luong MD  Discharge Physician:  Lele Luong MD     PCP:  Goran Lopez    Disposition: Patient discharged to home in stable condition.    Admission Diagnosis:  Colon cancer metastasized to the liver  Hypertension  High cholesterol  Nephrolithiasis     Discharge Diagnosis:  Colon cancer metastasized to the liver  Atrial fibrillation with RVR  Anemia  Hypokalemia  Hypophosphatemia  Hypomagnesemia  Hypertension  High cholesterol  Nephrolithiasis     Discharge medications  Discharge Medication List as of 2/11/2020  1:24 PM      START taking these medications    Details   enoxaparin ANTICOAGULANT (LOVENOX) 40 MG/0.4ML syringe Inject 0.4 mLs (40 mg) Subcutaneous every 24 hours for 25 days, Disp-10 mL, R-0, E-Prescribe      metoprolol tartrate (LOPRESSOR) 100 MG tablet Take 1 tablet (100 mg) by mouth 2 times daily, Disp-30 tablet, R-0, E-Prescribe      oxyCODONE (ROXICODONE) 5 MG tablet Take 1 tablet (5 mg) by mouth every 6 hours as needed for pain, Disp-30 tablet, R-0, Local Print         CONTINUE these medications which have NOT CHANGED    Details   acetaminophen (TYLENOL) 500 MG tablet Take 500-1,000 mg by mouth every 6 hours as needed for mild pain (Pt last took 11/6/19. Pt last dose 3 weeks ago 1/17/20) , Historical      lidocaine-prilocaine (EMLA) 2.5-2.5 % external cream Apply topically as needed (Pt. has not used since August 2019. 11/8/19) Pt has not used in since 12/30/19Historical      minocycline (DYNACIN) 100 MG tablet Take 100 mg by mouth 2 times daily Pt has only been taking 1 time daily 1/17/20, Historical      Multiple Vitamins-Minerals (MULTI VITAMIN  /MINERALS) TABS Take 1 tablet by mouth every morning , Historical      Polyethylene Glycol 3350 (MIRALAX PO) Take  1 capful by mouth as needed (pt last used approximately 1/3/20), Historical      simvastatin (ZOCOR) 20 MG tablet Take 20 mg by mouth At Bedtime , Historical         STOP taking these medications       capecitabine (XELODA) 500 MG tablet Comments:   Reason for Stopping:         dexamethasone (DECADRON) 4 MG tablet Comments:   Reason for Stopping:         metoprolol succinate ER (TOPROL-XL) 100 MG 24 hr tablet Comments:   Reason for Stopping:             Follow up, Special Instructions:  After Care     Future Labs/Procedures    Activity     Comments:    Your activity upon discharge: activity as tolerated, do not lift more than 10 lbs for 6 weeks.    Diet     Comments:    Follow this diet upon discharge: regular    Discharge Instructions     Comments:    If your travel plans upon discharge include prolonged periods of sitting (a lengthy car or plane ride), it is highly beneficial to get up and walk at least once per hour to help prevent swelling and blood clots.     You may shower after operation, let warm soapy water run over incision and pat dry. Do not submerge, soak, or scrub incision.    Take incentive spirometer home for continued frequent use    You will be discharged with narcotic pain medications. Please take them only as needed and do not operate a car or heavy machinery for 24 hours after taking them.  Narcotic pain medications like oxycodone are constipating. Please ensure that you are drinking adequate amounts of fluids and taking stool softeners while you are taking narcotics. Take Miralax as needed for constipation.     Do not drive until you can with stand pressing the brake pedal quickly and fully without pain and you are not distracted by pain.     Call for fever greater than 101.5, chills, red skin around incision, drainage from incision, increased swelling from the incision, bleeding from the incision that is not controlled with light pressure, inability to tolerate diet,new nausea/vomiting or  "other new/worsening symptoms.   You may also call the surgical oncology nurse care coordinator from 8:00am-4:30pm RAMIRO Farfan at [phone]. For after hours questions or concerns you can contact the on-call surgical oncology resident (nights and weekends [phone] and ask \"I would like to page the Surgical Oncology Resident on call.\"). In emergencies, call [phone]    Regular diet.    Follow Up:  Follow up with your primary care provider within 1 week of discharge.   Follow up with Cardiology within 1 week of discharge.   Follow up in clinic with Dr. Luong on 2/28/20 as scheduled.        Procedures:  2/5/20 Dr. Luong  1. Exploratory Laparotomy  2. Lysis of Adhesions  3. Intra-Operative Liver Ultrasound  4. Cholecystectomy  5. Right Hepatectomy  6. Air Cholangiogram    Consultations:  PHYSICAL THERAPY ADULT IP CONSULT  OCCUPATIONAL THERAPY ADULT IP CONSULT  CARDIOLOGY GENERAL ADULT IP CONSULT  CARDIOLOGY GENERAL ADULT IP CONSULT  VASCULAR ACCESS CARE ADULT IP CONSULT  VASCULAR ACCESS CARE ADULT IP CONSULT    Brief HPI:  Alin Arroyo is a 59 year old male with rectosigmoid cancer s/p resection with synchronous liver metastases s/p chemotherapy.     Hospital Course:  The patient was admitted and underwent the above procedure. The patient tolerated the procedure well. His post operative course was significant for atrial fibrillation with RVR in addition to the following.     #Rectosigmoid cancer with liver metastases now s/p right hepatectomy. SUMMER drain was removed prior to discharge despite high serous output. An ostomy appliance was placed over the drain site.   - Regular diet  - Lovenox for DVT prophylaxis for a total of 28 days.   - Oxycodone prn for pain  - Follow up with Dr. Luong 2/28.     #Atrial fibrillation with RVR, hypotension: Post operatively he was transferred to the SICU for persistent tachycardia and hypotension. He improved with fluid resuscitation. He developed Afib with RVR. Cardiology was " consulted. He was given amiodarone bolus and continuous gtt. He converted back to normal sinus rhythm within 8 hours. He reverted back to Afib and was given another amiodarone bolus. He converted to NSR. He was switched to metoprolol and increased to 100 mg BID.  Troponin negative 2/5 and 2/6. Echo unremarkable 2/7.   - Given underlying liver disease, we would like to avoid chronic amiodarone use if possible. [id number] =1 (HTN), therefore, no anticoagulation indicated at this time.  - Continue metoprolol 100 mg BID.   - Follow up with your primary care provider within 1 week.   - Follow up with Cardiology within 1 week.     #Acute blood loss anemia secondary to expected surgical blood loss and dilution. Monitored and stable.     #Hypercholesterolemia; home simvastatin restarted    Prior to discharge pain was controlled with oral pain medication and the patient was able to ambulate and void without difficulty. The patient received appropriate education post operatively including Lovenox teaching. On POD #6 the patient was discharged to home.    Surgical pathology  Pending     Ximena Hermosillo PA-C

## 2020-02-10 NOTE — PLAN OF CARE
OT 4A  Discharge Planner OT   Patient plan for discharge: Home  Current status: SBA supine to EOB. Independent sit<>stand. Pt completed functional mobility with no AD and SBA. Independent with LB dressing, toileting and washing hands at the sink.   Barriers to return to prior living situation: acute medical needs, post surgical precautions  Recommendations for discharge: Home with assist as needed for IADL completion due to precautions  Rationale for recommendations: Pt is primarily independent with ADL completion.        Entered by: Roopa Plaza 02/10/2020 9:01 AM     Occupational Therapy Discharge Summary    Reason for therapy discharge:    All goals and outcomes met, no further needs identified.    Progress towards therapy goal(s). [facility] for goal details.  Goals met    Therapy recommendation(s):    No further therapy is recommended.

## 2020-02-10 NOTE — PROGRESS NOTES
Surgery Progress Note    S:  Pt had 8 beats of VT last night around 0345, otherwise stable. Pt seen at bedside resting comfortably. Pain well controlled and has not used PCA in 2 days. No nausea/emesis, but is burping Passing flatus and having loose BMs. Voiding without issues. Tolerating regular diet, although still has little appetite. Ambulating well and able to walk to bathroom without assistance.    Denies cough, SOB, angina, fever.  O:  Temp:  [98.3  F (36.8  C)-98.6  F (37  C)] 98.3  F (36.8  C)  Heart Rate:  [66-79] 70  Resp:  [15-22] 15  BP: (115-159)/() 141/86  SpO2:  [97 %-100 %] 98 %    I/O last 3 completed shifts:  In: 2175 [P.O.:920; I.V.:1255]  Out: 1493 [Urine:560; Drains:933]    Gen: A&Ox3, NAD  Resp: non-labored breathing on room air, lungs clear to auscultation in anterior fields bilaterally.  Abd: Soft, mild distension, mild tenderness around incision site, No rebound or guarding. SUMMER drain with serosanguinous output, mostly serous.   Ext: warm and well perfused, pulses intact, ROM intact  Incisions: c/d/i    Labs:  Complete Blood Count   Recent Labs   Lab 02/10/20  0511 02/09/20  0724 02/08/20  0356 02/07/20  0439   WBC 8.0 9.1 13.3* 18.3*   HGB 10.6* 11.0* 10.9* 11.2*    158 155 158     Basic Metabolic Panel  Recent Labs   Lab 02/10/20  0511 02/09/20  1740 02/09/20  0724 02/08/20  0356 02/07/20  0439     --  137 134 136   POTASSIUM 3.4 3.1* 3.1* 3.5 4.2   CHLORIDE 107  --  106 104 106   CO2 26  --  28 28 26   BUN 5*  --  5* 6* 13   CR 0.49*  --  0.50* 0.51* 0.72   *  --  119* 134* 229*     Liver Function Tests  Recent Labs   Lab 02/10/20  0511 02/09/20  0724 02/08/20  0356 02/07/20  0439   AST 57* 86* 171* 301*   * 155* 229* 325*   ALKPHOS 79 73 69 67   BILITOTAL 2.1* 2.3* 2.8* 3.0*   ALBUMIN 2.4* 2.4* 2.6* 2.8*   INR 1.41* 1.50* 1.52* 1.72*     Pancreatic Enzymes  No lab results found in last 7 days.  Coagulation Profile  Recent Labs   Lab 02/10/20  0511  02/09/20  0724 02/08/20  0356 02/07/20  0439   INR 1.41* 1.50* 1.52* 1.72*          A/P: Ishan Arroyo is a 59 year old male POD#5 s/p open right hepatectomy due to rectal cancer with liver metastases. Post op course complicated by tachycardia, hypotension, and afib with RVR. Now in sinus rhythm and rate controlled with metoprolol. Pt is hemodynamically stable and is meeting post-op goals.   - Transfer to telemetry  - Afib with RVR: trops negative 2/5 and 2/6; CT PE negative 2/6; echo unremarkable 2/7. Most likely due to catecholamine response post-op.                - Metoprolol, 100 mg BID   - Cardiology following  - Diet: Regular diet  - Pain:    - Discontinue dPCA   - Start PO oxycodone, 5-10 mg PRN   - Start IV dilaudid prn for breakthrough pain   - FEN:    - Electrolyte replacement protocol   - Strict I/Os  - Ppx: ambulate, Lovenox (teaching needed), PT/OT, IS    Myra Prather, MS3      Above reviewed and appropriated changes made. Seen with Dr. Ag Hermosillo PA-C

## 2020-02-11 ENCOUNTER — APPOINTMENT (OUTPATIENT)
Dept: PHYSICAL THERAPY | Facility: CLINIC | Age: 60
DRG: 406 | End: 2020-02-11
Attending: SURGERY
Payer: COMMERCIAL

## 2020-02-11 VITALS
OXYGEN SATURATION: 97 % | HEART RATE: 72 BPM | WEIGHT: 249 LBS | DIASTOLIC BLOOD PRESSURE: 79 MMHG | SYSTOLIC BLOOD PRESSURE: 135 MMHG | RESPIRATION RATE: 16 BRPM | HEIGHT: 73 IN | TEMPERATURE: 98 F | BODY MASS INDEX: 33 KG/M2

## 2020-02-11 LAB
ALBUMIN SERPL-MCNC: 2.4 G/DL (ref 3.4–5)
ALP SERPL-CCNC: 84 U/L (ref 40–150)
ALT SERPL W P-5'-P-CCNC: 92 U/L (ref 0–70)
ANION GAP SERPL CALCULATED.3IONS-SCNC: 6 MMOL/L (ref 3–14)
AST SERPL W P-5'-P-CCNC: 42 U/L (ref 0–45)
BILIRUB SERPL-MCNC: 1.7 MG/DL (ref 0.2–1.3)
BUN SERPL-MCNC: 7 MG/DL (ref 7–30)
CALCIUM SERPL-MCNC: 7.8 MG/DL (ref 8.5–10.1)
CHLORIDE SERPL-SCNC: 106 MMOL/L (ref 94–109)
CO2 SERPL-SCNC: 26 MMOL/L (ref 20–32)
CREAT SERPL-MCNC: 0.54 MG/DL (ref 0.66–1.25)
ERYTHROCYTE [DISTWIDTH] IN BLOOD BY AUTOMATED COUNT: 16.8 % (ref 10–15)
GFR SERPL CREATININE-BSD FRML MDRD: >90 ML/MIN/{1.73_M2}
GLUCOSE BLDC GLUCOMTR-MCNC: 104 MG/DL (ref 70–99)
GLUCOSE BLDC GLUCOMTR-MCNC: 109 MG/DL (ref 70–99)
GLUCOSE BLDC GLUCOMTR-MCNC: 93 MG/DL (ref 70–99)
GLUCOSE SERPL-MCNC: 93 MG/DL (ref 70–99)
HCT VFR BLD AUTO: 33.7 % (ref 40–53)
HGB BLD-MCNC: 11.1 G/DL (ref 13.3–17.7)
INR PPP: 1.41 (ref 0.86–1.14)
MAGNESIUM SERPL-MCNC: 1.9 MG/DL (ref 1.6–2.3)
MCH RBC QN AUTO: 29.5 PG (ref 26.5–33)
MCHC RBC AUTO-ENTMCNC: 32.9 G/DL (ref 31.5–36.5)
MCV RBC AUTO: 90 FL (ref 78–100)
PHOSPHATE SERPL-MCNC: 3.2 MG/DL (ref 2.5–4.5)
PLATELET # BLD AUTO: 182 10E9/L (ref 150–450)
POTASSIUM SERPL-SCNC: 3.2 MMOL/L (ref 3.4–5.3)
PROT SERPL-MCNC: 5.6 G/DL (ref 6.8–8.8)
RBC # BLD AUTO: 3.76 10E12/L (ref 4.4–5.9)
SODIUM SERPL-SCNC: 139 MMOL/L (ref 133–144)
WBC # BLD AUTO: 9.1 10E9/L (ref 4–11)

## 2020-02-11 PROCEDURE — 85027 COMPLETE CBC AUTOMATED: CPT | Performed by: SURGERY

## 2020-02-11 PROCEDURE — 85610 PROTHROMBIN TIME: CPT | Performed by: SURGERY

## 2020-02-11 PROCEDURE — 00000146 ZZHCL STATISTIC GLUCOSE BY METER IP

## 2020-02-11 PROCEDURE — 83735 ASSAY OF MAGNESIUM: CPT | Performed by: SURGERY

## 2020-02-11 PROCEDURE — 80053 COMPREHEN METABOLIC PANEL: CPT | Performed by: SURGERY

## 2020-02-11 PROCEDURE — 25000132 ZZH RX MED GY IP 250 OP 250 PS 637: Performed by: STUDENT IN AN ORGANIZED HEALTH CARE EDUCATION/TRAINING PROGRAM

## 2020-02-11 PROCEDURE — 97530 THERAPEUTIC ACTIVITIES: CPT | Mod: GP

## 2020-02-11 PROCEDURE — 36415 COLL VENOUS BLD VENIPUNCTURE: CPT | Performed by: SURGERY

## 2020-02-11 PROCEDURE — 99024 POSTOP FOLLOW-UP VISIT: CPT | Performed by: PHYSICIAN ASSISTANT

## 2020-02-11 PROCEDURE — 84100 ASSAY OF PHOSPHORUS: CPT | Performed by: SURGERY

## 2020-02-11 RX ORDER — METOPROLOL TARTRATE 100 MG
100 TABLET ORAL 2 TIMES DAILY
Qty: 30 TABLET | Refills: 0 | Status: SHIPPED | OUTPATIENT
Start: 2020-02-11

## 2020-02-11 RX ADMIN — POTASSIUM CHLORIDE 40 MEQ: 750 TABLET, EXTENDED RELEASE ORAL at 08:02

## 2020-02-11 RX ADMIN — METOPROLOL TARTRATE 100 MG: 50 TABLET, FILM COATED ORAL at 08:03

## 2020-02-11 ASSESSMENT — ACTIVITIES OF DAILY LIVING (ADL)
ADLS_ACUITY_SCORE: 11

## 2020-02-11 NOTE — PLAN OF CARE
Time: 2000-2330     Reason for admission/Dx:   Metastatic colon cancer to liver (H) [C18.9, C78.7]     /81. Pulse 78. Temp 98.3 Oral. Resp 22. SpO2 98% RA.     Shift changes: Mild HTN, otherwise VSS, on RA. AOx4, up ad sheila. Transferred from .  Report received by ROSEMARY Huston. Came for R-hepatectomy; tolerated well, endorsed Afib w/ RVR however, in PACU; requiring amio gtt, since resolved. On Tele: NSR, with PVCs and PACs. Denies pain.  Off PCA pump. Minimal PRN intervention today, per MAR review. Incisional site well-approximated and healing well, without signs of infection.  SUMMER drain noted for 75mL orange serosanguineous fluid; dressing CDI. Day 1 of 28 of Lovenox subQ intervention; educated pt on VTE prophylaxis, signs/symptoms, administration, hand-outs provided for reference. Pt notes daughter is RN and be available to be a resource/ help with administration. Pt notes BM, passing flatus; diarrhea. No N/V. Tolerating regular diet.     Plan: Discharge to home 2/11.

## 2020-02-11 NOTE — PROGRESS NOTES
Surgical Oncology Progress Note    S:  No acute events overnight. Pt seen at bedside resting comfortably. Pain well controlled; no oxycodone used yesterday or overnight. No nausea/emesis. Passing flatus and having loose BMs, improving and now more formed. Voiding without issues. Tolerating regular diet with improvement of appetite. Denies SOB, angina, fever. Ambulating well.      O:  Temp:  [98.2  F (36.8  C)-98.9  F (37.2  C)] 98.2  F (36.8  C)  Pulse:  [72-77] 72  Heart Rate:  [69-78] 72  Resp:  [15-22] 18  BP: (107-145)/(78-97) 140/84  SpO2:  [97 %-100 %] 97 %    I/O last 3 completed shifts:  In: 1905 [P.O.:1560; I.V.:345]  Out: 1783 [Urine:995; Drains:788]    Gen: A&Ox3, NAD  Resp: non-labored breathing on room air.  Abd: Soft, Non-distended, Non-tender, No rebound or guarding. SUMMER drain with serous output.   Ext: warm and well perfused, pulses intact, ROM intact  Incisions: c/d/i    Labs:  Complete Blood Count   Recent Labs   Lab 02/11/20  0600 02/10/20  0511 02/09/20 0724 02/08/20  0356   WBC 9.1 8.0 9.1 13.3*   HGB 11.1* 10.6* 11.0* 10.9*    166 158 155     Basic Metabolic Panel  Recent Labs   Lab 02/10/20  0511 02/09/20  1740 02/09/20  0724 02/08/20  0356 02/07/20  0439     --  137 134 136   POTASSIUM 3.4 3.1* 3.1* 3.5 4.2   CHLORIDE 107  --  106 104 106   CO2 26  --  28 28 26   BUN 5*  --  5* 6* 13   CR 0.49*  --  0.50* 0.51* 0.72   *  --  119* 134* 229*     Liver Function Tests  Recent Labs   Lab 02/10/20  0511 02/09/20  0724 02/08/20  0356 02/07/20  0439   AST 57* 86* 171* 301*   * 155* 229* 325*   ALKPHOS 79 73 69 67   BILITOTAL 2.1* 2.3* 2.8* 3.0*   ALBUMIN 2.4* 2.4* 2.6* 2.8*   INR 1.41* 1.50* 1.52* 1.72*     Pancreatic Enzymes  No lab results found in last 7 days.  Coagulation Profile  Recent Labs   Lab 02/10/20  0511 02/09/20  0724 02/08/20  0356 02/07/20  0439   INR 1.41* 1.50* 1.52* 1.72*          A/P: Ishan Arroyo is a 59 year old male POD#6 s/p open right hepatectomy  due to rectal cancer with liver metastases. Post op course complicated by tachycardia, hypotension, and afib with RVR. Now in sinus rhythm and rate controlled with metoprolol. Pt is hemodynamically stable and is meeting post-op goals.   - Discharge today  - Afib with RVR: trops negative 2/5 and 2/6; CT PE negative 2/6; echo unremarkable 2/7. Most likely due to catecholamine response post-op.                - Metoprolol, 100 mg BID               - Follow up with cardiology outpatient  - Diet: Regular diet  - Pain:                - PO oxycodone, 5-10 mg PRN               - IV dilaudid prn for breakthrough pain   - FEN:                - Electrolyte replacement protocol               - Strict I/Os  - Drains:   - Needs drain care teaching   - Ppx: ambulate, Lovenox (teaching needed; started 2/5), PT/OT, IS     Myra Prather, MS3    Agree with above.     Ximena Hermosillo PA-C     Seen with Chief Resident who will discuss with Staff.

## 2020-02-11 NOTE — PROGRESS NOTES
Alin Arroyo left 5A with his daughter and son-in-law. He received discharge education for lovenox injections (by nursing staff) and the PEG drainage bag (by PLC). Nursing reviewed the AVS and all questions were answered. Pt left with all belongings and PIV was removed. Alin picked up his home meds at the discharge pharmacy and was discharged home via a ride from his daughter.

## 2020-02-11 NOTE — PLAN OF CARE
1184-3996: Pt A&O, pleasant, slightly hypertensive this AM, ambulating independently to bathroom.  Tele NSR with occasional PVCs.  Pt has no pain.  Abd incision well approximated.  SUMMER drain in place, 180 mL of serosang output this shift.  PIV saline locked.  BG stable at 0200.  B&B WNL per pt.  Possible discharge 2/11.  Will continue to monitor.

## 2020-02-11 NOTE — PROGRESS NOTES
Brief Care Coordination Note    Patient is discharging to home w/lovenox, teaching has been completed. Per chart review, patient had old SUMMER site that is leaking and teaching has been ordered. It is unclear what teaching would provide at this item, as the frankie is pouched and will likely stop draining within a few days per the primary team. Patient's daughter feels confident with assisting with old drain site care, extra pouches have been given to the patient if needed. Patient's daughter notes that they will purchase more on [employer] if needed, declined a DME script for supplies at this time.     Confirmed with patient learning nurse, teaching is not needed prior to discharge.     Jennifer Sweeney, RNCC, BSN    [facility]    Medicine Group  [address] [place] [id number]    [email or web address]  [email or web address]    Office: [phone] Pager: [phone]  To contact weekend RNCC, dial [phone] and enter pager number [phone] at prompt. This pager can not be contacted by text page or outside line.

## 2020-02-11 NOTE — PLAN OF CARE
"/79 (BP Location: Right arm)   Pulse 72   Temp 98.4  F (36.9  C) (Oral)   Resp 18   Ht 1.854 m (6' 1\")   Wt 112.9 kg (249 lb)   SpO2 97%   BMI 32.85 kg/m      Time: 6415-9167  Admit Dx: Metastatic colon cancer to liver    Neuro: A/Ox3. Calm, cooperative, and calls appropriately.  Cardiac: HTN.  Respiratory: Clear throughout. O2 sat 97% on RA.  GI/: Voids spontaneously. LBM: 2/10/2020.  Skin: Declined full skin assessment. Visible skin was CDI.  Activity: Independent.  Pain: Denies.  Diet: Regular diet. Tolerating well with good appetite.      Shift Changes: Replaced potassium. PLC gave discharge edu for abd baggage drain.  Plan of Care: Will discharge later this afternoon.    "

## 2020-02-11 NOTE — PLAN OF CARE
Discharge Planner PT   Patient plan for discharge: Home to his daughter's house initially   Current status: Patient IND with flat bed mobility x2 during session moving through log roll. Patient transferred sit<>stand IND with no AD. Patient ambulated ~500'+ with no AD, IND, showing good balance and stability. Patient endorses mild fatigue with ambulation and prolonged activity. Patient negotiated 2x3 stairs with railing on L with good tolerance and safety. Patient and daughter educated to continue with regular activity and ambulation at home to help improve fatigue.    At this time, all IP PT needs met. PT will complete orders. Appropriate to discharge to  home when medically appropriate.  Barriers to return to prior living situation: Medical, no mobility concerns.  Recommendations for discharge: Home with daughter, progress to his home when able.  Rationale for recommendations: No further PT needs after discharge.    Physical Therapy Discharge Summary    Reason for therapy discharge:    Discharged to home.  All goals and outcomes met, no further needs identified.    Progress towards therapy goal(s). See goals on Care Plan in [facility] electronic health record for goal details.  Goals met    Therapy recommendation(s):    No further therapy is recommended.           Entered by: Syeda Cobb 02/11/2020 1:07 PM

## 2020-02-12 ENCOUNTER — TELEPHONE (OUTPATIENT)
Dept: SURGERY | Facility: CLINIC | Age: 60
End: 2020-02-12

## 2020-02-12 ENCOUNTER — PATIENT OUTREACH (OUTPATIENT)
Dept: SURGERY | Facility: CLINIC | Age: 60
End: 2020-02-12

## 2020-02-12 LAB — COPATH REPORT: NORMAL

## 2020-02-12 NOTE — TELEPHONE ENCOUNTER
Post Op Discharge Call    Surgery: Open Right hepatectomy, cholecystectomy, lysis of adhesions.     Surgery date: 2/5/2020    Discharge Date:  2/11/20    Immediate Concerns: None at this time.     Pain: Controlled well, no concerns regarding pain management. He reports he is not really taking pain medication much at this time.    Incision:  No concerns, healing well, no redness, drainage or edema reported.      Diet: Regular, tolerating well. Denies nausea, vomiting.     Bowels:  Passing gas, no issues with bowel movements. Denies constipation and cramping.     Activity: No difficulty with ADLs reported. Patient up independently at home.     Post op/follow up plans: Post op appointment scheduled, confirmed date and time with patient.     Patient has our direct number for any questions or concerns that may arise.      Lena aFrfan RN, BSN  Care Coordinator - Dr. Luong  [phone]

## 2020-02-13 ENCOUNTER — TELEPHONE (OUTPATIENT)
Dept: SURGERY | Facility: CLINIC | Age: 60
End: 2020-02-13

## 2020-02-13 LAB
BACTERIA SPEC CULT: NO GROWTH
BACTERIA SPEC CULT: NO GROWTH
SPECIMEN SOURCE: NORMAL
SPECIMEN SOURCE: NORMAL

## 2020-02-14 NOTE — TELEPHONE ENCOUNTER
I called and let the patient know about his surgical pathology.  I discussed that all margins were negative and that the remaining tumors within his liver had no viable tumor cells.  He informed me that he was doing very well from his surgery.  He will come and see me in clinic in the next 1-2 weeks.  All questions answered.  The patient was in agreement with and understanding of the plan.

## 2020-02-17 ENCOUNTER — DOCUMENTATION ONLY (OUTPATIENT)
Dept: ONCOLOGY | Facility: CLINIC | Age: 60
End: 2020-02-17

## 2020-02-17 NOTE — PROGRESS NOTES
FMLA for family forms received via triage fax from [facility].  Forms to be completed and put in folder for provider to approve.  Leave ID: [id number]     Leatha Holcomb CMA ([facility])

## 2020-02-17 NOTE — PROGRESS NOTES
FMLA forms completed, checked for accuracy, and put in provider folder for signature.     Leatha Holcomb CMA ([facility])

## 2020-02-21 NOTE — PROGRESS NOTES
Daughters FMLA paperwork completed, signed and faxed to [employer] @ [phone]. A copy was made, filed in family form accordion and original mailed to patient at home address.    Verified successful transmission in Right Fax.    MILTON MITCHELL, CMA

## 2020-02-28 ENCOUNTER — OFFICE VISIT (OUTPATIENT)
Dept: SURGERY | Facility: CLINIC | Age: 60
End: 2020-02-28
Attending: SURGERY
Payer: COMMERCIAL

## 2020-02-28 VITALS
OXYGEN SATURATION: 100 % | SYSTOLIC BLOOD PRESSURE: 134 MMHG | HEIGHT: 73 IN | HEART RATE: 77 BPM | TEMPERATURE: 97.8 F | RESPIRATION RATE: 16 BRPM | WEIGHT: 228.5 LBS | DIASTOLIC BLOOD PRESSURE: 88 MMHG | BODY MASS INDEX: 30.28 KG/M2

## 2020-02-28 DIAGNOSIS — C19 METASTATIC COLORECTAL CANCER: Primary | ICD-10-CM

## 2020-02-28 PROCEDURE — G0463 HOSPITAL OUTPT CLINIC VISIT: HCPCS | Mod: ZF

## 2020-02-28 ASSESSMENT — PAIN SCALES - GENERAL: PAINLEVEL: NO PAIN (0)

## 2020-02-28 ASSESSMENT — MIFFLIN-ST. JEOR: SCORE: 1910.35

## 2020-02-28 ASSESSMENT — ENCOUNTER SYMPTOMS
NAUSEA: 0
BLOATING: 0
BOWEL INCONTINENCE: 0
VOMITING: 0
CONSTIPATION: 1
JAUNDICE: 0
RECTAL PAIN: 0
ABDOMINAL PAIN: 0
HEARTBURN: 0
BLOOD IN STOOL: 0
DIARRHEA: 0

## 2020-02-28 NOTE — PROGRESS NOTES
Reason for Visit: Colorectal Cancer Liver Metastases     Pertinent Oncologic History: 59 M w/ rectosigmoid cancer s/p resection with synchronous liver metastases now s/p 13 cycles of chemotherapy including oxaliplatin with stable hepatic disease.  Two lesions, 1 in the right hepatic dome, segment 8 abutting the right hepatic vein and encroaching close to the vena cava. The second is deep in segment 7.  Liver cysts stable.  Last dose of chemotherapy 12/30/2019.  I originally saw the patient in clinic 9/13/2019 and liver volumetry demonstrated a marginal sFLR, as such, I recommended the patient undergo right PVE to generate a left FLR > 30%.  PVE was performed with microspheres and coils 10/10/2019.  However, he did not have the desired growth from this procedure and still had some patent portal flow.  As such, he was taken for attempted laparoscopic ablation, but the lesions were difficult to define in terms of borders on ultrasound.  As such, no ablation was carried out.  Liver biopsy showed microvascular injury, likely from chemotherapy.  He subsequently underwent re-embolization on 12/3/2019 and received further chemotherapy, last dose on 12/30/2019.  He subsequently underwent right hepatectomy 2/5/2020.  Final pathology showed no residual cancer and negative margins for an R0 resection.     Pertinent Work-Up/Findings: Final pathology with no residual live tumor cells and negative resection margins.     Pertinent Exam: Abdomen soft, non-tender, and non-distended.  No jaundice or scleral icterus.  Well healed lower midline laparotomy.     Assessment/Counseling/Plan: 59 M w/ colorectal cancer liver metastases in segments 8 and 7 with good response to 13 cycles of systemic therapy with platinum based chemotherapy.  He underwent right hepatectomy 2/5/2020 with resultant R0 resection.  No remaining viable tumor cells in the resection specimen.  We had a discussion that the surgery was as successful as we can ask for.   He still has significant risk for recurrence within the liver; however, he has had a significant amount of chemotherapy already with excellent response and now R0 resection.  I believe he is a candidate to go on observation without further chemotherapy at this time, although I will leave this up to his medical oncologist.  No need to RTC unless questions or issues arise.  All questions answered and the patient was in agreement with and understanding of the plan.       A total of 10 minutes of face to face time was spent on this case, of which, more than half was spent in counseling and coordination of care.

## 2020-02-28 NOTE — NURSING NOTE
"Oncology Rooming Note    February 28, 2020 9:12 AM   Ishan Arroyo is a 59 year old male who presents for:    Chief Complaint   Patient presents with     Oncology Clinic Visit     RETURN VISIT; COLON CANCER; VITALS TAKEN      Initial Vitals: /88   Pulse 77   Temp 97.8  F (36.6  C) (Oral)   Resp 16   Ht 1.854 m (6' 1\")   Wt 103.6 kg (228 lb 8 oz)   SpO2 100%   BMI 30.15 kg/m   Estimated body mass index is 30.15 kg/m  as calculated from the following:    Height as of this encounter: 1.854 m (6' 1\").    Weight as of this encounter: 103.6 kg (228 lb 8 oz). Body surface area is 2.31 meters squared.  No Pain (0) Comment: Data Unavailable   No LMP for male patient.  Allergies reviewed: Yes  Medications reviewed: Yes    Medications: Medication refills not needed today.  Pharmacy name entered into EPIC: [facility] - [place], [place] - [address] [place] [address]    Clinical concerns: No new concerns today      Partner Violence Screening Completed: Yes   Oncology Distress Screening Completed: Yes     Emelyn Valverde              "

## 2020-02-28 NOTE — LETTER
2/28/2020       RE: Ishan Arroyo  [address]  [place] [address]     Dear Colleague,    Thank you for referring your patient, Ishan Arroyo, to the [facility]. Please see a copy of my visit note below.    Reason for Visit: Colorectal Cancer Liver Metastases     Pertinent Oncologic History: 59 M w/ rectosigmoid cancer s/p resection with synchronous liver metastases now s/p 13 cycles of chemotherapy including oxaliplatin with stable hepatic disease.  Two lesions, 1 in the right hepatic dome, segment 8 abutting the right hepatic vein and encroaching close to the vena cava. The second is deep in segment 7.  Liver cysts stable.  Last dose of chemotherapy 12/30/2019.  I originally saw the patient in clinic 9/13/2019 and liver volumetry demonstrated a marginal sFLR, as such, I recommended the patient undergo right PVE to generate a left FLR > 30%.  PVE was performed with microspheres and coils 10/10/2019.  However, he did not have the desired growth from this procedure and still had some patent portal flow.  As such, he was taken for attempted laparoscopic ablation, but the lesions were difficult to define in terms of borders on ultrasound.  As such, no ablation was carried out.  Liver biopsy showed microvascular injury, likely from chemotherapy.  He subsequently underwent re-embolization on 12/3/2019 and received further chemotherapy, last dose on 12/30/2019.   He subsequently underwent right hepatectomy 2/5/2020.  Final pathology showed no residual cancer and negative margins for an R0 resection.     Pertinent Work-Up/Findings: Final pathology with no residual live tumor cells and negative resection margins.     Pertinent Exam: Abdomen soft, non-tender, and non-distended.  No jaundice or scleral icterus.  Well healed lower midline laparotomy.     Assessment/Counseling/Plan: 59 M w/ colorectal cancer liver metastases in segments 8 and 7 with good response to 13 cycles of systemic  therapy with platinum based chemotherapy.  He underwent right hepatectomy 2/5/2020 with resultant R0 resection.  No remaining viable tumor cells in the resection specimen.  We had a discussion that the surgery was as successful as we can ask for.  He still has significant risk for recurrence within the liver; however, he has had a significant amount of chemotherapy already with excellent response and now R0 resection.  I believe he is a candidate to go on observation without further chemotherapy at this time, although I will leave this up to his medical oncologist.  No need to RTC unless questions or issues arise.  All questions answered and the patient was in agreement with and understanding of the plan.       A total of 10 minutes of face to face time was spent on this case, of which, more than half was spent in counseling and coordination of care.    Again, thank you for allowing me to participate in the care of your patient.      Sincerely,    Lele Luong MD

## 2020-03-11 ENCOUNTER — HEALTH MAINTENANCE LETTER (OUTPATIENT)
Age: 60
End: 2020-03-11

## 2020-03-27 ENCOUNTER — TRANSFERRED RECORDS (OUTPATIENT)
Dept: HEALTH INFORMATION MANAGEMENT | Facility: CLINIC | Age: 60
End: 2020-03-27

## 2021-01-03 ENCOUNTER — HEALTH MAINTENANCE LETTER (OUTPATIENT)
Age: 61
End: 2021-01-03

## 2021-04-25 ENCOUNTER — HEALTH MAINTENANCE LETTER (OUTPATIENT)
Age: 61
End: 2021-04-25

## 2021-10-10 ENCOUNTER — HEALTH MAINTENANCE LETTER (OUTPATIENT)
Age: 61
End: 2021-10-10

## 2022-05-21 ENCOUNTER — HEALTH MAINTENANCE LETTER (OUTPATIENT)
Age: 62
End: 2022-05-21

## 2022-09-18 ENCOUNTER — HEALTH MAINTENANCE LETTER (OUTPATIENT)
Age: 62
End: 2022-09-18

## 2023-01-01 NOTE — TELEPHONE ENCOUNTER
"Ib sent to Lena Farfan:    Good morning, Lena    I just had a few quick questions about records needed for the patients upcoming appointment w/ Dr. Luong. The Pre-Visit notes, \"Has patient been seen for any external appt for this diagnosis (enter clinic/location) that records should be gathered from? No. We have them.\"    My questions: There is a telephone encounter (by Goran Lopez MD on 01/15/2019) that directly references the initial CT scan results - should we locate & request that image? Additionally, the patient had pathology done 1/22/19, should we request those slides? I only ask because the appointment looks like it was scheduled per you, and wanted to double check. Please advise.    Thank you,    Cristino Carrero  " (2) Male

## 2023-06-04 ENCOUNTER — HEALTH MAINTENANCE LETTER (OUTPATIENT)
Age: 63
End: 2023-06-04

## 2025-06-13 NOTE — ANESTHESIA PREPROCEDURE EVALUATION
Anesthesia Pre-Procedure Evaluation    Patient: Ishan Arroyo   MRN:     [id number] Gender:   male   Age:    59 year old :      1960        Preoperative Diagnosis: Metastatic Colon Cancer To Liver   Procedure(s):  Exploratory Laparotomy,Open Liver Resection,Possible Ablation  Cholecystectomy With Intraoperative Ultrasound     No past medical history on file.   Past Surgical History:   Procedure Laterality Date     IR VISCERAL EMBOLIZATION  10/10/2019          Anesthesia Evaluation     . Pt has had prior anesthetic. Type: General and MAC    History of anesthetic complications   - motion sickness        ROS/MED HX    ENT/Pulmonary:     (+)MATHEUS risk factors hypertension, , . .    Neurologic:     (+)neuropathy - chemo related,     Cardiovascular:     (+) Dyslipidemia, hypertension----. : . . . :. . Previous cardiac testing Echodate:results:date: results:ECG reviewed date: results: date: results:          METS/Exercise Tolerance:  4 - Raking leaves, gardening   Hematologic:     (+) Anemia, History of Transfusion -      Musculoskeletal:  - neg musculoskeletal ROS       GI/Hepatic:     (+) liver disease,       Renal/Genitourinary:     (+) Nephrolithiasis ,       Endo:         Psychiatric:  - neg psychiatric ROS       Infectious Disease:  - neg infectious disease ROS       Malignancy:   (+) Malignancy History of GI and Other  GI CA status post Surgery and Chemo, Other CA embolization status post         Other:    (+) No chance of pregnancy no H/O Chronic Pain,no other significant disability                        PHYSICAL EXAM:   Mental Status/Neuro: A/A/O   Airway: Facies: Feasible  Mallampati: II  Mouth/Opening: Full  TM distance: > 6 cm  Neck ROM: Limited   Respiratory: Auscultation: CTAB     Resp. Rate: Normal     Resp. Effort: Normal      CV: Rhythm: Regular  Heart: Normal Sounds  Edema: None   Comments:      Dental: Normal Dentition                LABS:  CBC:   Lab Results   Component Value Date    WBC 6.3  "10/10/2019    WBC 6.3 10/08/2019    HGB 13.6 10/10/2019    HGB 14.3 10/08/2019    HCT 42.0 10/10/2019    HCT 42.1 10/08/2019     10/10/2019     10/08/2019     BMP:   Lab Results   Component Value Date     10/10/2019     10/08/2019    POTASSIUM 3.8 10/10/2019    POTASSIUM 4.1 10/08/2019    CHLORIDE 106 10/10/2019    CHLORIDE 107 10/08/2019    CO2 27 10/10/2019    CO2 27 10/08/2019    BUN 13 10/10/2019    BUN 10 10/08/2019    CR 0.59 (L) 10/10/2019    CR 0.59 (L) 10/08/2019    GLC 84 10/10/2019    GLC 91 10/08/2019     COAGS:   Lab Results   Component Value Date    PTT 35 10/10/2019    INR 1.14 10/10/2019     POC:   Lab Results   Component Value Date    BGM 83 10/10/2019     OTHER:   Lab Results   Component Value Date    IRENE 8.6 10/10/2019    ALBUMIN 3.6 10/10/2019    PROTTOTAL 7.3 10/10/2019    ALT 26 10/10/2019    AST 28 10/10/2019    ALKPHOS 91 10/10/2019    BILITOTAL 0.8 10/10/2019        Preop Vitals    BP Readings from Last 3 Encounters:   11/08/19 (!) 147/83   11/08/19 (!) 148/89   10/10/19 101/65    Pulse Readings from Last 3 Encounters:   11/08/19 75   11/08/19 75   10/10/19 66      Resp Readings from Last 3 Encounters:   11/08/19 19   11/08/19 16   10/10/19 18    SpO2 Readings from Last 3 Encounters:   11/08/19 97%   11/08/19 97%   10/10/19 92%      Temp Readings from Last 1 Encounters:   11/08/19 98.6  F (37  C) (Oral)    Ht Readings from Last 1 Encounters:   11/08/19 1.88 m (6' 2\")      Wt Readings from Last 1 Encounters:   11/08/19 106.1 kg (234 lb)    Estimated body mass index is 30.04 kg/m  as calculated from the following:    Height as of this encounter: 1.88 m (6' 2\").    Weight as of this encounter: 106.1 kg (234 lb).     LDA:        LESA FV AN PLAN NO PONV RULE       PAC Discussion and Assessment    ASA Classification: 3  Case is suitable for:   Anesthetic techniques and relevant risks discussed: GA  Invasive monitoring and risk discussed: Yes  Types:   Possibility and Risk " of blood transfusion discussed: Yes  NPO instructions given:   Additional anesthetic preparation and risks discussed:   Needs early admission to pre-op area:   Other:     PAC Resident/NP Anesthesia Assessment:  Ishan Arroyo is a 59 year old for exploratory lap, open liver resection, possible ablation, cholycystectomy with intraoperative US, with Dr. Lele Luong and Dr. Jaime Gallardo, in continued treatment of liver metastatic ds. PAC referral for risk assessment and optimization for anesthesia with co morbid conditions of colorectal cancer S/P colectomy, HTN, HLD, obesity, former tobacco chewer, anemia, GERD.   Pre-operative considerations include:  1.) CV: Status independent/exercise tolerance > 4 METS. Risks: HTN (metoprolol); HLD (Zocor).  EKG: read as unremarkable by PCP 9/20/19.  ECHO: 1/14/19: EF > 55%. No significant valvar ds.   RCRI = 1 reflecting 0.9% risk of major adverse cardiac event.  2.) Pulmonary: Never smoked. Former tobacco chewer. No pulmonary dx, sx or meds.   Obesity BMI 30. MATHEUS risk of male > age 50 with HTN = 3/8 = intermediate risk / no formal testing  3.) Heme: Hx anemia-last HGB 9/17/19 was 13. No bleeding or clotting dysfunction.   type and screen  VTE risk is elevated in this patient due to diagnosis of cancer  4.) GI: GERD on PPI which he will take DOS. 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 On xeloda.   Procedure as planned  PONV score = 1 (2 or > antiemetic prophylaxis recommended).      Anesthesia: GA in past with no complications. No PONV,  Used scopolamine patch in past.  Pt discussed with Dr. Palencia           Reviewed and Signed by PAC Mid-Level Provider/Resident  Mid-Level Provider/Resident: Tila Wolfe PA-C  Date: 11/8/19  Time: 12:21 PM    Attending Anesthesiologist Anesthesia Assessment:  Agree with abone  Last GETA  1/22/19- Colon resection;  champion 2 unsuccessful   Dr. Scott White at [place] Grade III view,  cmac  No.2 blade-easy intubation, 8.0 ett  grade 1 view   Plan GETA, consider  Using cmac  JVB    Reviewed and Signed by PAC Anesthesiologist  Anesthesiologist: Thierry  Date:   Time:   Pass/Fail:   Disposition:     PAC Pharmacist Assessment:        Pharmacist:   Date:   Time:    KAREN Tao   MD White

## (undated) DEVICE — SU SILK 3-0 TIE 12X30" A304H

## (undated) DEVICE — CLIP APPLIER 11" MED LIGACLIP MCM30

## (undated) DEVICE — LINEN TOWEL PACK X6 WHITE 5487

## (undated) DEVICE — PREP CHLORAPREP 26ML TINTED ORANGE  260815

## (undated) DEVICE — DRAPE ISOLATION BAG 1003

## (undated) DEVICE — CONNECTOR STOPCOCK 3 WAY MALE LL MX4311L

## (undated) DEVICE — SU MONOCRYL 4-0 PS-2 27" UND Y426H

## (undated) DEVICE — SPONGE KITTNER 30-101

## (undated) DEVICE — CATH CHOLANGIOGRAM 7.5FR TAUT PLASTIC TIP 20018-010

## (undated) DEVICE — CLIP HORIZON MED BLUE 002200

## (undated) DEVICE — DRAPE IOBAN INCISE 23X17" 6650EZ

## (undated) DEVICE — TOURNIQUET VASCULAR KIT ARGYLE 8888-585000

## (undated) DEVICE — SU ETHILON 3-0 PS-1 18" 1663H

## (undated) DEVICE — ESU CLEANER TIP 31142717

## (undated) DEVICE — ESU HARMONIC ACE LAPAROSCOPIC SHEARS 5MMX36CM CVD HAR36

## (undated) DEVICE — CUSA 36KHZ WRENCH

## (undated) DEVICE — LINEN TOWEL PACK X30 5481

## (undated) DEVICE — SU PROLENE 4-0 RB-1DA 36" 8557H

## (undated) DEVICE — DRAIN JACKSON PRATT RESERVOIR 100ML SU130-1305

## (undated) DEVICE — SUTURE BOOTS 051003PBX

## (undated) DEVICE — STPL POWERED ECHELON VASC 35MM PVE35A

## (undated) DEVICE — DEVICE SUTURE GRASPER TROCAR CLOSURE 14GA PMITCSG

## (undated) DEVICE — GLOVE PROTEXIS MICRO 7.0  2D73PM70

## (undated) DEVICE — SU PROLENE 5-0 RB-1DA 36"  8556H

## (undated) DEVICE — CONNECTOR STOPCOCK 3 WAY MALE LL HI-FLO MX9311L

## (undated) DEVICE — SU VICRYL 3-0 SH 27" UND J416H

## (undated) DEVICE — SU MONOCRYL 4-0 PS-2 18" UND Y496G

## (undated) DEVICE — ESU ENDO SILS HOOK 5MM ARTICULATING MONO SILSHOOK36

## (undated) DEVICE — SU PROLENE 4-0 SHDA 36" 8521H

## (undated) DEVICE — DRAIN JACKSON PRATT ROUND W/TROCAR 19FR JP-HUR195

## (undated) DEVICE — Device

## (undated) DEVICE — BLADE CLIPPER SGL USE 9680

## (undated) DEVICE — SU VICRYL 0 TIE 54" J608H

## (undated) DEVICE — ADH SKIN CLOSURE PREMIERPRO EXOFIN 1.0ML 3470

## (undated) DEVICE — SOL NACL 0.9% INJ 1000ML BAG 2B1324X

## (undated) DEVICE — VESSEL LOOPS BLUE SUPERMAXI 011022PBX

## (undated) DEVICE — CATH TRAY FOLEY SURESTEP 16FR W/URNE MTR STLK LATEX A303316A

## (undated) DEVICE — DEVICE SUTURE PASSER 14GA WECK EFX EFXSP2

## (undated) DEVICE — DRAPE POUCH INSTRUMENT 1018

## (undated) DEVICE — WIPES FOLEY CARE SURESTEP PROVON DFC100

## (undated) DEVICE — SU SILK 2-0 TIE 12X30" A305H

## (undated) DEVICE — SOL NACL 0.9% IRRIG 1000ML BOTTLE 2F7124

## (undated) DEVICE — GLOVE PROTEXIS BLUE W/NEU-THERA 7.5  2D73EB75

## (undated) DEVICE — ESU GROUND PAD THERMOGUARD PLUS ABC PEDS 7-382

## (undated) DEVICE — ESU HARMONIC ACE LAPAROSCOPIC SHEARS 5MMX23CM HAR23

## (undated) DEVICE — LINEN GOWN XLG 5407

## (undated) DEVICE — PROTECTOR ARM ONE-STEP TRENDELENBURG 40418

## (undated) DEVICE — SUCTION MANIFOLD DORNOCH ULTRA CART UL-CL500

## (undated) DEVICE — ENDO TROCAR BLUNT TIP KII BALLOON 12X100MM C0R47

## (undated) DEVICE — ANTIFOG SOLUTION W/FOAM PAD 31142527

## (undated) DEVICE — DRSG PRIMAPORE 02X3" 7133

## (undated) DEVICE — WIPE PREMOIST CLEANSING WASHCLOTHS 7988

## (undated) DEVICE — STPL ENDO RELOAD 45MM VASCULAR X-THIN GRAY EGIA45AV

## (undated) DEVICE — PAD CHUX UNDERPAD 23X24" 7136

## (undated) DEVICE — CLIP APPLIER ENDO 5MM M/L LIGAMAX EL5ML

## (undated) DEVICE — TUBING CUSA MANIFOLD 36KHZ

## (undated) DEVICE — SOL WATER IRRIG 1000ML BOTTLE 2F7114

## (undated) DEVICE — SU SILK 0 TIE 6X30" A306H

## (undated) DEVICE — SYR 30ML SLIP TIP W/O NDL 302833

## (undated) DEVICE — ENDO TROCAR FIRST ENTRY KII FIOS Z-THRD 12X100MM CTF73

## (undated) DEVICE — DRSG TELFA 3X8" 1238

## (undated) DEVICE — DRAPE SHEET MED 44X70" 9355

## (undated) DEVICE — SUCTION IRR STRYKERFLOW II W/TIP 250-070-520

## (undated) DEVICE — SU SILK 1 TIE 6X30" A307H

## (undated) DEVICE — STPL ENDO LINEAR CUT ECHELON GST 45MM COMPACT PCEE45A

## (undated) DEVICE — SU SILK 3-0 SH CR 8X18" C013D

## (undated) DEVICE — VESSEL LOOPS YELLOW MAXI 31145694

## (undated) DEVICE — CATH TRAY FOLEY SURESTEP 16FR W/TMP PRB STLK LATEX A319416AM

## (undated) DEVICE — NDL INSUFFLATION 13GA 120MM C2201

## (undated) DEVICE — CLIP HORIZON SM RED WIDE SLOT 001201

## (undated) DEVICE — SURGICEL HEMOSTAT 4X8" 1952

## (undated) DEVICE — HEMOSTAT ABSORBABLE AGENT ARISTA 3GM POWDER SM0002-USA

## (undated) DEVICE — LINEN TOWEL PACK X5 5464

## (undated) DEVICE — SU SILK 4-0 TIE 12X30" A303H

## (undated) DEVICE — SU PDS II 1 TP-1 48" Z880G

## (undated) DEVICE — COVER CAMERA IN-LIGHT DISP LT-C02

## (undated) DEVICE — LIGHT HANDLE X1 31140133

## (undated) DEVICE — CLIP APPLIER 09 3/8" SM LIGACLIP MCS20

## (undated) DEVICE — TUBING INSUFFLATION W/FILTER CPC TO LUER 620-030-301

## (undated) DEVICE — CUSA 36KHZ TIP STD CRD EXT

## (undated) DEVICE — SU PDS II 5-0 RB-1 27" Z303H

## (undated) DEVICE — ENDO TROCAR FIRST ENTRY KII FIOS Z-THRD 05X100MM CTF03

## (undated) DEVICE — ENDO TROCAR SLEEVE KII Z-THREADED 05X100MM CTS02

## (undated) DEVICE — SU VICRYL 0 UR-6 27" J603H

## (undated) DEVICE — SPONGE LAP 18X18" X8435

## (undated) DEVICE — SUCTION TIP POOLE K770

## (undated) DEVICE — ESU GROUND PAD ADULT W/CORD E7507

## (undated) DEVICE — NDL BIOPSY TEMNO 18GAX20CM ACT18/20

## (undated) DEVICE — CLIP HORIZON LG ORANGE 004200

## (undated) DEVICE — SEALER ENDO MONOPOLAR 3.5X11.43X110.5MM 10D ANG 13-121-1

## (undated) DEVICE — ENDO TROCAR SLEEVE KII Z-THREADED 12X100MM CTS22

## (undated) RX ORDER — LIDOCAINE HYDROCHLORIDE 10 MG/ML
INJECTION, SOLUTION EPIDURAL; INFILTRATION; INTRACAUDAL; PERINEURAL
Status: DISPENSED
Start: 2019-10-10

## (undated) RX ORDER — FENTANYL CITRATE 50 UG/ML
INJECTION, SOLUTION INTRAMUSCULAR; INTRAVENOUS
Status: DISPENSED
Start: 2019-12-03

## (undated) RX ORDER — LIDOCAINE HYDROCHLORIDE 10 MG/ML
INJECTION, SOLUTION EPIDURAL; INFILTRATION; INTRACAUDAL; PERINEURAL
Status: DISPENSED
Start: 2019-12-03

## (undated) RX ORDER — ONDANSETRON 2 MG/ML
INJECTION INTRAMUSCULAR; INTRAVENOUS
Status: DISPENSED
Start: 2019-12-03

## (undated) RX ORDER — FENTANYL CITRATE 50 UG/ML
INJECTION, SOLUTION INTRAMUSCULAR; INTRAVENOUS
Status: DISPENSED
Start: 2019-11-27

## (undated) RX ORDER — SODIUM CHLORIDE, SODIUM LACTATE, POTASSIUM CHLORIDE, CALCIUM CHLORIDE 600; 310; 30; 20 MG/100ML; MG/100ML; MG/100ML; MG/100ML
INJECTION, SOLUTION INTRAVENOUS
Status: DISPENSED
Start: 2020-02-05

## (undated) RX ORDER — FENTANYL CITRATE 50 UG/ML
INJECTION, SOLUTION INTRAMUSCULAR; INTRAVENOUS
Status: DISPENSED
Start: 2020-02-05

## (undated) RX ORDER — FENTANYL CITRATE 50 UG/ML
INJECTION, SOLUTION INTRAMUSCULAR; INTRAVENOUS
Status: DISPENSED
Start: 2019-10-10

## (undated) RX ORDER — PHENYLEPHRINE HCL IN 0.9% NACL 1 MG/10 ML
SYRINGE (ML) INTRAVENOUS
Status: DISPENSED
Start: 2020-02-05

## (undated) RX ORDER — HEPARIN SOD,PORCINE/0.9 % NACL 5K/1000 ML
INTRAVENOUS SOLUTION INTRAVENOUS
Status: DISPENSED
Start: 2019-10-10

## (undated) RX ORDER — CEFAZOLIN SODIUM 2 G/100ML
INJECTION, SOLUTION INTRAVENOUS
Status: DISPENSED
Start: 2020-02-05

## (undated) RX ORDER — DEXAMETHASONE SODIUM PHOSPHATE 4 MG/ML
INJECTION, SOLUTION INTRA-ARTICULAR; INTRALESIONAL; INTRAMUSCULAR; INTRAVENOUS; SOFT TISSUE
Status: DISPENSED
Start: 2019-12-03

## (undated) RX ORDER — LIDOCAINE HYDROCHLORIDE 20 MG/ML
INJECTION, SOLUTION EPIDURAL; INFILTRATION; INTRACAUDAL; PERINEURAL
Status: DISPENSED
Start: 2019-11-27

## (undated) RX ORDER — ALBUMIN, HUMAN INJ 5% 5 %
SOLUTION INTRAVENOUS
Status: DISPENSED
Start: 2020-02-05

## (undated) RX ORDER — SCOLOPAMINE TRANSDERMAL SYSTEM 1 MG/1
PATCH, EXTENDED RELEASE TRANSDERMAL
Status: DISPENSED
Start: 2019-10-10

## (undated) RX ORDER — MAGNESIUM SULFATE HEPTAHYDRATE 40 MG/ML
INJECTION, SOLUTION INTRAVENOUS
Status: DISPENSED
Start: 2020-02-05

## (undated) RX ORDER — HYDROMORPHONE HYDROCHLORIDE 1 MG/ML
INJECTION, SOLUTION INTRAMUSCULAR; INTRAVENOUS; SUBCUTANEOUS
Status: DISPENSED
Start: 2020-02-05

## (undated) RX ORDER — LIDOCAINE HYDROCHLORIDE 20 MG/ML
INJECTION, SOLUTION EPIDURAL; INFILTRATION; INTRACAUDAL; PERINEURAL
Status: DISPENSED
Start: 2020-02-05

## (undated) RX ORDER — LIDOCAINE HYDROCHLORIDE 20 MG/ML
INJECTION, SOLUTION EPIDURAL; INFILTRATION; INTRACAUDAL; PERINEURAL
Status: DISPENSED
Start: 2019-12-03

## (undated) RX ORDER — ESMOLOL HYDROCHLORIDE 10 MG/ML
INJECTION INTRAVENOUS
Status: DISPENSED
Start: 2020-02-05

## (undated) RX ORDER — PROPOFOL 10 MG/ML
INJECTION, EMULSION INTRAVENOUS
Status: DISPENSED
Start: 2019-12-03

## (undated) RX ORDER — CEFAZOLIN SODIUM 1 G/3ML
INJECTION, POWDER, FOR SOLUTION INTRAMUSCULAR; INTRAVENOUS
Status: DISPENSED
Start: 2019-11-27

## (undated) RX ORDER — DEXAMETHASONE SODIUM PHOSPHATE 4 MG/ML
INJECTION, SOLUTION INTRA-ARTICULAR; INTRALESIONAL; INTRAMUSCULAR; INTRAVENOUS; SOFT TISSUE
Status: DISPENSED
Start: 2019-11-27

## (undated) RX ORDER — ONDANSETRON 2 MG/ML
INJECTION INTRAMUSCULAR; INTRAVENOUS
Status: DISPENSED
Start: 2020-02-05

## (undated) RX ORDER — EPHEDRINE SULFATE 50 MG/ML
INJECTION, SOLUTION INTRAMUSCULAR; INTRAVENOUS; SUBCUTANEOUS
Status: DISPENSED
Start: 2019-11-27

## (undated) RX ORDER — OXYCODONE HYDROCHLORIDE 5 MG/1
TABLET ORAL
Status: DISPENSED
Start: 2019-11-27

## (undated) RX ORDER — DEXAMETHASONE SODIUM PHOSPHATE 4 MG/ML
INJECTION, SOLUTION INTRA-ARTICULAR; INTRALESIONAL; INTRAMUSCULAR; INTRAVENOUS; SOFT TISSUE
Status: DISPENSED
Start: 2020-02-05

## (undated) RX ORDER — PROPOFOL 10 MG/ML
INJECTION, EMULSION INTRAVENOUS
Status: DISPENSED
Start: 2019-11-27

## (undated) RX ORDER — HYDROMORPHONE HYDROCHLORIDE 1 MG/ML
INJECTION, SOLUTION INTRAMUSCULAR; INTRAVENOUS; SUBCUTANEOUS
Status: DISPENSED
Start: 2019-10-10

## (undated) RX ORDER — HYDROMORPHONE HYDROCHLORIDE 1 MG/ML
INJECTION, SOLUTION INTRAMUSCULAR; INTRAVENOUS; SUBCUTANEOUS
Status: DISPENSED
Start: 2019-11-27